# Patient Record
Sex: MALE | Race: BLACK OR AFRICAN AMERICAN | NOT HISPANIC OR LATINO | Employment: FULL TIME | URBAN - METROPOLITAN AREA
[De-identification: names, ages, dates, MRNs, and addresses within clinical notes are randomized per-mention and may not be internally consistent; named-entity substitution may affect disease eponyms.]

---

## 2017-08-04 ENCOUNTER — HOSPITAL ENCOUNTER (INPATIENT)
Facility: HOSPITAL | Age: 62
LOS: 1 days | Discharge: HOME | DRG: 064 | End: 2017-08-05
Attending: EMERGENCY MEDICINE | Admitting: PSYCHIATRY & NEUROLOGY
Payer: COMMERCIAL

## 2017-08-04 DIAGNOSIS — I63.9 STROKE: ICD-10-CM

## 2017-08-04 DIAGNOSIS — I63.411 EMBOLIC STROKE INVOLVING RIGHT MIDDLE CEREBRAL ARTERY: ICD-10-CM

## 2017-08-04 DIAGNOSIS — I63.9 ACUTE ISCHEMIC STROKE: Primary | ICD-10-CM

## 2017-08-04 DIAGNOSIS — R47.01 APHASIA: ICD-10-CM

## 2017-08-04 DIAGNOSIS — R47.01 EXPRESSIVE APHASIA: ICD-10-CM

## 2017-08-04 PROBLEM — D18.00 CAVERNOMA: Status: ACTIVE | Noted: 2017-08-04

## 2017-08-04 PROBLEM — R00.0 RAPID HEART BEAT: Status: ACTIVE | Noted: 2017-08-04

## 2017-08-04 LAB
ABO + RH BLD: NORMAL
ALBUMIN SERPL BCP-MCNC: 4 G/DL
ALP SERPL-CCNC: 71 U/L
ALT SERPL W/O P-5'-P-CCNC: 18 U/L
ANION GAP SERPL CALC-SCNC: 10 MMOL/L
AST SERPL-CCNC: 22 U/L
BASOPHILS # BLD AUTO: 0.03 K/UL
BASOPHILS NFR BLD: 0.7 %
BILIRUB SERPL-MCNC: 0.7 MG/DL
BILIRUB UR QL STRIP: NEGATIVE
BLD GP AB SCN CELLS X3 SERPL QL: NORMAL
BUN SERPL-MCNC: 13 MG/DL
CALCIUM SERPL-MCNC: 9.6 MG/DL
CHLORIDE SERPL-SCNC: 105 MMOL/L
CHOLEST/HDLC SERPL: 4 {RATIO}
CLARITY UR REFRACT.AUTO: CLEAR
CO2 SERPL-SCNC: 24 MMOL/L
COLOR UR AUTO: YELLOW
CREAT SERPL-MCNC: 1.3 MG/DL
DIFFERENTIAL METHOD: ABNORMAL
EOSINOPHIL # BLD AUTO: 0.2 K/UL
EOSINOPHIL NFR BLD: 5 %
ERYTHROCYTE [DISTWIDTH] IN BLOOD BY AUTOMATED COUNT: 13.5 %
EST. GFR  (AFRICAN AMERICAN): >60 ML/MIN/1.73 M^2
EST. GFR  (NON AFRICAN AMERICAN): 57.7 ML/MIN/1.73 M^2
GLUCOSE SERPL-MCNC: 96 MG/DL
GLUCOSE UR QL STRIP: NEGATIVE
HCT VFR BLD AUTO: 41 %
HDL/CHOLESTEROL RATIO: 24.8 %
HDLC SERPL-MCNC: 202 MG/DL
HDLC SERPL-MCNC: 50 MG/DL
HGB BLD-MCNC: 14.9 G/DL
HGB UR QL STRIP: NEGATIVE
INR PPP: 0.9
KETONES UR QL STRIP: NEGATIVE
LDLC SERPL CALC-MCNC: 89 MG/DL
LEUKOCYTE ESTERASE UR QL STRIP: NEGATIVE
LYMPHOCYTES # BLD AUTO: 2.1 K/UL
LYMPHOCYTES NFR BLD: 45.4 %
MCH RBC QN AUTO: 28.5 PG
MCHC RBC AUTO-ENTMCNC: 36.3 G/DL
MCV RBC AUTO: 79 FL
MONOCYTES # BLD AUTO: 0.4 K/UL
MONOCYTES NFR BLD: 7.9 %
NEUTROPHILS # BLD AUTO: 1.9 K/UL
NEUTROPHILS NFR BLD: 40.8 %
NITRITE UR QL STRIP: NEGATIVE
NONHDLC SERPL-MCNC: 152 MG/DL
PH UR STRIP: 5 [PH] (ref 5–8)
PLATELET # BLD AUTO: 190 K/UL
PMV BLD AUTO: 11.5 FL
POCT GLUCOSE: 101 MG/DL (ref 70–110)
POTASSIUM SERPL-SCNC: 3.9 MMOL/L
PROT SERPL-MCNC: 7.5 G/DL
PROT UR QL STRIP: NEGATIVE
PROTHROMBIN TIME: 10.1 SEC
RBC # BLD AUTO: 5.22 M/UL
SODIUM SERPL-SCNC: 139 MMOL/L
SP GR UR STRIP: 1.02 (ref 1–1.03)
TRIGL SERPL-MCNC: 315 MG/DL
TSH SERPL DL<=0.005 MIU/L-ACNC: 2.28 UIU/ML
URN SPEC COLLECT METH UR: NORMAL
UROBILINOGEN UR STRIP-ACNC: NEGATIVE EU/DL
WBC # BLD AUTO: 4.58 K/UL

## 2017-08-04 PROCEDURE — 80061 LIPID PANEL: CPT

## 2017-08-04 PROCEDURE — 25500020 PHARM REV CODE 255: Performed by: EMERGENCY MEDICINE

## 2017-08-04 PROCEDURE — 93010 ELECTROCARDIOGRAM REPORT: CPT | Mod: ,,, | Performed by: INTERNAL MEDICINE

## 2017-08-04 PROCEDURE — 86850 RBC ANTIBODY SCREEN: CPT

## 2017-08-04 PROCEDURE — G0378 HOSPITAL OBSERVATION PER HR: HCPCS

## 2017-08-04 PROCEDURE — 80053 COMPREHEN METABOLIC PANEL: CPT

## 2017-08-04 PROCEDURE — 81003 URINALYSIS AUTO W/O SCOPE: CPT

## 2017-08-04 PROCEDURE — 86900 BLOOD TYPING SEROLOGIC ABO: CPT

## 2017-08-04 PROCEDURE — 84443 ASSAY THYROID STIM HORMONE: CPT

## 2017-08-04 PROCEDURE — 99291 CRITICAL CARE FIRST HOUR: CPT | Mod: 25

## 2017-08-04 PROCEDURE — 85025 COMPLETE CBC W/AUTO DIFF WBC: CPT

## 2017-08-04 PROCEDURE — 85610 PROTHROMBIN TIME: CPT

## 2017-08-04 PROCEDURE — 82962 GLUCOSE BLOOD TEST: CPT

## 2017-08-04 PROCEDURE — 25000003 PHARM REV CODE 250: Performed by: STUDENT IN AN ORGANIZED HEALTH CARE EDUCATION/TRAINING PROGRAM

## 2017-08-04 PROCEDURE — 99223 1ST HOSP IP/OBS HIGH 75: CPT | Mod: ,,, | Performed by: PSYCHIATRY & NEUROLOGY

## 2017-08-04 PROCEDURE — 99291 CRITICAL CARE FIRST HOUR: CPT | Mod: ,,, | Performed by: EMERGENCY MEDICINE

## 2017-08-04 PROCEDURE — 93005 ELECTROCARDIOGRAM TRACING: CPT

## 2017-08-04 RX ORDER — ASPIRIN 325 MG
325 TABLET, DELAYED RELEASE (ENTERIC COATED) ORAL DAILY
Status: DISCONTINUED | OUTPATIENT
Start: 2017-08-05 | End: 2017-08-05 | Stop reason: HOSPADM

## 2017-08-04 RX ORDER — ATORVASTATIN CALCIUM 20 MG/1
40 TABLET, FILM COATED ORAL
Status: COMPLETED | OUTPATIENT
Start: 2017-08-04 | End: 2017-08-04

## 2017-08-04 RX ORDER — ATORVASTATIN CALCIUM 20 MG/1
40 TABLET, FILM COATED ORAL DAILY
Status: DISCONTINUED | OUTPATIENT
Start: 2017-08-05 | End: 2017-08-05 | Stop reason: HOSPADM

## 2017-08-04 RX ORDER — SODIUM CHLORIDE 0.9 % (FLUSH) 0.9 %
3 SYRINGE (ML) INJECTION EVERY 8 HOURS
Status: DISCONTINUED | OUTPATIENT
Start: 2017-08-04 | End: 2017-08-05 | Stop reason: HOSPADM

## 2017-08-04 RX ORDER — HEPARIN SODIUM 5000 [USP'U]/ML
5000 INJECTION, SOLUTION INTRAVENOUS; SUBCUTANEOUS EVERY 8 HOURS
Status: DISCONTINUED | OUTPATIENT
Start: 2017-08-04 | End: 2017-08-05 | Stop reason: HOSPADM

## 2017-08-04 RX ORDER — NAPROXEN 500 MG/1
500 TABLET ORAL 2 TIMES DAILY PRN
Status: ON HOLD | COMMUNITY
End: 2017-08-05 | Stop reason: HOSPADM

## 2017-08-04 RX ORDER — ATENOLOL 50 MG/1
50 TABLET ORAL DAILY
Status: DISCONTINUED | OUTPATIENT
Start: 2017-08-05 | End: 2017-08-05 | Stop reason: HOSPADM

## 2017-08-04 RX ORDER — ATENOLOL 50 MG/1
50 TABLET ORAL DAILY
COMMUNITY
End: 2019-08-14 | Stop reason: SDUPTHER

## 2017-08-04 RX ORDER — LABETALOL HYDROCHLORIDE 5 MG/ML
10 INJECTION, SOLUTION INTRAVENOUS
Status: DISCONTINUED | OUTPATIENT
Start: 2017-08-04 | End: 2017-08-05 | Stop reason: HOSPADM

## 2017-08-04 RX ORDER — ASPIRIN 81 MG/1
81 TABLET ORAL DAILY
Status: ON HOLD | COMMUNITY
End: 2017-08-05 | Stop reason: HOSPADM

## 2017-08-04 RX ADMIN — IOHEXOL 100 ML: 350 INJECTION, SOLUTION INTRAVENOUS at 09:08

## 2017-08-04 RX ADMIN — ATORVASTATIN CALCIUM 40 MG: 20 TABLET, FILM COATED ORAL at 08:08

## 2017-08-04 NOTE — ED TRIAGE NOTES
"Patient states that he was "joking with a coworker when he was suddenly jumbling his words." He felt numbness and tingling during the episode, but denies any current numbness/tingling. States that the episode lasted approx. 45 seconds.   "

## 2017-08-05 VITALS
DIASTOLIC BLOOD PRESSURE: 92 MMHG | HEART RATE: 59 BPM | BODY MASS INDEX: 29.59 KG/M2 | SYSTOLIC BLOOD PRESSURE: 141 MMHG | RESPIRATION RATE: 20 BRPM | TEMPERATURE: 98 F | WEIGHT: 238 LBS | OXYGEN SATURATION: 96 % | HEIGHT: 75 IN

## 2017-08-05 DIAGNOSIS — I63.311 STROKE DUE TO THROMBOSIS OF RIGHT MIDDLE CEREBRAL ARTERY: Primary | ICD-10-CM

## 2017-08-05 PROBLEM — G93.6 CYTOTOXIC CEREBRAL EDEMA: Status: ACTIVE | Noted: 2017-08-05

## 2017-08-05 PROBLEM — I77.3 FIBROMUSCULAR DYSPLASIA: Status: ACTIVE | Noted: 2017-08-05

## 2017-08-05 PROBLEM — I63.411 EMBOLIC STROKE INVOLVING RIGHT MIDDLE CEREBRAL ARTERY: Status: ACTIVE | Noted: 2017-08-04

## 2017-08-05 LAB
ALBUMIN SERPL BCP-MCNC: 3.4 G/DL
ALP SERPL-CCNC: 52 U/L
ALT SERPL W/O P-5'-P-CCNC: 15 U/L
ANION GAP SERPL CALC-SCNC: 7 MMOL/L
APTT BLDCRRT: 23.9 SEC
AST SERPL-CCNC: 17 U/L
BASOPHILS # BLD AUTO: 0.03 K/UL
BASOPHILS NFR BLD: 0.7 %
BILIRUB SERPL-MCNC: 0.7 MG/DL
BUN SERPL-MCNC: 12 MG/DL
CALCIUM SERPL-MCNC: 8.9 MG/DL
CHLORIDE SERPL-SCNC: 105 MMOL/L
CK MB SERPL-MCNC: 1.8 NG/ML
CK MB SERPL-RTO: 0.7 %
CK SERPL-CCNC: 268 U/L
CO2 SERPL-SCNC: 25 MMOL/L
CREAT SERPL-MCNC: 1.2 MG/DL
DIASTOLIC DYSFUNCTION: YES
DIFFERENTIAL METHOD: ABNORMAL
EOSINOPHIL # BLD AUTO: 0.2 K/UL
EOSINOPHIL NFR BLD: 5 %
ERYTHROCYTE [DISTWIDTH] IN BLOOD BY AUTOMATED COUNT: 13.3 %
EST. GFR  (AFRICAN AMERICAN): >60 ML/MIN/1.73 M^2
EST. GFR  (NON AFRICAN AMERICAN): >60 ML/MIN/1.73 M^2
ESTIMATED PA SYSTOLIC PRESSURE: 35.72
GLUCOSE SERPL-MCNC: 84 MG/DL
HCT VFR BLD AUTO: 38.2 %
HGB BLD-MCNC: 14 G/DL
INR PPP: 0.9
LYMPHOCYTES # BLD AUTO: 2 K/UL
LYMPHOCYTES NFR BLD: 42.4 %
MAGNESIUM SERPL-MCNC: 1.9 MG/DL
MCH RBC QN AUTO: 28.6 PG
MCHC RBC AUTO-ENTMCNC: 36.6 G/DL
MCV RBC AUTO: 78 FL
MITRAL VALVE MOBILITY: NORMAL
MITRAL VALVE REGURGITATION: ABNORMAL
MONOCYTES # BLD AUTO: 0.4 K/UL
MONOCYTES NFR BLD: 7.8 %
NEUTROPHILS # BLD AUTO: 2 K/UL
NEUTROPHILS NFR BLD: 44.1 %
PHOSPHATE SERPL-MCNC: 3.8 MG/DL
PLATELET # BLD AUTO: 162 K/UL
PMV BLD AUTO: 11.9 FL
POTASSIUM SERPL-SCNC: 3.7 MMOL/L
PROT SERPL-MCNC: 6.2 G/DL
PROTHROMBIN TIME: 10.2 SEC
RBC # BLD AUTO: 4.9 M/UL
RETIRED EF AND QEF - SEE NOTES: 60 (ref 55–65)
SODIUM SERPL-SCNC: 137 MMOL/L
TROPONIN I SERPL DL<=0.01 NG/ML-MCNC: 0.01 NG/ML
WBC # BLD AUTO: 4.6 K/UL

## 2017-08-05 PROCEDURE — 99239 HOSP IP/OBS DSCHRG MGMT >30: CPT | Mod: ,,, | Performed by: PSYCHIATRY & NEUROLOGY

## 2017-08-05 PROCEDURE — 63600175 PHARM REV CODE 636 W HCPCS: Performed by: PHYSICIAN ASSISTANT

## 2017-08-05 PROCEDURE — 85025 COMPLETE CBC W/AUTO DIFF WBC: CPT

## 2017-08-05 PROCEDURE — 92610 EVALUATE SWALLOWING FUNCTION: CPT

## 2017-08-05 PROCEDURE — 97165 OT EVAL LOW COMPLEX 30 MIN: CPT

## 2017-08-05 PROCEDURE — 85610 PROTHROMBIN TIME: CPT

## 2017-08-05 PROCEDURE — 85730 THROMBOPLASTIN TIME PARTIAL: CPT

## 2017-08-05 PROCEDURE — 97530 THERAPEUTIC ACTIVITIES: CPT

## 2017-08-05 PROCEDURE — 80053 COMPREHEN METABOLIC PANEL: CPT

## 2017-08-05 PROCEDURE — 82553 CREATINE MB FRACTION: CPT

## 2017-08-05 PROCEDURE — A4216 STERILE WATER/SALINE, 10 ML: HCPCS | Performed by: PHYSICIAN ASSISTANT

## 2017-08-05 PROCEDURE — 25000003 PHARM REV CODE 250: Performed by: STUDENT IN AN ORGANIZED HEALTH CARE EDUCATION/TRAINING PROGRAM

## 2017-08-05 PROCEDURE — 36415 COLL VENOUS BLD VENIPUNCTURE: CPT

## 2017-08-05 PROCEDURE — G8989 SELF CARE D/C STATUS: HCPCS | Mod: CH

## 2017-08-05 PROCEDURE — G8988 SELF CARE GOAL STATUS: HCPCS | Mod: CH

## 2017-08-05 PROCEDURE — 83735 ASSAY OF MAGNESIUM: CPT

## 2017-08-05 PROCEDURE — 93306 TTE W/DOPPLER COMPLETE: CPT

## 2017-08-05 PROCEDURE — 84100 ASSAY OF PHOSPHORUS: CPT

## 2017-08-05 PROCEDURE — G8996 SWALLOW CURRENT STATUS: HCPCS | Mod: CH

## 2017-08-05 PROCEDURE — 99222 1ST HOSP IP/OBS MODERATE 55: CPT | Mod: ,,, | Performed by: SURGERY

## 2017-08-05 PROCEDURE — 20600001 HC STEP DOWN PRIVATE ROOM

## 2017-08-05 PROCEDURE — 93306 TTE W/DOPPLER COMPLETE: CPT | Mod: 26,,, | Performed by: INTERNAL MEDICINE

## 2017-08-05 PROCEDURE — G8998 SWALLOW D/C STATUS: HCPCS | Mod: CH

## 2017-08-05 PROCEDURE — G8987 SELF CARE CURRENT STATUS: HCPCS | Mod: CH

## 2017-08-05 PROCEDURE — 25000003 PHARM REV CODE 250: Performed by: PHYSICIAN ASSISTANT

## 2017-08-05 PROCEDURE — 84484 ASSAY OF TROPONIN QUANT: CPT

## 2017-08-05 PROCEDURE — G8997 SWALLOW GOAL STATUS: HCPCS | Mod: CH

## 2017-08-05 RX ORDER — CLOPIDOGREL BISULFATE 75 MG/1
75 TABLET ORAL DAILY
Status: DISCONTINUED | OUTPATIENT
Start: 2017-08-05 | End: 2017-08-05 | Stop reason: HOSPADM

## 2017-08-05 RX ORDER — CLOPIDOGREL BISULFATE 75 MG/1
75 TABLET ORAL DAILY
Qty: 30 TABLET | Refills: 1 | Status: SHIPPED | OUTPATIENT
Start: 2017-08-05 | End: 2017-09-26

## 2017-08-05 RX ORDER — ROSUVASTATIN CALCIUM 40 MG/1
40 TABLET, COATED ORAL NIGHTLY
Qty: 30 TABLET | Refills: 1 | Status: SHIPPED | OUTPATIENT
Start: 2017-08-05 | End: 2017-11-29 | Stop reason: SDUPTHER

## 2017-08-05 RX ORDER — ASPIRIN 325 MG
325 TABLET, DELAYED RELEASE (ENTERIC COATED) ORAL DAILY
Qty: 30 TABLET | Refills: 1 | Status: SHIPPED | OUTPATIENT
Start: 2017-08-05 | End: 2017-09-26

## 2017-08-05 RX ADMIN — HEPARIN SODIUM 5000 UNITS: 5000 INJECTION, SOLUTION INTRAVENOUS; SUBCUTANEOUS at 01:08

## 2017-08-05 RX ADMIN — HEPARIN SODIUM 5000 UNITS: 5000 INJECTION, SOLUTION INTRAVENOUS; SUBCUTANEOUS at 06:08

## 2017-08-05 RX ADMIN — CLOPIDOGREL 75 MG: 75 TABLET, FILM COATED ORAL at 01:08

## 2017-08-05 RX ADMIN — ATORVASTATIN CALCIUM 40 MG: 20 TABLET, FILM COATED ORAL at 09:08

## 2017-08-05 RX ADMIN — SODIUM CHLORIDE, PRESERVATIVE FREE 3 ML: 5 INJECTION INTRAVENOUS at 01:08

## 2017-08-05 RX ADMIN — ASPIRIN 325 MG: 325 TABLET, DELAYED RELEASE ORAL at 09:08

## 2017-08-05 RX ADMIN — ATENOLOL 50 MG: 50 TABLET ORAL at 09:08

## 2017-08-05 RX ADMIN — SODIUM CHLORIDE, PRESERVATIVE FREE 3 ML: 5 INJECTION INTRAVENOUS at 06:08

## 2017-08-05 NOTE — PT/OT/SLP PROGRESS
Physical Therapy  PT Screen    Reji Aleman  MRN: [id number]     PT Evaluate and treat orders received and acknowledged. PT alerted by OT that pt present with resolution of symptoms and no therapy needs at this time. PT arrived to pt's room to discuss with pt; pt confirmed OT's report and stated no needs for PT at this time. No charges entered for this services. D/C Acute PT services 8/5/2017.    Lisa Limon, PT, DPT  [phone]  5/15/2017

## 2017-08-05 NOTE — PLAN OF CARE
Problem: Patient Care Overview  Goal: Plan of Care Review  Poc reviewed with pt. Upon d/c today Including s/s of stroke, when to call emergency services, medications ordered and low fat/carb/sodium diet to help prevent a future stroke, and management of hypertension. Pt. Verbalized understanding.  Pt. Without falls, injuries, or skin breakdown this shift.

## 2017-08-05 NOTE — HPI
62 year old left handed male presents to the ED after what he describes as trouble getting his words out x 30 seconds today while at work. The patient also reports bilateral fingertip tingling at the time. The patient reports no prior history of similar, and states that he did not have any facial droop, headache, walking problems, weakness, or coordination problems during this event. The patient takes home daily baby asa and atenolol for rapid heart beat (dx in 1996 - was not told afib). Denies recent palpitations or irregular heart beat.

## 2017-08-05 NOTE — PLAN OF CARE
Problem: SLP Goal  Goal: SLP Goal  Speech Language Pathology Goals  Goals expected to be met by 8/12  1. Pt will participate in speech language cognitive eval to determine need for intervention.       Swallow evaluation completed with initiated POC.    Janina Arias M.A. CCC-SLP  Speech Language Pathologist  [phone]  8/5/2017

## 2017-08-05 NOTE — NURSING TRANSFER
Nursing Transfer Note      8/4/2017     Transfer from ED to Room 742    Transfer via stretcher    Transfer with personal belongings    Transported by patient escort    Medicines sent: no    Chart send with patient: yes    Notified: stroke team     Patient reassessed at: upon arrival    Upon arrival to floor: oriented to room, vital signs taken, assessment per flowsheet

## 2017-08-05 NOTE — ASSESSMENT & PLAN NOTE
Patient is a 63 yo M with h/o sinus tachycardia (on atenolol), HTN presenting with resolved episode of expressive aphasia found to have right frontal stroke.    Will f/u ECHO results; if other cause for R frontal stroke will defer treatment of carotid web; however if R carotid web cause of stroke will consider right carotid endarterectomy  Continue medical management of stroke: ASA, plavix,statin  Will arrange for follow up with Dr. Gamez as an outpatient if discharging today

## 2017-08-05 NOTE — PLAN OF CARE
Problem: Occupational Therapy Goal  Goal: Occupational Therapy Goal  Goals not set 2* no further OT needed.  OT evaluation completed.  ANALISA Taylor  8/5/2017

## 2017-08-05 NOTE — PLAN OF CARE
Problem: Patient Care Overview  Goal: Individualization & Mutuality  Admit dx:  Acute ischemic stroke    PMH:   Rapid heart beat   Cavernoma   Aphasia   Expressive aphasia         Patient dislikes the room cold; prefers his own clothes vs. hospital gown    Outcome: Ongoing (interventions implemented as appropriate)  POC reviewed with patient. Vital signs stable. No falls or injuries during this shift. No deficits observed from stroke. NIH scale 0. Advanced diet since passed CLYDE. Skin remains intact at this time. No s/s of infection noted.

## 2017-08-05 NOTE — HPI
Patient is a 63 yo M with h/o sinus tachycardia (on atenolol), HTN presenting with resolved episode of expressive aphasia.  Patient reports that while at work had 30 sec-1 min episode of inability to get words out that resolved.  MRI done demonstrated small right frontal lobe infarct with associated hemorrhage.  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  Never been a smoker; was on ASA daily prior to this.

## 2017-08-05 NOTE — CONSULTS
[facility]  Vascular Surgery  Consult Note    Consults  Subjective:     Chief Complaint/Reason for Admission: stroke    History of Present Illness: Patient is a 61 yo M with h/o sinus tachycardia (on atenolol), HTN presenting with resolved episode of expressive aphasia.  Patient reports that while at work had 30 sec-1 min episode of inability to get words out that resolved.  MRI done demonstrated small right frontal lobe infarct with associated hemorrhage.  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  Never been a smoker; was on ASA daily prior to this.    Prescriptions Prior to Admission   Medication Sig Dispense Refill Last Dose    aspirin (ECOTRIN) 81 MG EC tablet Take 81 mg by mouth once daily.   8/4/2017    atenolol (TENORMIN) 50 MG tablet Take 50 mg by mouth once daily.   8/4/2017    naproxen (EC NAPROSYN) 500 MG EC tablet Take 500 mg by mouth 2 (two) times daily as needed.   More than a month at Unknown time       Review of patient's allergies indicates:   Allergen Reactions    Sulfa (sulfonamide antibiotics)        Past Medical History:   Diagnosis Date    Hypertension      History reviewed. No pertinent surgical history.  Family History     None        Social History Main Topics    Smoking status: Never Smoker    Smokeless tobacco: Never Used    Alcohol use No    Drug use: No    Sexual activity: Not on file     Review of Systems   Constitutional: Negative for activity change, chills and fatigue.   HENT: Negative for congestion and dental problem.    Eyes: Negative for photophobia, redness and itching.   Respiratory: Negative for apnea, choking and chest tightness.    Cardiovascular: Negative for chest pain and leg swelling.   Gastrointestinal: Negative for abdominal distention and abdominal pain.   Endocrine: Negative for cold intolerance and heat intolerance.   Genitourinary: Negative for difficulty  urinating and dysuria.   Musculoskeletal: Negative for arthralgias and back pain.   Skin: Negative for color change and pallor.   Allergic/Immunologic: Negative for environmental allergies and food allergies.   Neurological: Negative for dizziness and facial asymmetry.   Hematological: Negative for adenopathy.   Psychiatric/Behavioral: Negative for agitation, behavioral problems and confusion.     Objective:     Vital Signs (Most Recent):  Temp: 97.7 °F (36.5 °C) (08/05/17 1200)  Pulse: 64 (08/05/17 1200)  Resp: 20 (08/05/17 1200)  BP: (!) 141/92 (08/05/17 1200)  SpO2: 96 % (08/05/17 1200) Vital Signs (24h Range):  Temp:  [97.5 °F (36.4 °C)-98 °F (36.7 °C)] 97.7 °F (36.5 °C)  Pulse:  [58-84] 64  Resp:  [15-20] 20  SpO2:  [92 %-100 %] 96 %  BP: (138-173)/() 141/92     Weight: 108 kg (238 lb)  Body mass index is 29.75 kg/m².    Physical Exam   Constitutional: He is oriented to person, place, and time. He appears well-developed and well-nourished.   HENT:   Head: Normocephalic and atraumatic.   Eyes: EOM are normal. Pupils are equal, round, and reactive to light.   Cardiovascular: Normal rate and regular rhythm.    Pulses:       Radial pulses are 2+ on the right side, and 2+ on the left side.        Posterior tibial pulses are 1+ on the right side, and 1+ on the left side.   Pulmonary/Chest: Effort normal. No respiratory distress.   Abdominal: Soft. He exhibits no distension. There is no tenderness.   Musculoskeletal: Normal range of motion. He exhibits no edema.   Neurological: He is oriented to person, place, and time.   5/5 strength bilateral upper and lower extremities   Skin: Skin is warm and dry.   Psychiatric: He has a normal mood and affect.       Significant Labs:  CBC:   Recent Labs  Lab 08/05/17  0442   WBC 4.60   RBC 4.90   HGB 14.0   HCT 38.2*      MCV 78*   MCH 28.6   MCHC 36.6*     CMP:   Recent Labs  Lab 08/05/17  0441   GLU 84   CALCIUM 8.9   ALBUMIN 3.4*   PROT 6.2      K 3.7   CO2  25      BUN 12   CREATININE 1.2   ALKPHOS 52*   ALT 15   AST 17   BILITOT 0.7     Coagulation:   Recent Labs  Lab 08/05/17  0441   LABPROT 10.2   INR 0.9   APTT 23.9       Significant Diagnostics:  MRI brain without contrast    08/04/17 19:58:21    Accession# [id number]    CLINICAL INDICATION: 62 year old M with aphasia.      TECHNIQUE: Multiplanar multisequence MR imaging of the brain was performed without the use of intravenous contrast.    COMPARISON: CT head 08/04/2017.    FINDINGS:    The ventricles are normal in size for age, without evidence of hydrocephalus.    Small focus of cortical diffusion restriction in the right frontal lobe with associated gradient susceptibility.  There is a 1.1 cm lesion with heterogeneous signal in the left middle cerebellar peduncle suggestive of a cavernous malformation.    No extra-axial blood or fluid collections.    The T2 skull base flow voids are preserved. Bone marrow signal intensity is unremarkable.  Mild mucosal thickening of bilateral maxillary antra.   Impression         1.  Small focus of acute cortical infarct in the right frontal lobe with associated microhemorrhage.    2.  1.1 cm cavernous malformation in the left middle cerebral peduncle.  ______________________________________         Assessment/Plan:     Acute ischemic stroke    Patient is a 61 yo M with h/o sinus tachycardia (on atenolol), HTN presenting with resolved episode of expressive aphasia found to have right frontal stroke.    Will f/u ECHO results; if other cause for R frontal stroke will defer treatment of carotid web; however if R carotid web cause of stroke will consider right carotid endarterectomy  Continue medical management of stroke: ASA, plavix,statin  Will arrange for follow up with Dr. Gamez as an outpatient if discharging today            Thank you for your consult.    Natalie Lofton MD  Vascular Surgery  [facility]

## 2017-08-05 NOTE — H&P
[facility]  Vascular Neurology  Comprehensive Stroke Center  History & Physical    Consults  Assessment/Plan:     Patient is a 62 y.o. year old male with:    Acute ischemic stroke    Right frontal area with left cerebellar peduncle cavernoma.     Symptoms correlate as patient is a left handed individual with right hemispheric ischemic stroke and episode of expressive aphasia     Increase ASA to 325 daily  Atorvastatin 40 daily  - LDL > 70   CTA head and neck   Echo   Hep vte  Bp< 220/110  - permissive HTN  PT/OT/ speech - likely no needs as he is back to baseline   Stroke education        Cavernoma    Can follow outpatient with neurosurg - asymptomatic         Rapid heart beat    No known dx of afib  Controlled- takes atenolol              Thrombolysis Candidate? No  1. Contraindications: symptom resolution       Interventional Revascularization Candidate?  No; No large vessel occlusion    Research Candidate? No     Subjective:     History of Present Illness:  62 year old left handed male presents to the ED after what he describes as trouble getting his words out x 30 seconds today while at work. The patient also reports bilateral fingertip tingling at the time. The patient reports no prior history of similar, and states that he did not have any facial droop, headache, walking problems, weakness, or coordination problems during this event. The patient takes home daily baby asa and atenolol for rapid heart beat (dx in 1996 - was not told afib). Denies recent palpitations or irregular heart beat.            Past Medical History:   Diagnosis Date    Hypertension      History reviewed. No pertinent surgical history.  History reviewed. No pertinent family history.  Social History   Substance Use Topics    Smoking status: Never Smoker    Smokeless tobacco: Never Used    Alcohol use No     Review of patient's allergies indicates:   Allergen Reactions    Sulfa (sulfonamide antibiotics)       Medications: I have reviewed the current medication administration record.      (Not in a hospital admission)    Review of Systems   Constitutional: Negative for chills and fever.   HENT: Negative for drooling and facial swelling.    Eyes: Negative for visual disturbance.   Respiratory: Negative for shortness of breath.    Cardiovascular: Negative for palpitations.   Gastrointestinal: Negative for vomiting.   Skin: Negative for color change.   Allergic/Immunologic: Negative for immunocompromised state.   Neurological: Positive for speech difficulty (resolved) and numbness (bilateral fingertips, resolved). Negative for dizziness, syncope, facial asymmetry, weakness and headaches.   Psychiatric/Behavioral: Negative for agitation.     Objective:     Vital Signs (Most Recent):  Temp: 98 °F (36.7 °C) (08/04/17 1812)  Pulse: 62 (08/04/17 2021)  Resp: 19 (08/04/17 1952)  BP: (!) 153/93 (08/04/17 2021)  SpO2: 100 % (08/04/17 2021)    Vital Signs Range (Last 24H):  Temp:  [98 °F (36.7 °C)]   Pulse:  [62-82]   Resp:  [15-19]   BP: (138-173)/(88-97)   SpO2:  [98 %-100 %]     Physical Exam   Constitutional: He is oriented to person, place, and time. He appears well-developed and well-nourished.   HENT:   Head: Normocephalic and atraumatic.   Eyes: EOM are normal. Pupils are equal, round, and reactive to light.   Cardiovascular: Normal rate.    Pulmonary/Chest: Effort normal.   Abdominal: Soft.   Musculoskeletal: Normal range of motion. He exhibits no edema.   Neurological: He is alert and oriented to person, place, and time.   Skin: Skin is warm and dry.   Psychiatric: He has a normal mood and affect.   Nursing note and vitals reviewed.      Neurological Exam:   LOC: alert and follows requests  Language: No aphasia  Speech: No dysarthria  Orientation: Person, Place, Time  Memory: Recent memory intact, Remote memory intact, Age correct, Month correct  Visual Fields (CN II): Full  EOM (CN III, IV, VI):  Full/intact  Oculocephalics: normal  Pupils (CN III, IV, VI): PERRL  Facial Sensation (CN V): Symmetric  Facial Movement (CN VII): symmetric facial expression  Hearing (CN VIII): intact bilaterally  Gag Reflex (CN IX, X): normal/symmetric  Shoulder/Neck (CN XI): SCM-Left: Normal ; SCM-Right: Normal ; Shoulder Shrug: Normal/Symetric  Tongue (CN XII): to midline  Motor*: Arm Left:  Normal (5/5), Leg Left:   Normal (5/5), Arm Right:   Normal (5/5), Leg Right:   Normal (5/5)  Cerebellar*: Normal limb  Sensation: intact to light touch, temperature and vibration  Tone: Arm-Left: normal; Leg-Left: normal; Arm-Right: normal; Leg-Right: normal    Stroke Scales  NIH Stroke Scale:    Level of Consciousness: 0 - alert  LOC Questions: 0 - answers both correctly  LOC Commands: 0 - performs both correctly  Best Gaze: 0 - normal  Visual: 0 - no visual loss  Facial Palsy: 0 - normal  Motor Left Arm: 0 - no drift  Motor Right Arm: 0 - no drift  Motor Left Le - no drift  Motor Right Le - no drift  Limb Ataxia: 0 - absent  Sensory: 0 - normal  Best Language: 0 - no aphasia  Dysarthria: 0 - normal articulation  Extinction and Inattention: 0 - no neglect  NIH Stroke Scale Total: 0  Modified Ralls Scale:   Timeline: Prior to symptoms onset  Modified Ralls Score: 0 - no symptoms    ABCD2 Scale for TIA:   Age > or = 60: 1 - yes  B/P or = 140/9 at Initial Evaluation: 1 - yes  Clinical Features of TIA: 1 - speech disturbance without weakness  Duration of Symptoms: 0 - < 10 minutes  Diabetes Mellitus in History: 0 - no  ABCD2 Scale Total: 3        Laboratory:  CMP:   Recent Labs  Lab 17  1825   CALCIUM 9.6   ALBUMIN 4.0   PROT 7.5      K 3.9   CO2 24      BUN 13   CREATININE 1.3   ALKPHOS 71   ALT 18   AST 22   BILITOT 0.7     CBC:   Recent Labs  Lab 17  1825   WBC 4.58   RBC 5.22   HGB 14.9   HCT 41.0      MCV 79*   MCH 28.5   MCHC 36.3*     Lipid Panel:   Recent Labs  Lab 17  1825   CHOL 202*    LDLCALC 89.0   HDL 50   TRIG 315*     Coagulation:   Recent Labs  Lab 08/04/17  1825   INR 0.9     Platelet Aggregation Study: No results for input(s): PLTAGG, PLTAGINTERP, PLTAGREGLACO, ADPPLTAGGREG in the last 168 hours.  Hgb A1C: No results for input(s): HGBA1C in the last 168 hours.  TSH:   Recent Labs  Lab 08/04/17  1825   TSH 2.278       Diagnostic Results:  Brain Imaging:   MRI 8/4/17  IMPRESSION:    1.  Small focus of acute cortical infarct in the right frontal lobe with associated hemorrhage.     2.  1.1 cm cavernous malformation in the left middle cerebral peduncle.    CT head 8/4/17  1.0 cm hyperdense focus within the left middle cerebellar peduncle without surrounding edema.  This finding is nonspecific and may represent hemorrhage versus cavernoma, however hemorrhage is thought less likely.  Further evaluation may be obtained with MRI.    No CT evidence of acute cerebrovascular infarction.  MRI followup as needed.        MAHENDRA Henderson  [facility]  Department of Vascular Neurology   [facility]

## 2017-08-05 NOTE — PT/OT/SLP EVAL
Speech Language Pathology  Evaluation    Reji Aleman   MRN: [id number]   Admitting Diagnosis: stroke    Diet recommendations: Solid Diet Level: Regular  Liquid Diet Level: Thin   Standard precautions    SLP Treatment Date: 08/05/17  Speech Start Time: 1007     Speech Stop Time: 1015     Speech Total (min): 8 min       TREATMENT BILLABLE MINUTES:  Eval Swallow and Oral Function 8    Diagnosis: stroke    Past Medical History:   Diagnosis Date    Hypertension      History reviewed. No pertinent surgical history.    Has the patient been evaluated by SLP for swallowing? : Yes  Keep patient NPO?: No   General Precautions: Standard,            Prior diet: No reported restrictions; no SLP notes in Epic.    Subjective:  Pt awake; pleasant  Patient goals: not stated.    Pain/Comfort  Pain Rating 1: 0/10  Pain Rating Post-Intervention 1: 0/10    Objective:        Oral Musculature Evaluation  Oral Musculature: WFL  Dentition: present and adequate  Mucosal Quality: good  Mandibular Strength and Mobility: WNL  Oral Labial Strength and Mobility: WNL  Lingual Strength and Mobility: WNL  Buccal Strength and Mobility: WNL  Volitional Swallow: able to demonstrate  Voice Prior to PO Intake: clear     Bedside Swallow Eval:  Consistencies Assessed: Thin liquids 2 oz via cup and straw, Puree 5 1/2-full spoonfuls and Solids 1/4 javi cracker  Oral Phase: WNL  Pharyngeal Phase: no overt clinical  signs/symptoms of aspiration and no overt clinical signs/symptoms of pharyngeal dysphagia    Pt denied difficulty with breakfast; denies cognitive-linguistic changes sp admit. Skilled education provided on aspiration precautions and diet recommendations. Whiteboard updated with diet recommendations/aspiration precautions.  No further questions.                                       Assessment:  Reji Aleman is a 62 y.o. male with a medical diagnosis of stroke and presents with oropharyngeal swallow WNL.     Do you have any cultural, spiritual,  Catholic conflicts, given your current situation?: no     Discharge recommendations: Discharge Facility/Level Of Care Needs:  (likely no needs)     Goals:    SLP Goals        Problem: SLP Goal    Goal Priority Disciplines Outcome   SLP Goal     SLP    Description:  Speech Language Pathology Goals  Goals expected to be met by 8/12  1. Pt will participate in speech language cognitive eval to determine need for intervention.                        Plan:   Patient to be seen Therapy Frequency: 5 x/week   Plan of Care expires: 09/03/17  Plan of Care reviewed with: patient  SLP Follow-up?: Yes  SLP - Next Visit Date: 08/07/17           Janina Arias M.A. CCC-SLP  Speech Language Pathologist  [phone]  8/5/2017

## 2017-08-05 NOTE — NURSING
Pt. D/c to home with vss, no acute distress noted, IV line d/c with catheter intact, all d/c instructions with f/up appt.'s and medications explained and given to pt. Pt. Verbalized understanding.

## 2017-08-05 NOTE — CONSULTS
Reason for Consult: Stroke Pathway.    Reviewed Stroke nutrition therapy with pt and wife.  Discussed low Na foods, high fiber items and preferred ways to prepare recipes.  Encouraged to follow low Na diet 1500-2000mg sodium per day.  Recommended foods list provided and example menu.  All questions answered, no further needs at this time.

## 2017-08-05 NOTE — ED PROVIDER NOTES
"Encounter Date: 8/4/2017       History     Chief Complaint   Patient presents with    Aphasia     states at approx 430 pm today, episode of "unable to answer questions, speech slurred, couldn't find words" states incident lasted approx 1 min, all symptoms now resolved     Mr. Aleman is a 62M with sinus tachycardia who presents today after an expressive aphasia episode today at work. Pt reports that he was telling his coworkers a story at work, when the words just wouldn't come out. He struggled for 30 sec or so, and "people thought (he) was joking around, (he) could understand them, knew what (he) wanted to say, but couldn't get the words out. (He) then turned to the  and asked if (he) looked like (he) was okay, and the words came out perfectly."  Pt reported paresthesia in fingertips of both hands.     Pt never lost consciousness. Denies HA, Dizziness, Chest Pain, Syncope, LOF of limbs, or Discoordination.           Review of patient's allergies indicates:   Allergen Reactions    Sulfa (sulfonamide antibiotics)      Past Medical History:   Diagnosis Date    Embolic stroke involving right middle cerebral artery 8/4/2017    Hypertension      History reviewed. No pertinent surgical history.  History reviewed. No pertinent family history.  Social History   Substance Use Topics    Smoking status: Never Smoker    Smokeless tobacco: Never Used    Alcohol use No     Review of Systems   Constitutional: Negative for activity change and fever.   Eyes: Negative for visual disturbance.   Respiratory: Negative for choking, chest tightness and shortness of breath.    Cardiovascular: Negative for chest pain, palpitations and leg swelling.   Neurological: Positive for speech difficulty. Negative for dizziness, seizures, syncope, facial asymmetry and headaches.   Psychiatric/Behavioral: Negative for confusion.   All other systems reviewed and are negative.      Physical Exam     Initial Vitals [08/04/17 1812]   BP " Pulse Resp Temp SpO2   (!) 138/91 82 15 98 °F (36.7 °C) 98 %      MAP       106.67         Physical Exam    Nursing note and vitals reviewed.  Constitutional: He appears well-developed and well-nourished. He is not diaphoretic. No distress.   HENT:   Head: Normocephalic and atraumatic.   Eyes: Conjunctivae are normal.   Neck: Neck supple.   Cardiovascular: Normal rate and regular rhythm.   No murmur heard.  Pulmonary/Chest: Breath sounds normal. No stridor. No respiratory distress. He has no wheezes.   Abdominal: He exhibits no distension.   Musculoskeletal: He exhibits no edema or tenderness.   Neurological: He is alert and oriented to person, place, and time. He has normal strength. No cranial nerve deficit or sensory deficit.   Skin: Skin is warm and dry.   Psychiatric: He has a normal mood and affect.         ED Course   Critical Care  Date/Time: 8/4/2017 8:36 PM  Performed by: LEENA WHITE  Authorized by: LEENA WHITE   Total critical care time (exclusive of procedural time) : 35 minutes  Critical care time was exclusive of teaching time and separately billable procedures and treating other patients.  Critical care was necessary to treat or prevent imminent or life-threatening deterioration of the following conditions: CNS failure or compromise.  Critical care was time spent personally by me on the following activities: development of treatment plan with patient or surrogate, discussions with consultants, evaluation of patient's response to treatment, examination of patient, obtaining history from patient or surrogate, ordering and performing treatments and interventions, ordering and review of laboratory studies, ordering and review of radiographic studies, pulse oximetry, re-evaluation of patient's condition and review of old charts.        Labs Reviewed   CBC W/ AUTO DIFFERENTIAL - Abnormal; Notable for the following:        Result Value    MCV 79 (*)     MCHC 36.3 (*)     All other components  within normal limits   COMPREHENSIVE METABOLIC PANEL - Abnormal; Notable for the following:     eGFR if non  57.7 (*)     All other components within normal limits   LIPID PANEL - Abnormal; Notable for the following:     Cholesterol 202 (*)     Triglycerides 315 (*)     All other components within normal limits   PROTIME-INR   TSH   HEMOGLOBIN A1C   TYPE & SCREEN   POCT GLUCOSE             Medical Decision Making:   Initial Assessment:   Expressive Aphasia (2/2 TIA)  Differential Diagnosis:   Stroke  CVA  Trauma  Conversion disorder  ED Management:  Stroke team consulted  Patient has fully revcovered at this point, episode only lasted about 30 seconds,per patient.               Attending Attestation:   Physician Attestation Statement for Resident:  As the supervising MD   Physician Attestation Statement: I have personally seen and examined this patient.   I agree with the above history. -: 63 yo m, h/o HTN, here s/p brief episode aphasia at working, lasting 30 seconds, resolved spontaneously, asx on arrival to ED, no focal weakness.  Neuro exam normal.  CT showed possible cerebellar peduncle cavernous malformation vs hemorrhage so MRI brain done showing small acute R frontal lobe infarct w small area of hemorrhage.  Vascular neuro aware.  Will admit pt    As the supervising MD I agree with the above PE.    As the supervising MD I agree with the above treatment, course, plan, and disposition.  I have reviewed and agree with the residents interpretation of the following: lab data, CT scans, EKG and x-rays.  I have reviewed the following: old records at this facility.                    ED Course     Clinical Impression:   The primary encounter diagnosis was Acute ischemic stroke. Diagnoses of Aphasia, Stroke, Expressive aphasia, and Embolic stroke involving right middle cerebral artery were also pertinent to this visit.                           Ale Farr MD  08/05/17 7350

## 2017-08-05 NOTE — NURSING TRANSFER
Nursing Transfer Note      8/5/2017     Transfer to ultrasound    Transfer via  Stretcher    Transfer with pt. transport

## 2017-08-05 NOTE — SUBJECTIVE & OBJECTIVE
Prescriptions Prior to Admission   Medication Sig Dispense Refill Last Dose    aspirin (ECOTRIN) 81 MG EC tablet Take 81 mg by mouth once daily.   8/4/2017    atenolol (TENORMIN) 50 MG tablet Take 50 mg by mouth once daily.   8/4/2017    naproxen (EC NAPROSYN) 500 MG EC tablet Take 500 mg by mouth 2 (two) times daily as needed.   More than a month at Unknown time       Review of patient's allergies indicates:   Allergen Reactions    Sulfa (sulfonamide antibiotics)        Past Medical History:   Diagnosis Date    Hypertension      History reviewed. No pertinent surgical history.  Family History     None        Social History Main Topics    Smoking status: Never Smoker    Smokeless tobacco: Never Used    Alcohol use No    Drug use: No    Sexual activity: Not on file     Review of Systems   Constitutional: Negative for activity change, chills and fatigue.   HENT: Negative for congestion and dental problem.    Eyes: Negative for photophobia, redness and itching.   Respiratory: Negative for apnea, choking and chest tightness.    Cardiovascular: Negative for chest pain and leg swelling.   Gastrointestinal: Negative for abdominal distention and abdominal pain.   Endocrine: Negative for cold intolerance and heat intolerance.   Genitourinary: Negative for difficulty urinating and dysuria.   Musculoskeletal: Negative for arthralgias and back pain.   Skin: Negative for color change and pallor.   Allergic/Immunologic: Negative for environmental allergies and food allergies.   Neurological: Negative for dizziness and facial asymmetry.   Hematological: Negative for adenopathy.   Psychiatric/Behavioral: Negative for agitation, behavioral problems and confusion.     Objective:     Vital Signs (Most Recent):  Temp: 97.7 °F (36.5 °C) (08/05/17 1200)  Pulse: 64 (08/05/17 1200)  Resp: 20 (08/05/17 1200)  BP: (!) 141/92 (08/05/17 1200)  SpO2: 96 % (08/05/17 1200) Vital Signs (24h Range):  Temp:  [97.5 °F (36.4 °C)-98 °F (36.7  °C)] 97.7 °F (36.5 °C)  Pulse:  [58-84] 64  Resp:  [15-20] 20  SpO2:  [92 %-100 %] 96 %  BP: (138-173)/() 141/92     Weight: 108 kg (238 lb)  Body mass index is 29.75 kg/m².    Physical Exam   Constitutional: He is oriented to person, place, and time. He appears well-developed and well-nourished.   HENT:   Head: Normocephalic and atraumatic.   Eyes: EOM are normal. Pupils are equal, round, and reactive to light.   Cardiovascular: Normal rate and regular rhythm.    Pulses:       Radial pulses are 2+ on the right side, and 2+ on the left side.        Posterior tibial pulses are 1+ on the right side, and 1+ on the left side.   Pulmonary/Chest: Effort normal. No respiratory distress.   Abdominal: Soft. He exhibits no distension. There is no tenderness.   Musculoskeletal: Normal range of motion. He exhibits no edema.   Neurological: He is oriented to person, place, and time.   5/5 strength bilateral upper and lower extremities   Skin: Skin is warm and dry.   Psychiatric: He has a normal mood and affect.       Significant Labs:  CBC:   Recent Labs  Lab 08/05/17 0442   WBC 4.60   RBC 4.90   HGB 14.0   HCT 38.2*      MCV 78*   MCH 28.6   MCHC 36.6*     CMP:   Recent Labs  Lab 08/05/17 0441   GLU 84   CALCIUM 8.9   ALBUMIN 3.4*   PROT 6.2      K 3.7   CO2 25      BUN 12   CREATININE 1.2   ALKPHOS 52*   ALT 15   AST 17   BILITOT 0.7     Coagulation:   Recent Labs  Lab 08/05/17 0441   LABPROT 10.2   INR 0.9   APTT 23.9       Significant Diagnostics:  MRI brain without contrast    08/04/17 19:58:21    Accession# [id number]    CLINICAL INDICATION: 62 year old M with aphasia.      TECHNIQUE: Multiplanar multisequence MR imaging of the brain was performed without the use of intravenous contrast.    COMPARISON: CT head 08/04/2017.    FINDINGS:    The ventricles are normal in size for age, without evidence of hydrocephalus.    Small focus of cortical diffusion restriction in the right frontal lobe with  associated gradient susceptibility.  There is a 1.1 cm lesion with heterogeneous signal in the left middle cerebellar peduncle suggestive of a cavernous malformation.    No extra-axial blood or fluid collections.    The T2 skull base flow voids are preserved. Bone marrow signal intensity is unremarkable.  Mild mucosal thickening of bilateral maxillary antra.   Impression         1.  Small focus of acute cortical infarct in the right frontal lobe with associated microhemorrhage.    2.  1.1 cm cavernous malformation in the left middle cerebral peduncle.  ______________________________________

## 2017-08-06 NOTE — SUBJECTIVE & OBJECTIVE
NIH Stroke Scale:  Interval: 7 days or at discharge (whichever comes first)  Level of Consciousness: 0 - alert  LOC Questions: 0 - answers both correctly  LOC Commands: 0 - performs both correctly  Best Gaze: 0 - normal  Visual: 0 - no visual loss  Facial Palsy: 0 - normal  Motor Left Arm: 0 - no drift  Motor Right Arm: 0 - no drift  Motor Left Le - no drift  Motor Right Le - no drift  Limb Ataxia: 0 - absent  Sensory: 0 - normal  Best Language: 0 - no aphasia  Dysarthria: 0 - normal articulation  Extinction and Inattention: 0 - no neglect  NIH Stroke Scale Total: 0  Modified [place] Scale:   Timeline: At discharge  Modified Bath Score: 0 - no symptoms      MRI 17  IMPRESSION:    1.  Small focus of acute cortical infarct in the right frontal lobe with associated hemorrhage.     2.  1.1 cm cavernous malformation in the left middle cerebral peduncle.     CT head 17  1.0 cm hyperdense focus within the left middle cerebellar peduncle without surrounding edema.  This finding is nonspecific and may represent hemorrhage versus cavernoma, however hemorrhage is thought less likely.  Further evaluation may be obtained with MRI.    No CT evidence of acute cerebrovascular infarction.  MRI followup as needed.      US carotid 17   No evidence of hemodynamically significant stenosis.    CTA head and neck - 17  1.  No acute intracranial hemorrhage or major vascular distribution infarct.    2.  CTA of the head and neck are within normal limits without evidence of significant stenosis, occlusion or aneurysm. There is concern for R sided carotid web     Echo 17  left atrial volume index is mildly enlarged  CONCLUSIONS     1 - Mild left atrial enlargement.     2 - Normal left ventricular systolic function (EF 60-65%).     3 - Impaired LV relaxation, elevated LAP (grade 2 diastolic dysfunction).     4 - Normal right ventricular systolic function .     5 - Mild mitral regurgitation.     6 - The estimated PA  systolic pressure is 36 mmHg.

## 2017-08-06 NOTE — ASSESSMENT & PLAN NOTE
No known dx of afib  Controlled- takes atenolol  Also with enlarged LA on echo - will order 30 day event monitor

## 2017-08-06 NOTE — DISCHARGE SUMMARY
[facility]  Vascular Neurology  Comprehensive Stroke Center  Discharge Summary     Summary:     Admit Date: 8/4/2017  6:15 PM    Discharge Date and Time: 8/5/2017  5:43 PM    Attending Physician- Dr Chester Palomino     Discharge Provider: MAHENDRA Henderson    History of Present Illness: 62 year old left handed male presents to the ED after what he describes as trouble getting his words out x 30 seconds today while at work. The patient also reports bilateral fingertip tingling at the time. The patient reports no prior history of similar, and states that he did not have any facial droop, headache, walking problems, weakness, or coordination problems during this event. The patient takes home daily baby asa and atenolol for rapid heart beat (dx in 1996 - was not told afib). Denies recent palpitations or irregular heart beat.       Hospital Course (synopsis of major diagnoses, care, treatment, and services provided during the course of the hospital stay): 62 year old male with episode of expressive aphasia today which has currently resolved. Noted to have right sided frontal infarct on MRI which is responsible for language issues in this left handed gentleman.    CTA with concern for carotid web, per dr nolan attestation on H&P:  Suspect embolic source from atypical fibromuscular dysplasia (carotid web) which is identified on the contralateral ICA and I suspect is also present with associated thrombus on the ipsilateral side.    Patient seen and examined by Dr. Gamez and Vascular surgery fellow - with plans to use DAPT and high dose statin until follow up on Friday with tentative plans for surgery the following week    Also with enlarged La on echo - will order 30 day event monitor for the patient.     Given strict instructions regarding ED warnings and stroke signs. Patient lives alone but sister lives a couple of houses down, they talk multiple times throughout the day, developed plan with  patient for regular intervals of calls and for patients sister to check on him If he misses a call. Re-reviewed stroke signs and symptoms. Should return immediately with any new symptoms, patient expressed understanding to myself and Dr. Palomino   Patient comfortable with discharge plan, currently asymptomatic  No therapy needs.     NIH Stroke Scale:  Interval: 7 days or at discharge (whichever comes first)  Level of Consciousness: 0 - alert  LOC Questions: 0 - answers both correctly  LOC Commands: 0 - performs both correctly  Best Gaze: 0 - normal  Visual: 0 - no visual loss  Facial Palsy: 0 - normal  Motor Left Arm: 0 - no drift  Motor Right Arm: 0 - no drift  Motor Left Le - no drift  Motor Right Le - no drift  Limb Ataxia: 0 - absent  Sensory: 0 - normal  Best Language: 0 - no aphasia  Dysarthria: 0 - normal articulation  Extinction and Inattention: 0 - no neglect  NIH Stroke Scale Total: 0  Modified [place] Scale:   Timeline: At discharge  Modified [place] Score: 0 - no symptoms      MRI 17  IMPRESSION:    1.  Small focus of acute cortical infarct in the right frontal lobe with associated hemorrhage.     2.  1.1 cm cavernous malformation in the left middle cerebral peduncle.     CT head 17  1.0 cm hyperdense focus within the left middle cerebellar peduncle without surrounding edema.  This finding is nonspecific and may represent hemorrhage versus cavernoma, however hemorrhage is thought less likely.  Further evaluation may be obtained with MRI.    No CT evidence of acute cerebrovascular infarction.  MRI followup as needed.      US carotid 17   No evidence of hemodynamically significant stenosis.    CTA head and neck - 17  1.  No acute intracranial hemorrhage or major vascular distribution infarct.    2.  CTA of the head and neck are within normal limits without evidence of significant stenosis, occlusion or aneurysm. There is concern for R sided carotid web     Echo 17  left atrial  volume index is mildly enlarged  CONCLUSIONS     1 - Mild left atrial enlargement.     2 - Normal left ventricular systolic function (EF 60-65%).     3 - Impaired LV relaxation, elevated LAP (grade 2 diastolic dysfunction).     4 - Normal right ventricular systolic function .     5 - Mild mitral regurgitation.     6 - The estimated PA systolic pressure is 36 mmHg.       Assessment/Plan:     Interventions: None    Complications: None    Research Candidate?:  No    Neurological deficit at discharge: None     Disposition: Home or Self Care    Final Active Diagnoses:    Diagnosis Date Noted POA    PRINCIPAL PROBLEM:  Embolic stroke involving right middle cerebral artery [I63.411] 08/04/2017 Yes    Fibromuscular dysplasia [I77.3] 08/05/2017 Yes    Cytotoxic cerebral edema [G93.6] 08/05/2017 Yes    Rapid heart beat [R00.0] 08/04/2017 Yes    Cavernoma [D18.01] 08/04/2017 Not Applicable    Aphasia [R47.01] 08/04/2017 Yes    Expressive aphasia [R47.01] 08/04/2017 Yes      Problems Resolved During this Admission:    Diagnosis Date Noted Date Resolved POA     * Embolic stroke involving right middle cerebral artery    Right frontal area with left cerebellar peduncle cavernoma.     Symptoms correlate as patient is a left handed individual with right hemispheric ischemic stroke and episode of expressive aphasia     Increase ASA to 325 daily, add plavix   crestor 40 daily  - LDL > 70   Work up completed - follow up with vascular surgery for web intervention         Cytotoxic cerebral edema    Due to stroke, evident on imaging         Fibromuscular dysplasia    Carotid web noted, evaluated by vascular surgery         Aphasia    Resolved - due to stroke         Cavernoma    Can follow outpatient with neurosurg - asymptomatic         Rapid heart beat    No known dx of afib  Controlled- takes atenolol  Also with enlarged LA on echo - will order 30 day event monitor               Recommendations:     Post-discharge complication  risks: None    Stroke Education given to: patient    Follow-up in Stroke Clinic in 4-6 weeks  Vascular surgery - dr blanton Friday   PCP in one week     Discharge Plan:  Antithrombotics: Aspirin 325mg, Clopidogrel 75mg  Statin: Rosuvastatin 40mg  Aggresive risk factor modification:  Hypertension, High Cholesterol, Diet, Exercise, Obesity and Carotid Artery disease  Potential carotid intervention in upcoming week     Follow Up:  Follow-up Information     [facility]. Schedule an appointment as soon as possible for a visit in 1 week.    Contact information:  [address]  [facility] [address]  [phone]             Joseph Blanton MD In 5 days.    Specialty:  Vascular Surgery  Contact information:  [address]  [facility] [id number] [phone]             [facility] VASCULAR NEUROLOGY. Schedule an appointment as soon as possible for a visit in 4 weeks.    Specialty:  Vascular Neurology  Contact information:  [address]  [facility] [address]  [phone]               Patient Instructions:     Ambulatory Referral to Vascular Surgery   Referral Priority: Routine Referral Type: Consultation   Referral Reason: Specialty Services Required    Referred to Provider: JOSEPH BLANTON Requested Specialty: Vascular Surgery   Number of Visits Requested: 1      Diet Cardiac   Order Comments: See Stroke Patient Education Guide Booklet for details.     Call 911 for any of the following:   Order Comments: Call 911  right away if any of the following warning signs come on suddenly, even if the symptoms only last for a few minutes. With stroke, timing is very important.   - Warning Signs of Stroke:  - Weakness: You may feel a sudden weakness, tingling or loss of feeling on one side of your face or body.  - Vision Problems: You may have sudden double vision or trouble seeing in one or both eyes.  - Speech Problems: You may have sudden trouble talking, slured speech, or  problems understanding others.  - Headache: You may have sudden, severe headache.  - Movement Problems: You may experience dizziness, a feeling of spinning, a loss of balance, a feeling of falling or blackouts.       Medications:  Reconciled Home Medications:   Discharge Medication List as of 8/5/2017  4:25 PM      START taking these medications    Details   clopidogrel (PLAVIX) 75 mg tablet Take 1 tablet (75 mg total) by mouth once daily., Starting Sat 8/5/2017, Until Sun 8/5/2018, Print      rosuvastatin (CRESTOR) 40 MG Tab Take 1 tablet (40 mg total) by mouth every evening., Starting Sat 8/5/2017, Print         CONTINUE these medications which have CHANGED    Details   aspirin (ECOTRIN) 325 MG EC tablet Take 1 tablet (325 mg total) by mouth once daily., Starting Sat 8/5/2017, Until Sun 8/5/2018, Print         CONTINUE these medications which have NOT CHANGED    Details   atenolol (TENORMIN) 50 MG tablet Take 50 mg by mouth once daily., Historical Med         STOP taking these medications       naproxen (EC NAPROSYN) 500 MG EC tablet Comments:   Reason for Stopping:               MAHENDRA Henderson  Comprehensive Stroke Center  Department of Vascular Neurology   [facility]

## 2017-08-06 NOTE — ASSESSMENT & PLAN NOTE
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     Symptoms correlate as patient is a left handed individual with right hemispheric ischemic stroke and episode of expressive aphasia     Increase ASA to 325 daily, add plavix   crestor 40 daily  - LDL > 70   Work up completed - follow up with vascular surgery for web intervention

## 2017-08-07 ENCOUNTER — TELEPHONE (OUTPATIENT)
Dept: NEUROSURGERY | Facility: HOSPITAL | Age: 62
End: 2017-08-07

## 2017-08-07 ENCOUNTER — CLINICAL SUPPORT (OUTPATIENT)
Dept: ELECTROPHYSIOLOGY | Facility: CLINIC | Age: 62
End: 2017-08-07
Payer: COMMERCIAL

## 2017-08-07 DIAGNOSIS — I63.311 STROKE DUE TO THROMBOSIS OF RIGHT MIDDLE CEREBRAL ARTERY: ICD-10-CM

## 2017-08-07 PROCEDURE — 93268 ECG RECORD/REVIEW: CPT | Mod: S$GLB,,, | Performed by: INTERNAL MEDICINE

## 2017-08-07 NOTE — TELEPHONE ENCOUNTER
Called number on file.  Spoke with patient.  Risk factors specific to patient for stroke discussed with teach back implemented.  Patient verbalized understanding of discharge instructions and medications.  Patient was asked about discharge appointments and follow up care.  No follow appointment scheduled thus far.  Message sent to Neuro Clinic on patient's behalf to schedule follow up appointment. Warning signs discussed with teach back discussion method implemented.  Notified to seek immediate medical help via 911 if new or worsening stroke symptoms occur.  Patient relayed no new questions or comments at this time.  Instructed to call [facility] with any further questions.

## 2017-08-08 ENCOUNTER — INITIAL CONSULT (OUTPATIENT)
Dept: VASCULAR SURGERY | Facility: CLINIC | Age: 62
End: 2017-08-08
Payer: COMMERCIAL

## 2017-08-08 ENCOUNTER — PATIENT OUTREACH (OUTPATIENT)
Dept: ADMINISTRATIVE | Facility: CLINIC | Age: 62
End: 2017-08-08

## 2017-08-08 VITALS
HEIGHT: 75 IN | TEMPERATURE: 98 F | SYSTOLIC BLOOD PRESSURE: 135 MMHG | DIASTOLIC BLOOD PRESSURE: 88 MMHG | WEIGHT: 225 LBS | BODY MASS INDEX: 27.98 KG/M2 | HEART RATE: 70 BPM | RESPIRATION RATE: 18 BRPM

## 2017-08-08 DIAGNOSIS — I65.29 STENOSIS OF CAROTID ARTERY, UNSPECIFIED LATERALITY: Primary | ICD-10-CM

## 2017-08-08 DIAGNOSIS — I63.9 CEREBRAL INFARCTION, UNSPECIFIED MECHANISM: Primary | ICD-10-CM

## 2017-08-08 PROCEDURE — 3008F BODY MASS INDEX DOCD: CPT | Mod: S$GLB,,, | Performed by: SURGERY

## 2017-08-08 PROCEDURE — 99999 PR PBB SHADOW E&M-EST. PATIENT-LVL III: CPT | Mod: PBBFAC,,, | Performed by: SURGERY

## 2017-08-08 PROCEDURE — 99214 OFFICE O/P EST MOD 30 MIN: CPT | Mod: S$GLB,,, | Performed by: SURGERY

## 2017-08-08 RX ORDER — NAPROXEN 500 MG/1
TABLET ORAL
Refills: 2 | COMMUNITY
Start: 2017-07-02 | End: 2018-06-05

## 2017-08-08 NOTE — PATIENT INSTRUCTIONS
Discharge Instructions for Stroke  You have been diagnosed with a stroke, or with a TIA (transient ischemic attack). Or you have been identified as having a high risk for stroke. During a stroke, blood stops flowing to part of your brain. This can damage areas in the brain that control other parts of the body. Symptoms after a stroke depend on which part of the brain has been affected.  Stroke risk factors  Once youve had a stroke, youre at greater risk for another one. Listed below are some other factors that can increase your risk for a stroke:  · High blood pressure  · High cholesterol  · Cigarette or cigar smoking  · Diabetes  · Carotid or other artery disease  · Atrial fibrillation, atrial flutter, or other heart disease  · Not being physically active  · Obesity  · Certain blood disorders (such as sickle cell anemia)  · Excessive alcohol use  · Abuse of street drugs  · Race  · Gender  · Family history of stroke  · Diet high in salty, fried, or greasy foods  Changes in daily living  Doing your regular tasks may be difficult after youve had a stroke, but you can learn new ways to manage your daily activities. In fact, doing daily activities may help you to regain muscle strength and bring back function to affected limbs. Be patient, give yourself time to adjust, and appreciate the progress you make.  Daily activities  You may be at risk of falling. Make changes to your home to help you walk more easily. A therapist will decide if you need an assistive device to walk safely.  You may need to see an occupational therapist or physical therapist to learn new ways of doing things. For example, you may need to make adjustments when bathing or dressing:  Tips for showering or bathing  · Test the water temperature with a hand or foot that was not affected by the stroke.  · Use grab bars, a shower seat, a hand-held showerhead, and a long-handled brush.  Tips for getting dressed  · Dress while sitting, starting with  the affected side or limb.  · Wear shirts that pull easily over your head. Wear pants or skirts with elastic waistbands.  · Use zippers with loops attached to the pull tabs.  Lifestyle changes  · Take your medicines exactly as directed. Dont skip doses.  · Begin an exercise program. Ask your provider how to get started. Also ask how much activity you should try to get on a daily or weekly basis. You can benefit from simple activities such as walking or gardening.  · Limit alcohol intake. Men should have no more than 2 alcoholic drinks a day. Women should limit themselves to 1 alcoholic drink per day.  · Know your cholesterol level. Follow your providers recommendations about how to keep cholesterol under control.  · If you are a smoker, quit now. Join a stop-smoking program to improve your chances of success. Ask your provider about medicines or other methods to help you quit.  · Learn stress management techniques to help you deal with stress in your home and work life.  Diet  Your healthcare provider will give you information on dietary changes that you may need to make, based on your situation. Your provider may recommend that you see a registered dietitian for help with diet changes. Changes may include:  · Reducing fat and cholesterol intake  · Reducing salt (sodium) intake, especially if you have high blood pressure  · Eating more fresh vegetables and fruits  · Eating more lean proteins, such as fish, poultry, and beans and peas (legumes)  · Eating less red meat and processed meats  · Using low-fat dairy products  · Limiting vegetable oils and nut oils  · Limiting sweets and processed foods such as chips, cookies, and baked goods  Follow-up care  · Keep your medical appointments. Close follow-up is important to stroke rehabilitation and recovery.  · Some medicines require blood tests to check for progress or problems. Keep follow-up appointments for any blood tests ordered by your providers.  When to call  911  Call 911 right away if you have any of the following symptoms of stroke:  · Weakness, tingling, or loss of feeling on one side of your face or body  · Sudden double vision or trouble seeing in one or both eyes  · Sudden trouble talking or slurred speech  · Trouble understanding others  · Sudden, severe headache  · Dizziness, loss of balance, or a sense of falling  · Blackouts or seizures      F.A.S.T. is an easy way to remember the signs of stroke. When you see these signs, you know that you need to call 911 fast.  F.A.S.T. stands for:  · F is for face drooping. One side of the face is drooping or numb. When the person smiles, the smile is uneven.  · A is for arm weakness. One arm is weak or numb. When the person lifts both arms at the same time, one arm may drift downward.  · S is for speech difficulty. You may notice slurred speech or trouble speaking. The person can't repeat a simple sentence correctly when asked.  · T is for time to call 911. If someone shows any of these symptoms, even if they go away, call 911 immediately. Make note of the time the symptoms first appeared.  Date Last Reviewed: 8/26/2015 © 2000-2017 [employer]. [address] [place] [address], [place] [address]. All rights reserved. This information is not intended as a substitute for professional medical care. Always follow your healthcare professional's instructions.

## 2017-08-08 NOTE — LETTER
August 10, 2017      [facility]  [address] [facility] [address]  [address]  [place] [address]           [facility]  [address]  [place] [address]  Phone: [phone]  Fax: [phone]          Patient: Reji Aleman   MR Number: [id number]   YOB: 1955   Date of Visit: 8/8/2017       Dear [facility]:    Thank you for referring Reji Aleman to me for evaluation. Attached you will find relevant portions of my assessment and plan of care.    If you have questions, please do not hesitate to call me. I look forward to following Reji Aleman along with you.    Sincerely,    Juice Gamez MD    Enclosure  CC:  No Recipients    If you would like to receive this communication electronically, please contact [email or web address] or [phone] to request more information on [facility] access.    For providers and/or their staff who would like to refer a patient to [facility], please contact us through our one-stop-shop provider referral line, Amadou Hernandez, at [phone].    If you feel you have received this communication in error or would no longer like to receive these types of communications, please e-mail [email or web address]

## 2017-08-09 ENCOUNTER — TELEPHONE (OUTPATIENT)
Dept: NEUROLOGY | Facility: CLINIC | Age: 62
End: 2017-08-09

## 2017-08-09 NOTE — TELEPHONE ENCOUNTER
----- Message from Bubba Acosta RN sent at 8/7/2017  3:12 PM CDT -----  Hey everyone,     I was wondering if you could schedule Mr. Aleman a follow up appointment in 4-6 weeks with a stroke provider.     Thanks so much for all you do for myself and our stroke patients,  SARA RenoN-RN

## 2017-08-10 NOTE — PROGRESS NOTES
Reji Aleman  08/10/2017    HPI:  Patient is a 62 y.o. male who is here today for evaluation following admission after experiencing transient expressive aphasia and bilateral hand tingling.  He was found on MRI to have a right hemispheric lesion consistent with acute stroke. On CTA of the neck, a small carotid web was identified CONTRA-lateral to the stroke.  The vascular neurology attending was concerned that this pathology may also exist in the right ICA, IPSI-lateral to the stroke.   He had an echocardiogram that was normal, without thrombus.     Since the initial event, he has experienced no further symptoms. He initiated asa, statin and plavix while admitted.       He has never smoked.     He denies history of MI.     Past Medical History:   Diagnosis Date    Embolic stroke involving right middle cerebral artery 8/4/2017    Hypertension      History reviewed. No pertinent surgical history.  History reviewed. No pertinent family history.  Social History     Social History    Marital status: Single     Spouse name: N/A    Number of children: N/A    Years of education: N/A     Occupational History    Not on file.     Social History Main Topics    Smoking status: Never Smoker    Smokeless tobacco: Never Used    Alcohol use No    Drug use: No    Sexual activity: Not on file     Other Topics Concern    Not on file     Social History Narrative    No narrative on file     Current Outpatient Prescriptions on File Prior to Visit   Medication Sig    aspirin (ECOTRIN) 325 MG EC tablet Take 1 tablet (325 mg total) by mouth once daily.    atenolol (TENORMIN) 50 MG tablet Take 50 mg by mouth once daily.    clopidogrel (PLAVIX) 75 mg tablet Take 1 tablet (75 mg total) by mouth once daily.    rosuvastatin (CRESTOR) 40 MG Tab Take 1 tablet (40 mg total) by mouth every evening.     No current facility-administered medications on file prior to visit.        REVIEW OF SYSTEMS:  General: negative; ENT: negative;  Allergy and Immunology: negative; Hematological and Lymphatic: Negative; Endocrine: negative; Respiratory: no cough, shortness of breath, or wheezing; Cardiovascular: no chest pain or dyspnea on exertion; Gastrointestinal: no abdominal pain/back, change in bowel habits, or bloody stools; Genito-Urinary: no dysuria, trouble voiding, or hematuria; Musculoskeletal: negative  Neurological: no TIA or stroke symptoms    PHYSICAL EXAM:   Right Arm BP - Sittin/88 (17 1643)  Left Arm BP - Sittin/96 (17 1643)  Pulse: 70  Temp: 97.8 °F (36.6 °C)      General appearance:  Alert, well-appearing, and in no distress.  Oriented to person, place, and time   Neurological: Normal speech, no focal findings noted; CN II - XII grossly intact           Musculoskeletal: Digits/nail without cyanosis/clubbing.  Normal muscle strength/tone.                 Neck: Supple, no significant adenopathy; thyroid is not enlarged                  No carotid bruit can be auscultated                Chest:  Clear to auscultation, no wheezes, rales or rhonchi, symmetric air entry     No use of accessory muscles             Cardiac: Normal rate and regular rhythm, S1 and S2 normal; PMI non-displaced          Abdomen: Soft, nontender, nondistended, no masses or organomegaly     No rebound tenderness noted; bowel sounds normal     Pulsatile aortic mass is not palpable.     No groin adenopathy      Extremities:   2+ femoral pulses bilaterally     2+ Popliteal pulses; 2+ pedal pulses palpable.     No pedal edema     No ulcerations    LAB RESULTS:  Lab Results   Component Value Date    K 3.7 2017    K 3.9 2017    CREATININE 1.2 2017    CREATININE 1.3 2017     Lab Results   Component Value Date    WBC 4.60 2017    WBC 4.58 2017    HCT 38.2 (L) 2017    HCT 41.0 2017     2017     2017     No results found for: HGBA1C  IMAGING:  CTA of the neck reviewed in detail:  possible small left ICA web, normal right CCA and ICA    Carotid duplex is normal          IMP/PLAN:  62 y.o. male with a history of transient expressive aphasia and right hemispheric stroke last week.  There is no identifiable pathology in his right ICA.  His echocardiogram was negative for thrombus.  He is now on asa, plavix, and a statin and has not experienced repeat symptoms.  I would suspect a cardiac etiology for his stroke, despite the negative echo, given the lack of any identifiable atherosclerotic disease in the right CCA/ICA.   Images and case reviewed with my , who agrees with this assessment.  No intervention is indicated at this time.  I would consider systemically anticoagulating this patient if no etiology for the stroke is identified.     1) continue asa, plavix, statin  2) RTC with vascular neruology for follow up  3) to consider systemic anticoagulation, especially if a repeat event occurs, which would suggest an occult cardiac cause given the normal appearance of the R ICA on CTA.      Juice Gamez MD  Vascular & Endovascular Surgery

## 2017-09-19 ENCOUNTER — OFFICE VISIT (OUTPATIENT)
Dept: NEUROLOGY | Facility: CLINIC | Age: 62
End: 2017-09-19
Payer: COMMERCIAL

## 2017-09-19 VITALS
SYSTOLIC BLOOD PRESSURE: 120 MMHG | WEIGHT: 223.31 LBS | HEART RATE: 66 BPM | DIASTOLIC BLOOD PRESSURE: 80 MMHG | HEIGHT: 75 IN | BODY MASS INDEX: 27.77 KG/M2

## 2017-09-19 DIAGNOSIS — I63.411 EMBOLIC STROKE INVOLVING RIGHT MIDDLE CEREBRAL ARTERY: ICD-10-CM

## 2017-09-19 DIAGNOSIS — I48.0 PAROXYSMAL A-FIB: Primary | ICD-10-CM

## 2017-09-19 PROCEDURE — 3008F BODY MASS INDEX DOCD: CPT | Mod: S$GLB,,, | Performed by: PHYSICIAN ASSISTANT

## 2017-09-19 PROCEDURE — 99999 PR PBB SHADOW E&M-EST. PATIENT-LVL III: CPT | Mod: PBBFAC,,, | Performed by: PHYSICIAN ASSISTANT

## 2017-09-19 PROCEDURE — 99214 OFFICE O/P EST MOD 30 MIN: CPT | Mod: S$GLB,,, | Performed by: PHYSICIAN ASSISTANT

## 2017-09-19 NOTE — LETTER
September 19, 2017      Emergency Scribe  [address] [facility] [address]           [facility]rology  [address]  [facility] [address]  Phone: [phone]  Fax: [phone]          Patient: Reji Aleman Jr.   MR Number: [id number]   YOB: 1955   Date of Visit: 9/19/2017       Dear Scribe Emergency:    Thank you for referring Reji Aleman to me for evaluation. Attached you will find relevant portions of my assessment and plan of care.    If you have questions, please do not hesitate to call me. I look forward to following Reji Aleman along with you.    Sincerely,    Jimena Trevino PA-C    Enclosure  CC:  No Recipients    If you would like to receive this communication electronically, please contact [email or web address] or [phone] to request more information on [facility] Link access.    For providers and/or their staff who would like to refer a patient to [facility], please contact us through our one-stop-shop provider referral line, [facility], at [phone].    If you feel you have received this communication in error or would no longer like to receive these types of communications, please e-mail [email or web address]

## 2017-09-19 NOTE — PROGRESS NOTES
[facility], Department of Neurology   [facility] [place] [address]  Phone:[phone]  Fax: [phone]    Patient name: Reji Aleman Jr.  : 1955  MRN: [id number]    2017      Chief complaint:   Chief Complaint   Patient presents with    Consult       PCP: [facility]    Assessment:     ICD-10-CM ICD-9-CM    1. Paroxysmal a-fib I48.0 427.31 rivaroxaban (XARELTO) 20 mg Tab      Ambulatory Referral to Cardiology   2. Embolic stroke involving right middle cerebral artery I63.411 434.11 Ambulatory Referral to Cardiology       Dx: R frontal infarct, cardioembolic from newly dx afib   Stroke risk factors: HLD, afib     Plan:  -begin Xarelto 20 mg qd  -stop ASA and plavix  -continue Crestor   -needs to establish care with cardiology, referral placed   -Continue f/u with PCP regarding stroke risk factor modification as outlined below and discussed with patient.   -RTC 2 mos     Stroke education: Continue to address with PCP these risk factor guidelines: SBP<130, FBS<100, LDL<70 mg/dL, antiplatelet. Continue exercise as tolerated, avoid tobacco, abstain from ETOH (<3 bevs optimal a week), stay well hydrated, avoid PO intake of NaCl, regular sleep. Will have patient continue to address this with PCP. Discussed CVA symptoms with patient at length, etiology of CVA and need to remain compliant on all medications. Mentioned that medication is preventative however nothing is absolute. Robust recovery first 4-6 months up to a year for noticeable improvement. If symptomatic, will need to report to ED in <2h for prompt eval. Patient voiced understanding.  All questions answered. The patient indicates understanding of these issues and agrees to the plan.    HPI:  Reji Aleman Jr. is a 61 yo male with HLD presents for stroke f/u.   He presented 17 after episode of expressive aphasia lasting few seconds-minute. Imaging showed R frontal infarct. 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 There was question whether this same abnormality may have been present on the RIGHT ICA as etiology of stroke. Pt seen by vascular surgery however without evidence of RIGHT ICA abnormality. 30 day holter which recently resulted with paroxysmal afib.     He is doing well, denies new stroke signs/symptoms. Denies HA, sleep problems, or depression.   Currently on ASA, Plavix, and Crestor 40 mg.       Cerebrovascular history:   8/4-8/5/17  History of Present Illness: 62 year old left handed male presents to the ED after what he describes as trouble getting his words out x 30 seconds today while at work. The patient also reports bilateral fingertip tingling at the time. The patient reports no prior history of similar, and states that he did not have any facial droop, headache, walking problems, weakness, or coordination problems during this event. The patient takes home daily baby asa and atenolol for rapid heart beat (dx in 1996 - was not told afib). Denies recent palpitations or irregular heart beat.          Hospital Course (synopsis of major diagnoses, care, treatment, and services provided during the course of the hospital stay): 62 year old male with episode of expressive aphasia today which has currently resolved. Noted to have right sided frontal infarct on MRI which is responsible for language issues in this left handed gentleman.     CTA with concern for carotid web, per dr nolan attestation on H&P:  Suspect embolic source from atypical fibromuscular dysplasia (carotid web) which is identified on the contralateral ICA and I suspect is also present with associated thrombus on the ipsilateral side.     Patient seen and examined by Dr. Gamez and Vascular surgery fellow - with plans to use DAPT and high dose statin until follow up on Friday with tentative plans for surgery the following week     Also with enlarged La on echo - will order 30 day event  monitor for the patient.      Given strict instructions regarding ED warnings and stroke signs. Patient lives alone but sister lives a couple of houses down, they talk multiple times throughout the day, developed plan with patient for regular intervals of calls and for patients sister to check on him If he misses a call. Re-reviewed stroke signs and symptoms. Should return immediately with any new symptoms, patient expressed understanding to myself and Dr. Palomino   Patient comfortable with discharge plan, currently asymptomatic  No therapy needs.       Medications:   Current Outpatient Prescriptions   Medication Sig Dispense Refill    aspirin (ECOTRIN) 325 MG EC tablet Take 1 tablet (325 mg total) by mouth once daily. 30 tablet 1    atenolol (TENORMIN) 50 MG tablet Take 50 mg by mouth once daily.      clopidogrel (PLAVIX) 75 mg tablet Take 1 tablet (75 mg total) by mouth once daily. 30 tablet 1    naproxen (NAPROSYN) 500 MG tablet TAKE 1 TABLET BYMOUTH TWICE A DAY WITH FOOD  2    rosuvastatin (CRESTOR) 40 MG Tab Take 1 tablet (40 mg total) by mouth every evening. 30 tablet 1    rivaroxaban (XARELTO) 20 mg Tab Take 1 tablet (20 mg total) by mouth daily with dinner or evening meal. 30 tablet 11     No current facility-administered medications for this visit.        Allergies:  Review of patient's allergies indicates:   Allergen Reactions    Sulfa (sulfonamide antibiotics)        PMHx:  Past Medical History:   Diagnosis Date    Embolic stroke involving right middle cerebral artery 8/4/2017    Hypertension      History reviewed. No pertinent surgical history.    Fhx:  History reviewed. No pertinent family history.    Shx:   Social History     Social History    Marital status: Single     Spouse name: N/A    Number of children: N/A    Years of education: N/A     Occupational History    Not on file.     Social History Main Topics    Smoking status: Never Smoker    Smokeless tobacco: Never Used    Alcohol use  No    Drug use: No    Sexual activity: Not on file     Other Topics Concern    Not on file     Social History Narrative    No narrative on file       Labs:  Results for KI CRUZ JR. (MRN [id number]) as of 9/19/2017 13:42   Ref. Range 8/4/2017 18:25   Cholesterol Latest Ref Range: 120 - 199 mg/dL 202 (H)   HDL Latest Ref Range: 40 - 75 mg/dL 50   LDL Cholesterol Latest Ref Range: 63.0 - 159.0 mg/dL 89.0   Total Cholesterol/HDL Ratio Latest Ref Range: 2.0 - 5.0  4.0   Triglycerides Latest Ref Range: 30 - 150 mg/dL 315 (H)       Imaging:  CT head 8/4/17  Impression    1.0 cm hyperdense focus within the left middle cerebellar peduncle without surrounding edema.  This finding is nonspecific and may represent hemorrhage versus cavernoma, however hemorrhage is thought less likely.  Further evaluation may be obtained with MRI.    No CT evidence of acute cerebrovascular infarction.  MRI followup as needed.     MRI brain 8/3/17  Impression   1.  Small focus of acute cortical infarct in the right frontal lobe with associated microhemorrhage.    2.  1.1 cm cavernous malformation in the left middle cerebral peduncle.     CTA head neck 8/4/17  Impression   1.  No acute intracranial hemorrhage or major vascular distribution infarct.    2.  CTA of the head and neck are within normal limits without evidence of significant stenosis, occlusion or aneurysm.     Carotid US 8/5/17  Impression    No evidence of hemodynamically significant stenosis.     Echo  8/5/17  CONCLUSIONS     1 - Mild left atrial enlargement.     2 - Normal left ventricular systolic function (EF 60-65%).     3 - Impaired LV relaxation, elevated LAP (grade 2 diastolic dysfunction).     4 - Normal right ventricular systolic function .     5 - Mild mitral regurgitation.     6 - The estimated PA systolic pressure is 36 mmHg.     Cardiac event monitor 8/7/17  SUMMARY:  In summary, this event monitor is consistent with paroxysmal atrial fibrillation without rapid  "ventricular conduction.  Clinical correlation is advised.       ROS:   Review Of Systems (questions asked, positive or additions in BOLD)  Gen: Weight change, fatigue/malaise, pyrexia   HEENT: Tinnitus, headache,  blurred vision, eye pain, diplopia, photophobia   Card: Palpitations, CP   Pulm: SOB   Vas: Easy bruising, easy bleeding   GI: N/V/D/C, incontinence, hematemesis, hematochezia    : incontinence, hematuria   Endocrine: Temp intolerance, polyuria, polydipsia   M/S: Neck pain, myalgia, back pain, joint pain, falls    Neuro: PER HPI   PSY: Memory loss, confusion, depression, anxiety, trouble in sleep      Physical Exam:  /80   Pulse 66   Ht 6' 3" (1.905 m)   Wt 101.3 kg (223 lb 5.2 oz)   BMI 27.91 kg/m²     Well developed, well nourished male  Extremities: no edema    NIH Stroke Scale:  Level of Consciousness: 0 - alert  LOC Questions: 0 - answers both correctly  LOC Commands: 0 - performs both correctly  Best Gaze: 0 - normal  Visual: 0 - no visual loss  Facial Palsy: 0 - normal  Motor Left Arm: 0 - no drift  Motor Right Arm: 0 - no drift  Motor Left Le - no drift  Motor Right Le - no drift  Limb Ataxia: 0 - absent  Sensory: 0 - normal  Best Language: 0 - no aphasia  Dysarthria: 0 - normal articulation  Extinction and Inattention: 0 - no neglect    NIH: 0  Mental status:                        Awake, alert and appropriately oriented                        Normal recent and remote memory                        Normal attention and concentration                        Normal speech and language                        Normal fund of knowledge                        No extinction  Cranial nerves:                        PERRLA                        EOMF without nystagmus                        VFF                        Normal facial sensation                        Normal facial movements                        Intact hearing bilaterally                        Palate elevates symmetrically    "                     Normal SCM and trapezius strength                        Tongue midline  Motor:                        No pronator drift                        Normal FF movements bilaterally                        Normal muscle tone, bulk and power                        No abnormal movements  Sensory                        Intact to LT  DTRs                        2+ and symmetric  Coordination                        Intact to FNF and HTS  Gait                        Normal base and gait        Attending, Dr. Steiner, was available during today's encounter. Any change to plan along with cosign to appear in the EMR.       This document has been electronically signed by Jimena Trevino PA-C on 9/19/2017, 1:06 PM. I have personally typed this message using the EMR.       Jimena Trevino PA-C  Department of Neurology   [facility] [place], [place]

## 2017-09-26 ENCOUNTER — OFFICE VISIT (OUTPATIENT)
Dept: CARDIOLOGY | Facility: CLINIC | Age: 62
End: 2017-09-26
Payer: COMMERCIAL

## 2017-09-26 VITALS
WEIGHT: 225.75 LBS | HEART RATE: 80 BPM | SYSTOLIC BLOOD PRESSURE: 132 MMHG | DIASTOLIC BLOOD PRESSURE: 78 MMHG | HEIGHT: 75 IN | BODY MASS INDEX: 28.07 KG/M2

## 2017-09-26 DIAGNOSIS — I63.411 EMBOLIC STROKE INVOLVING RIGHT MIDDLE CEREBRAL ARTERY: Primary | ICD-10-CM

## 2017-09-26 DIAGNOSIS — I10 ESSENTIAL HYPERTENSION: ICD-10-CM

## 2017-09-26 DIAGNOSIS — I48.0 PAROXYSMAL A-FIB: ICD-10-CM

## 2017-09-26 PROCEDURE — 3008F BODY MASS INDEX DOCD: CPT | Mod: S$GLB,,, | Performed by: INTERNAL MEDICINE

## 2017-09-26 PROCEDURE — 99999 PR PBB SHADOW E&M-EST. PATIENT-LVL IV: CPT | Mod: PBBFAC,,, | Performed by: INTERNAL MEDICINE

## 2017-09-26 PROCEDURE — 99204 OFFICE O/P NEW MOD 45 MIN: CPT | Mod: S$GLB,,, | Performed by: INTERNAL MEDICINE

## 2017-09-26 RX ORDER — AMLODIPINE BESYLATE 10 MG/1
TABLET ORAL
Qty: 45 TABLET | Refills: 3 | Status: SHIPPED | OUTPATIENT
Start: 2017-09-26 | End: 2018-09-17 | Stop reason: SDUPTHER

## 2017-09-26 NOTE — LETTER
September 26, 2017      Jimena Trevino PA-C  [address] [facility] [address]           [facility] - Cardiology  [address]  [facility] [address]  Phone: [phone]          Patient: Reji Aleman Jr.   MR Number: [id number]   YOB: 1955   Date of Visit: 9/26/2017       Dear Jimena Trevino:    Thank you for referring Reji Aleman to me for evaluation. Attached you will find relevant portions of my assessment and plan of care.    If you have questions, please do not hesitate to call me. I look forward to following Reji Aleman along with you.    Sincerely,    Sahra Gardner MD    Enclosure  CC:  No Recipients    If you would like to receive this communication electronically, please contact [email or web address] or [phone] to request more information on [employer] [facility] access.    For providers and/or their staff who would like to refer a patient to [facility], please contact us through our one-stop-shop provider referral line, [facility] , at [phone].    If you feel you have received this communication in error or would no longer like to receive these types of communications, please e-mail [email or web address]

## 2017-09-26 NOTE — Clinical Note
Sending you this patient for paf with recent CVA for consideration for ablation, I quoted him a 70% success rate he is asymptomatic(palpitations but had the CVA) will he qualify?

## 2017-09-26 NOTE — PROGRESS NOTES
Subjective:   Patient ID:  Reji Aleman Jr. is a 62 y.o. male is a new patient who presents for evaluation of Atrial Fibrillation  Dx: R frontal infarct, cardioembolic from newly dx afib   Stroke risk factors: HLD, afib   CVA sx resolved.   Asked to be taken off ASA and Plavix and started on Xeralto.      MRI brain:  Small focus of acute cortical infarct in the right frontal lobe with associated microhemorrhage.    HPI:   Non smoker  No family history of heart disease. Sister had Mitral valve disease she had Mitral valve repair.   Does not measure BP at home.   Occasional exercise.     Echo:    1 - Mild left atrial enlargement.     2 - Normal left ventricular systolic function (EF 60-65%).     3 - Impaired LV relaxation, elevated LAP (grade 2 diastolic dysfunction).     4 - Normal right ventricular systolic function .     5 - Mild mitral regurgitation.     6 - The estimated PA systolic pressure is 36 mmHg.       Event Monitor:  EVENT DETAILS:  The patient's presenting rhythm was sinus rhythm with a rate range of 74-78 beats per minute.  There were three other patient triggered events.  One of them was labeled as routine testing, one was labeled a followup test and one did not   have any symptoms entered.  The initial one without symptoms entered occurred on August 16 at 12:40 a.m. and the activation was consistent with atrial fibrillation with a ventricular rate range of 75-97 beats per minute.  A followup test 17 minutes later   noted continued atrial fibrillation at a ventricular rate range of 67-97 beats per minute.  Another followup test at 09:32 that morning showed sinus rhythm with PACs with a rate range of 72-74 beats per minute.  There were no other triggered events.    SUMMARY:  In summary, this event monitor is consistent with paroxysmal atrial fibrillation without rapid ventricular conduction.  Clinical correlation is advised.     Patient Active Problem List   Diagnosis    Embolic stroke involving right  "middle cerebral artery    Rapid heart beat    Cavernoma    Aphasia    Expressive aphasia    Fibromuscular dysplasia    Cytotoxic cerebral edema     /78   Pulse 80   Ht 6' 3" (1.905 m)   Wt 102.4 kg (225 lb 12 oz)   BMI 28.22 kg/m²   Body mass index is 28.22 kg/m².  CrCl cannot be calculated (Patient's most recent lab result is older than the maximum 7 days allowed.).    Lab Results   Component Value Date     08/05/2017    K 3.7 08/05/2017     08/05/2017    CO2 25 08/05/2017    BUN 12 08/05/2017    CREATININE 1.2 08/05/2017    GLU 84 08/05/2017    MG 1.9 08/05/2017    AST 17 08/05/2017    ALT 15 08/05/2017    ALBUMIN 3.4 (L) 08/05/2017    PROT 6.2 08/05/2017    BILITOT 0.7 08/05/2017    WBC 4.60 08/05/2017    HGB 14.0 08/05/2017    HCT 38.2 (L) 08/05/2017    MCV 78 (L) 08/05/2017     08/05/2017    INR 0.9 08/05/2017    TSH 2.278 08/04/2017    CHOL 202 (H) 08/04/2017    HDL 50 08/04/2017    LDLCALC 89.0 08/04/2017    TRIG 315 (H) 08/04/2017       Current Outpatient Prescriptions   Medication Sig    atenolol (TENORMIN) 50 MG tablet Take 50 mg by mouth once daily.    rivaroxaban (XARELTO) 20 mg Tab Take 1 tablet (20 mg total) by mouth daily with dinner or evening meal.    rosuvastatin (CRESTOR) 40 MG Tab Take 1 tablet (40 mg total) by mouth every evening.    amlodipine (NORVASC) 10 MG tablet Take 0.5 tab daily    aspirin (ECOTRIN) 325 MG EC tablet Take 1 tablet (325 mg total) by mouth once daily.    clopidogrel (PLAVIX) 75 mg tablet Take 1 tablet (75 mg total) by mouth once daily.    naproxen (NAPROSYN) 500 MG tablet TAKE 1 TABLET BYMOUTH TWICE A DAY WITH FOOD     No current facility-administered medications for this visit.        Review of Systems   Constitution: Negative for chills, decreased appetite, weakness, malaise/fatigue, night sweats, weight gain and weight loss.   Eyes: Negative for blurred vision, double vision, visual disturbance and visual halos.   Cardiovascular: " Negative for chest pain, claudication, cyanosis, dyspnea on exertion, irregular heartbeat, leg swelling, near-syncope, orthopnea, palpitations, paroxysmal nocturnal dyspnea and syncope.   Respiratory: Negative for cough, hemoptysis, snoring and wheezing.    Endocrine: Negative for cold intolerance, heat intolerance, polydipsia and polyphagia.   Hematologic/Lymphatic: Negative for adenopathy and bleeding problem. Does not bruise/bleed easily.   Skin: Negative for flushing, itching, poor wound healing and rash.   Musculoskeletal: Negative for arthritis, back pain, falls, gout, joint pain, joint swelling, muscle cramps, muscle weakness, myalgias, neck pain and stiffness.   Gastrointestinal: Negative for bloating, abdominal pain, anorexia, diarrhea, dysphagia, excessive appetite, flatus, hematemesis, jaundice, melena and nausea.   Genitourinary: Negative for hesitancy and incomplete emptying.   Neurological: Negative for aphonia, brief paralysis, difficulty with concentration, disturbances in coordination, excessive daytime sleepiness, dizziness, focal weakness, light-headedness and loss of balance.   Psychiatric/Behavioral: Negative for altered mental status, depression, hallucinations, hypervigilance, memory loss, substance abuse and suicidal ideas. The patient does not have insomnia and is not nervous/anxious.        Objective:   Physical Exam   Constitutional: He is oriented to person, place, and time. He appears well-developed and well-nourished. No distress.   HENT:   Head: Normocephalic and atraumatic.   Nose: Nose normal.   Mouth/Throat: No oropharyngeal exudate.   Eyes: Conjunctivae and EOM are normal. Pupils are equal, round, and reactive to light. Right eye exhibits no discharge. Left eye exhibits no discharge. No scleral icterus.   Neck: Normal range of motion. Neck supple. No JVD present. No tracheal deviation present. No thyromegaly present.   Cardiovascular: Normal rate, regular rhythm, normal heart  sounds and intact distal pulses.  Exam reveals no gallop and no friction rub.    No murmur heard.  Pulmonary/Chest: Effort normal and breath sounds normal. No stridor. No respiratory distress. He has no wheezes. He has no rales. He exhibits no tenderness.   Abdominal: Soft. Bowel sounds are normal. He exhibits no distension and no mass. There is no tenderness.   Musculoskeletal: He exhibits no edema or tenderness.   Lymphadenopathy:     He has no cervical adenopathy.   Neurological: He is alert and oriented to person, place, and time. He displays normal reflexes. No cranial nerve deficit. He exhibits normal muscle tone. Coordination normal.   Skin: Skin is warm. No rash noted. He is not diaphoretic. No erythema. No pallor.   Psychiatric: He has a normal mood and affect. His behavior is normal. Judgment and thought content normal.       Assessment:     1. Embolic stroke involving right middle cerebral artery    2. Essential hypertension    3. Paroxysmal a-fib        Plan:   Reji was seen today for atrial fibrillation.    Diagnoses and all orders for this visit:    Embolic stroke involving right middle cerebral artery    Essential hypertension  -     2D Echo w/ Bubble Contrast; Future    Paroxysmal a-fib  -     Ambulatory Referral to Electrophysiology    Other orders  -     amlodipine (NORVASC) 10 MG tablet; Take 0.5 tab daily      Ok to stop ASA and Plavix and continue Xeralto. Would still get a bubble study.   We discussed role of ablation for PAF even though asymptomatic.   Continue BP check at home.  Counseled on importance of heart healthy diet low in saturated and trans fat and salt as well gradually starting a regular aerobic exercise regimen with goal of 30min 5x/week. Recommend BP diary. Call if systolic BP > 140 mmHg on checking repeatedly]  All meds reviewed and are appropriate  Patient is compliant with his medications.

## 2017-10-06 DIAGNOSIS — I48.0 PAF (PAROXYSMAL ATRIAL FIBRILLATION): Primary | ICD-10-CM

## 2017-10-09 ENCOUNTER — HOSPITAL ENCOUNTER (OUTPATIENT)
Dept: CARDIOLOGY | Facility: CLINIC | Age: 62
Discharge: HOME | End: 2017-10-09
Payer: COMMERCIAL

## 2017-10-09 ENCOUNTER — DOCUMENTATION ONLY (OUTPATIENT)
Dept: CARDIOLOGY | Facility: CLINIC | Age: 62
End: 2017-10-09

## 2017-10-09 DIAGNOSIS — I10 ESSENTIAL HYPERTENSION: ICD-10-CM

## 2017-10-09 LAB
ESTIMATED PA SYSTOLIC PRESSURE: 35.76
RETIRED EF AND QEF - SEE NOTES: 60 (ref 55–65)

## 2017-10-09 PROCEDURE — 93307 TTE W/O DOPPLER COMPLETE: CPT | Mod: S$GLB,,, | Performed by: INTERNAL MEDICINE

## 2017-10-09 NOTE — PROGRESS NOTES
Patient identified by 2 identifiers. Denies previous reactions to blood transfusions and allergies reviewed.  Procedure explained & consent obtained.  22 g IV placed to Lt AC, flushed w/ 10cc NS pre & post contrast administration.  3cc Optison administered, echo images obtained.  Pt tolerated procedure well.  IV D/C'ed, preasure dsg applied.  Pt D/C'ed to home.

## 2017-10-11 ENCOUNTER — OFFICE VISIT (OUTPATIENT)
Dept: DERMATOLOGY | Facility: CLINIC | Age: 62
End: 2017-10-11
Payer: COMMERCIAL

## 2017-10-11 DIAGNOSIS — L98.9 DISEASE OF SKIN AND SUBCUTANEOUS TISSUE: Primary | ICD-10-CM

## 2017-10-11 DIAGNOSIS — L29.9 PRURITUS: ICD-10-CM

## 2017-10-11 PROCEDURE — 88305 TISSUE EXAM BY PATHOLOGIST: CPT | Performed by: PATHOLOGY

## 2017-10-11 PROCEDURE — 99999 PR PBB SHADOW E&M-EST. PATIENT-LVL III: CPT | Mod: PBBFAC,,, | Performed by: DERMATOLOGY

## 2017-10-11 PROCEDURE — 99203 OFFICE O/P NEW LOW 30 MIN: CPT | Mod: 25,S$GLB,, | Performed by: DERMATOLOGY

## 2017-10-11 PROCEDURE — 11100 PR BIOPSY OF SKIN LESION: CPT | Mod: S$GLB,,, | Performed by: DERMATOLOGY

## 2017-10-11 PROCEDURE — 88312 SPECIAL STAINS GROUP 1: CPT | Mod: 26,,, | Performed by: PATHOLOGY

## 2017-10-11 RX ORDER — PREDNISONE 10 MG/1
TABLET ORAL
Qty: 49 TABLET | Refills: 0 | Status: SHIPPED | OUTPATIENT
Start: 2017-10-11 | End: 2018-01-08

## 2017-10-11 RX ORDER — TRIAMCINOLONE ACETONIDE 1 MG/G
CREAM TOPICAL
Qty: 454 G | Refills: 1 | Status: SHIPPED | OUTPATIENT
Start: 2017-10-11 | End: 2018-04-25 | Stop reason: SDUPTHER

## 2017-10-11 NOTE — PROGRESS NOTES
Subjective:       Patient ID:  Reji Aleman Jr. is a 62 y.o. male who presents for   Chief Complaint   Patient presents with    Dry Skin     All over body     Dry Skin  - Initial  Affected locations: All over body.  Duration: 1 month  Signs / symptoms: itching and dryness  Severity: mild  Timing: constant  Aggravated by: nothing  Treatments tried: Ammonium and Triamciolone.  Improvement on treatment: no relief      Pt had a TIA in August, and he started taking plavix, crestor, and increased dose of daily aspirin on 8/4/17. On 9/17/17, he stopped the plavix.  His diffuse itchy rash started about a month ago.  He was doing some yard work prior.  He had some TAC on hand that was prescribed 2 1/2 yrs ago and tried a little of it with only minimal relief.  Pt states the rash is less itchy when he's in the sun.    Review of Systems   Constitutional: Negative for fever, chills and fatigue.   Skin: Positive for itching, rash and dry skin.   Hematologic/Lymphatic: Does not bruise/bleed easily.        Objective:    Physical Exam   Constitutional: He appears well-developed and well-nourished. No distress.   Neurological: He is alert and oriented to person, place, and time. He is not disoriented.   Psychiatric: He has a normal mood and affect.   Skin:   Areas Examined (abnormalities noted in diagram):   Scalp / Hair Palpated and Inspected  Head / Face Inspection Performed  Neck Inspection Performed  Chest / Axilla Inspection Performed  Abdomen Inspection Performed  Genitals / Buttocks / Groin Inspection Performed  Back Inspection Performed  RUE Inspected  LUE Inspection Performed  RLE Inspected  LLE Inspection Performed  Nails and Digits Inspection Performed              Diagram Legend     Erythematous scaling macule/papule c/w actinic keratosis       Vascular papule c/w angioma      Pigmented verrucoid papule/plaque c/w seborrheic keratosis      Yellow umbilicated papule c/w sebaceous hyperplasia      Irregularly shaped  tan macule c/w lentigo     1-2 mm smooth white papules consistent with Milia      Movable subcutaneous cyst with punctum c/w epidermal inclusion cyst      Subcutaneous movable cyst c/w pilar cyst      Firm pink to brown papule c/w dermatofibroma      Pedunculated fleshy papule(s) c/w skin tag(s)      Evenly pigmented macule c/w junctional nevus     Mildly variegated pigmented, slightly irregular-bordered macule c/w mildly atypical nevus      Flesh colored to evenly pigmented papule c/w intradermal nevus       Pink pearly papule/plaque c/w basal cell carcinoma      Erythematous hyperkeratotic cursted plaque c/w SCC      Surgical scar with no sign of skin cancer recurrence      Open and closed comedones      Inflammatory papules and pustules      Verrucoid papule consistent consistent with wart     Erythematous eczematous patches and plaques     Dystrophic onycholytic nail with subungual debris c/w onychomycosis     Umbilicated papule    Erythematous-base heme-crusted tan verrucoid plaque consistent with inflamed seborrheic keratosis     Erythematous Silvery Scaling Plaque c/w Psoriasis     See annotation      Assessment / Plan:      Disease of skin and subcutaneous tissue  Punch biopsy procedure note:  Punch biopsy performed after verbal consent obtained. Area marked and prepped with alcohol. Approximately 1cc of 1% lidocaine with epinephrine injected. 4 mm disposable punch used to remove lesion. Hemostasis obtained and biopsy site closed with 1 - 2 Prolene sutures. Wound care instructions reviewed with patient and handout given.    -     Tissue Specimen To Pathology, Dermatology  Pathology Orders:      Normal Orders This Visit    Tissue Specimen To Pathology, Dermatology     Questions:    Directional Terms:  Other(comment)    Clinical information:  r/o lichenoid drug vs. LP vs. eczema vs. parapsoriasis vs. other Comment - punch    Specific Site:  L neck          Pruritus  -     predniSONE (DELTASONE) 10 MG tablet;  Take 4 tabs po qam with breakfast x 1 week, then take 2 tabs po qam x 1 week, then take 1 tab po qam x 1 week.  Dispense: 49 tablet; Refill: 0  -     triamcinolone acetonide 0.1% (KENALOG) 0.1 % cream; AAA BID x 1-2 wks then prn flares only  Dispense: 454 g; Refill: 1      Return in about 2 weeks (around 10/25/2017).

## 2017-10-11 NOTE — PATIENT INSTRUCTIONS
Punch Biopsy Wound Care    Your doctor has performed a punch biopsy today.  A band aid and antibiotic ointment has been placed over the site.  This should remain in place for 24 hours.  It is recommended that you keep the area dry for the first 24 hours.  After 24 hours, you may remove the band aid and wash the area with warm soap and water and apply Vaseline jelly.  Many patients prefer to use Neosporin or Bacitracin ointment.  This is acceptable; however know that you can develop an allergy to this medication even if you have used it safely for years.  It is important to keep the area moist.  Letting it dry out and get air slows healing time, will worsen the scar, and make it more difficult to remove the stitches if they were placed.  Band aid is optional after first 24 hours.      If you notice increasing redness, tenderness, pain, or yellow drainage at the biopsy or surgical site, please notify your doctor.  These are signs of an infection.    If your biopsy/surgical site is bleeding, apply firm pressure for 15 minutes straight.  Repeat for another 15 minutes, if it is still bleeding.   If the surgical site continues to bleed, then please contact your doctor.      [address] [place] [address]/ [phone] FAX/ [email or web address]

## 2017-10-12 ENCOUNTER — TELEPHONE (OUTPATIENT)
Dept: ELECTROPHYSIOLOGY | Facility: CLINIC | Age: 62
End: 2017-10-12

## 2017-10-12 ENCOUNTER — INITIAL CONSULT (OUTPATIENT)
Dept: ELECTROPHYSIOLOGY | Facility: CLINIC | Age: 62
End: 2017-10-12
Payer: COMMERCIAL

## 2017-10-12 ENCOUNTER — HOSPITAL ENCOUNTER (OUTPATIENT)
Dept: CARDIOLOGY | Facility: CLINIC | Age: 62
Discharge: HOME | End: 2017-10-12
Payer: COMMERCIAL

## 2017-10-12 VITALS
BODY MASS INDEX: 27.22 KG/M2 | DIASTOLIC BLOOD PRESSURE: 82 MMHG | HEIGHT: 75 IN | HEART RATE: 66 BPM | SYSTOLIC BLOOD PRESSURE: 132 MMHG | WEIGHT: 218.94 LBS

## 2017-10-12 DIAGNOSIS — I48.0 PAROXYSMAL ATRIAL FIBRILLATION: ICD-10-CM

## 2017-10-12 DIAGNOSIS — I63.411 EMBOLIC STROKE INVOLVING RIGHT MIDDLE CEREBRAL ARTERY: Primary | ICD-10-CM

## 2017-10-12 DIAGNOSIS — I48.0 PAROXYSMAL ATRIAL FIBRILLATION: Primary | ICD-10-CM

## 2017-10-12 DIAGNOSIS — I48.0 PAF (PAROXYSMAL ATRIAL FIBRILLATION): ICD-10-CM

## 2017-10-12 PROCEDURE — 99245 OFF/OP CONSLTJ NEW/EST HI 55: CPT | Mod: S$GLB,,, | Performed by: INTERNAL MEDICINE

## 2017-10-12 PROCEDURE — 99999 PR PBB SHADOW E&M-EST. PATIENT-LVL III: CPT | Mod: PBBFAC,,, | Performed by: INTERNAL MEDICINE

## 2017-10-12 PROCEDURE — 93000 ELECTROCARDIOGRAM COMPLETE: CPT | Mod: S$GLB,,, | Performed by: INTERNAL MEDICINE

## 2017-10-12 NOTE — LETTER
October 12, 2017      Sahra Gardner MD  [address] [facility]  [address]  [place] [address]           [facility] - Arrhythmia  [address]  [facility] [address]  Phone: [phone]  Fax: [phone]          Patient: Reji Aleman Jr.   MR Number: [id number]   YOB: 1955   Date of Visit: 10/12/2017       Dear Dr. Sahra Gardner:    Thank you for referring Reji Aleman to me for evaluation. Attached you will find relevant portions of my assessment and plan of care.    If you have questions, please do not hesitate to call me. I look forward to following Reji Aleman along with you.    Sincerely,    Jose Angel Montes MD    Enclosure  CC:  No Recipients    If you would like to receive this communication electronically, please contact [email or web address] or [phone] to request more information on [employer] Link access.    For providers and/or their staff who would like to refer a patient to [facility], please contact us through our one-stop-shop provider referral line, [facility], at [phone].    If you feel you have received this communication in error or would no longer like to receive these types of communications, please e-mail [email or web address]

## 2017-10-12 NOTE — PROGRESS NOTES
Subjective:    Patient ID:  Reji Aleman Jr. is a 62 y.o. male who presents for evaluation of Atrial Fibrillation      62 yoM HTN, pAF, CVA here for AF management. He suffered a frontal lobe CVA 8/4/17 with newly diagnosed atrial fibrillation. He presented with aphasia and had resolution of symptoms without invasive therapy. He had normal LV function. He was started on rivaroxaban for CVA prophylaxis. He was started on atenolol for rate control. In retrospect he denies any preceding symptoms. He has had subsequent AF episodes on event monitor that have been mostly asymptomatic. He was in AF 10/9/17 during his echo without symptoms. He noticed no change in his overall state of health over the past few weeks to months. He has had symptomatic palpitations in the past but these have never been linked to AF. He has recovered from his CVA without residual deficit.     MRI brain:  Small focus of acute cortical infarct in the right frontal lobe with associated microhemorrhage.      Echo:    1 - Mild left atrial enlargement.     2 - Normal left ventricular systolic function (EF 60-65%).     3 - Impaired LV relaxation, elevated LAP (grade 2 diastolic dysfunction).     4 - Normal right ventricular systolic function .     5 - Mild mitral regurgitation.     6 - The estimated PA systolic pressure is 36 mmHg.     Past Medical History:  8/4/2017: Embolic stroke involving right middle cerebral*  No date: Hypertension    History reviewed. No pertinent surgical history.    Social History    Marital status: Single              Spouse name:                       Years of education:                 Number of children:               Occupational History    None on file    Social History Main Topics    Smoking status: Never Smoker                                                                Smokeless tobacco: Never Used                        Alcohol use: No              Drug use: No              Sexual activity: Not on file           Other Topics            Concern    None on file    Social History Narrative    None on file    Review of patient's family history indicates:    Melanoma                       Neg Hx                          Review of Systems   Constitution: Negative.   HENT: Negative.    Eyes: Negative.    Cardiovascular: Positive for irregular heartbeat and palpitations. Negative for chest pain, dyspnea on exertion, leg swelling, near-syncope and syncope.   Respiratory: Negative.  Negative for shortness of breath.    Endocrine: Negative.    Hematologic/Lymphatic: Negative.    Skin: Negative.    Musculoskeletal: Negative.    Gastrointestinal: Negative.    Genitourinary: Negative.    Neurological: Negative.  Negative for dizziness and light-headedness.   Psychiatric/Behavioral: Negative.    Allergic/Immunologic: Negative.         Objective:    Physical Exam   Constitutional: He is oriented to person, place, and time. He appears well-developed and well-nourished. No distress.   HENT:   Head: Normocephalic and atraumatic.   Eyes: EOM are normal. Pupils are equal, round, and reactive to light.   Neck: Normal range of motion. No JVD present. No thyromegaly present.   Cardiovascular: Normal rate, regular rhythm, S1 normal, S2 normal and normal heart sounds.  PMI is not displaced.  Exam reveals no gallop and no friction rub.    No murmur heard.  Pulmonary/Chest: Effort normal and breath sounds normal. No respiratory distress. He has no wheezes. He has no rales.   Abdominal: Soft. Bowel sounds are normal. He exhibits no distension. There is no tenderness. There is no rebound and no guarding.   Musculoskeletal: Normal range of motion. He exhibits no edema or tenderness.   Neurological: He is alert and oriented to person, place, and time. No cranial nerve deficit.   Skin: Skin is warm and dry. No rash noted. No erythema.   Psychiatric: He has a normal mood and affect. His behavior is normal. Judgment and thought content normal.   Vitals  reviewed.    ECg: NSR nl FL, QRS, QTc        Assessment:       1. Embolic stroke involving right middle cerebral artery    2. Paroxysmal atrial fibrillation         Plan:       62 yoM HTN, pAF, CVA here for AF management. I discussed AF and its basic pathophysiology, including its health implications and treatment options with the patient. Specifically, I addressed the need for CVA prophylaxis as well as the goal to reduce symptomatic arrhythmic episodes by pharmacologic and/or procedural methods. He is on appropriate CVA prophylaxis with xarelto 20 mg qd. He has vague symptoms associated with AF with remote palpitations but no active symptoms. I discussed medical therapy versus PVI with the patient. With new AF guidelines, there is a IIb recommendation for ablation of pAF in select asymptomatic patients. He has had palpitations in the past which may have been AF. Given his presentation with CVA the patient wishes to minimize his AF burden as much as possible. I offered medication versus PVI. He opted for PVI. Will implant ILR post ablation for long term AF monitoring.  I had extensive discussion with patient regarding risks and benefits of PVI/WACA, and he would like to proceed. Risks of procedure include (but are not limited to) bleeding, stroke, perforation requiring emergency draining or surgery, AV block, death, AV fistula, AE fistula, PV stenosis.    Continue anti-coagulation until day prior to procedure.  Cry[place]alloon PVI  CT/MRI prior  Anesthesia  CORBIN      ILR to follow

## 2017-10-25 ENCOUNTER — OFFICE VISIT (OUTPATIENT)
Dept: DERMATOLOGY | Facility: CLINIC | Age: 62
End: 2017-10-25
Payer: COMMERCIAL

## 2017-10-25 DIAGNOSIS — L98.9 PSORIASIS-LIKE SKIN DISEASE: Primary | ICD-10-CM

## 2017-10-25 PROCEDURE — 99212 OFFICE O/P EST SF 10 MIN: CPT | Mod: S$GLB,,, | Performed by: DERMATOLOGY

## 2017-10-25 PROCEDURE — 99999 PR PBB SHADOW E&M-EST. PATIENT-LVL II: CPT | Mod: PBBFAC,,, | Performed by: DERMATOLOGY

## 2017-10-25 NOTE — PROGRESS NOTES
Subjective:       Patient ID:  Reji Aleman Jr. is a 62 y.o. male who presents for   Chief Complaint   Patient presents with    Results     HPI  Pt is here today for suture removal and biopsy results.  States the rash has cleared up and he is no longer itchy, but he still has some dry skin. Is on the last day of 20mg prednisone, and goes down to 10mg daily x 1 wk starting tomorrow.    Previous history:  Pt had a TIA in August, and he started taking plavix, crestor, and increased dose of daily aspirin on 8/4/17. On 9/17/17, he stopped the plavix.  His diffuse itchy rash started around the beginning of September.  He was doing some yard work prior.  Pt states the rash is less itchy when he's in the sun.  Still takes crestor, but no longer taking plavix.  States his father had psoriasis.    Review of Systems   Constitutional: Negative for fever, chills and malaise.   Skin: Positive for dry skin. Negative for itching and rash.        Objective:    Physical Exam   Constitutional: He appears well-developed and well-nourished. No distress.   Neurological: He is alert and oriented to person, place, and time. He is not disoriented.   Psychiatric: He has a normal mood and affect.   Skin:   Areas Examined (abnormalities noted in diagram):   Head / Face Inspection Performed  Neck Inspection Performed  RUE Inspected  LUE Inspection Performed              Diagram Legend     Erythematous scaling macule/papule c/w actinic keratosis       Vascular papule c/w angioma      Pigmented verrucoid papule/plaque c/w seborrheic keratosis      Yellow umbilicated papule c/w sebaceous hyperplasia      Irregularly shaped tan macule c/w lentigo     1-2 mm smooth white papules consistent with Milia      Movable subcutaneous cyst with punctum c/w epidermal inclusion cyst      Subcutaneous movable cyst c/w pilar cyst      Firm pink to brown papule c/w dermatofibroma      Pedunculated fleshy papule(s) c/w skin tag(s)      Evenly pigmented macule  c/w junctional nevus     Mildly variegated pigmented, slightly irregular-bordered macule c/w mildly atypical nevus      Flesh colored to evenly pigmented papule c/w intradermal nevus       Pink pearly papule/plaque c/w basal cell carcinoma      Erythematous hyperkeratotic cursted plaque c/w SCC      Surgical scar with no sign of skin cancer recurrence      Open and closed comedones      Inflammatory papules and pustules      Verrucoid papule consistent consistent with wart     Erythematous eczematous patches and plaques     Dystrophic onycholytic nail with subungual debris c/w onychomycosis     Umbilicated papule    Erythematous-base heme-crusted tan verrucoid plaque consistent with inflamed seborrheic keratosis     Erythematous Silvery Scaling Plaque c/w Psoriasis     See annotation      Assessment / Plan:      FINAL PATHOLOGIC DIAGNOSIS  1. Skin, left neck, punch biopsy:  - PSORIASIFORM SPONGIOTIC DERMATITIS (See Comment).  MICROSCOPIC DESCRIPTION: The biopsy shows parakeratosis focally associated with neutrophils. The viable  epidermis exhibits hypogranulosis and acanthosis with regular elongation of the rete ridges. There is mild  epidermal spongiosis with exocytosis of lymphocytes and rarer neutrophils. The epidermal suprapapillary plates  appear thin and there are tortuous vessels in the dermal papillae. Within the dermis there is a sparse superficial  perivascular lymphohistiocytic infiltrate consisting of lymphocytes, histiocytes, a few neutrophils, and rare  eosinophils. PAS stain is negative for fungi. Appropriately reactive controls were reviewed. Multiple levels were  examined.  COMMENT: Overall, I favor a diagnosis of psoriasis. A psoriasiform drug eruption cannot be excluded, given the  presence of some degree of epidermal spongiosis and a few scattered eosinophils within the superficial dermis.  Diagnosed by: Silvano Warren M.D.    Psoriasis-like skin disease  Counseled pt that his rash may have  "been medication-induced, in which case it may continue to flare even up to about 3 months after discontinuing the offending medication.  It is also possible that he may have psoriasis, which is a chronic skin disease.  Warned pt that either way, the rash may flare again after he discontinues prednisone.  He will let me know if this happens, and will continue the TAC cream prn flares.  Will consider changing crestor if rash does continue to flare.    Good skin care regimen discussed including limiting to one bath or shower/day, using lukewarm water with mild soap and moisturizing cream to skin 1 - 2x/day. Brochure was provided and reviewed with patient.  Recommended CeraVe moisturizing cream, Dove sensitive skin soap, and "free and clear" detergents.    rtc prn  "

## 2017-11-01 ENCOUNTER — HOSPITAL ENCOUNTER (OUTPATIENT)
Dept: RADIOLOGY | Facility: HOSPITAL | Age: 62
Discharge: HOME | End: 2017-11-01
Attending: INTERNAL MEDICINE
Payer: COMMERCIAL

## 2017-11-01 DIAGNOSIS — I48.0 PAROXYSMAL ATRIAL FIBRILLATION: ICD-10-CM

## 2017-11-01 LAB
CREAT SERPL-MCNC: 1.1 MG/DL (ref 0.5–1.4)
SAMPLE: NORMAL

## 2017-11-01 PROCEDURE — 75572 CT HRT W/3D IMAGE: CPT | Mod: TC

## 2017-11-01 PROCEDURE — 75572 CT HRT W/3D IMAGE: CPT | Mod: 26,,, | Performed by: RADIOLOGY

## 2017-11-01 PROCEDURE — 25500020 PHARM REV CODE 255: Performed by: INTERNAL MEDICINE

## 2017-11-01 RX ADMIN — IOHEXOL 100 ML: 350 INJECTION, SOLUTION INTRAVENOUS at 07:11

## 2017-11-29 DIAGNOSIS — I48.0 PAROXYSMAL A-FIB: ICD-10-CM

## 2017-11-29 RX ORDER — ROSUVASTATIN CALCIUM 40 MG/1
40 TABLET, COATED ORAL NIGHTLY
Qty: 90 TABLET | Refills: 3 | Status: SHIPPED | OUTPATIENT
Start: 2017-11-29 | End: 2018-12-25 | Stop reason: SDUPTHER

## 2017-11-29 NOTE — TELEPHONE ENCOUNTER
----- Message from Deedee Wing sent at 11/29/2017  2:39 PM CST -----  Contact: [facility]  [facility] called to get the pt's rivaroxaban (XARELTO) 20 mg Tab, and rosuvastatin (CRESTOR) 40 MG Tab filled for a 90 day refill sent to the [facility] - [place], [place] - [address]  They can be reached @ [phone].  Pt of Dr. Gardner LOV 9/26/17.  Thanks!!

## 2018-01-08 ENCOUNTER — HOSPITAL ENCOUNTER (OUTPATIENT)
Dept: CARDIOLOGY | Facility: CLINIC | Age: 63
Discharge: HOME | End: 2018-01-08
Payer: COMMERCIAL

## 2018-01-08 ENCOUNTER — OFFICE VISIT (OUTPATIENT)
Dept: CARDIOLOGY | Facility: CLINIC | Age: 63
End: 2018-01-08
Payer: COMMERCIAL

## 2018-01-08 VITALS
DIASTOLIC BLOOD PRESSURE: 76 MMHG | SYSTOLIC BLOOD PRESSURE: 121 MMHG | WEIGHT: 224.44 LBS | HEART RATE: 77 BPM | BODY MASS INDEX: 27.9 KG/M2 | HEIGHT: 75 IN

## 2018-01-08 DIAGNOSIS — I48.0 PAF (PAROXYSMAL ATRIAL FIBRILLATION): Primary | ICD-10-CM

## 2018-01-08 DIAGNOSIS — I10 ESSENTIAL HYPERTENSION: ICD-10-CM

## 2018-01-08 DIAGNOSIS — I63.411 EMBOLIC STROKE INVOLVING RIGHT MIDDLE CEREBRAL ARTERY: ICD-10-CM

## 2018-01-08 PROCEDURE — 93000 ELECTROCARDIOGRAM COMPLETE: CPT | Mod: S$GLB,,, | Performed by: INTERNAL MEDICINE

## 2018-01-08 PROCEDURE — 99999 PR PBB SHADOW E&M-EST. PATIENT-LVL III: CPT | Mod: PBBFAC,,, | Performed by: INTERNAL MEDICINE

## 2018-01-08 PROCEDURE — 99214 OFFICE O/P EST MOD 30 MIN: CPT | Mod: S$GLB,,, | Performed by: INTERNAL MEDICINE

## 2018-01-08 NOTE — PROGRESS NOTES
Subjective:   Patient ID:  Reji Aleman Jr. is a 62 y.o. male who presents for follow-up of Embolic stroke involving right middle cerebral artery (8 weeks fu)  Reji Aleman Jr. is a 62 y.o. male is a new patient who presents for evaluation of Atrial Fibrillation  Dx: R frontal infarct, cardioembolic from newly dx afib   Stroke risk factors: HLD, afib   CVA sx resolved.   Asked to be taken off ASA and Plavix and started on Xeralto.       MRI brain:  Small focus of acute cortical infarct in the right frontal lobe with associated microhemorrhage.     HPI:   Non smoker  No family history of heart disease. Sister had Mitral valve disease she had Mitral valve repair.   Does not measure BP at home.   Occasional exercise.      Echo:    1 - Mild left atrial enlargement.     2 - Normal left ventricular systolic function (EF 60-65%).     3 - Impaired LV relaxation, elevated LAP (grade 2 diastolic dysfunction).     4 - Normal right ventricular systolic function .     5 - Mild mitral regurgitation.     6 - The estimated PA systolic pressure is 36 mmHg.         Event Monitor:  EVENT DETAILS:  The patient's presenting rhythm was sinus rhythm with a rate range of 74-78 beats per minute.  There were three other patient triggered events.  One of them was labeled as routine testing, one was labeled a followup test and one did not   have any symptoms entered.  The initial one without symptoms entered occurred on August 16 at 12:40 a.m. and the activation was consistent with atrial fibrillation with a ventricular rate range of 75-97 beats per minute.  A followup test 17 minutes later   noted continued atrial fibrillation at a ventricular rate range of 67-97 beats per minute.  Another followup test at 09:32 that morning showed sinus rhythm with PACs with a rate range of 72-74 beats per minute.  There were no other triggered events.    SUMMARY:  In summary, this event monitor is consistent with paroxysmal atrial fibrillation without  "rapid ventricular conduction.  Clinical correlation is advised.     HPI:   Patient was seen by Dr. Montes and plan was do a PVI.   Does not exercise.      Patient Active Problem List   Diagnosis    Embolic stroke involving right middle cerebral artery    Rapid heart beat    Cavernoma    Aphasia    Expressive aphasia    Fibromuscular dysplasia    Cytotoxic cerebral edema    Paroxysmal atrial fibrillation     /76 (BP Location: Left arm, Patient Position: Sitting, BP Method: X-Large (Automatic))   Pulse 77   Ht 6' 3" (1.905 m)   Wt 101.8 kg (224 lb 6.9 oz)   BMI 28.05 kg/m²   Body mass index is 28.05 kg/m².  CrCl cannot be calculated (Patient's most recent lab result is older than the maximum 7 days allowed.).    Lab Results   Component Value Date     08/05/2017    K 3.7 08/05/2017     08/05/2017    CO2 25 08/05/2017    BUN 12 08/05/2017    CREATININE 1.2 08/05/2017    GLU 84 08/05/2017    MG 1.9 08/05/2017    AST 17 08/05/2017    ALT 15 08/05/2017    ALBUMIN 3.4 (L) 08/05/2017    PROT 6.2 08/05/2017    BILITOT 0.7 08/05/2017    WBC 4.60 08/05/2017    HGB 14.0 08/05/2017    HCT 38.2 (L) 08/05/2017    MCV 78 (L) 08/05/2017     08/05/2017    INR 0.9 08/05/2017    TSH 2.278 08/04/2017    CHOL 202 (H) 08/04/2017    HDL 50 08/04/2017    LDLCALC 89.0 08/04/2017    TRIG 315 (H) 08/04/2017       Current Outpatient Prescriptions   Medication Sig    amlodipine (NORVASC) 10 MG tablet Take 0.5 tab daily    atenolol (TENORMIN) 50 MG tablet Take 50 mg by mouth once daily.    naproxen (NAPROSYN) 500 MG tablet TAKE 1 TABLET BYMOUTH TWICE A DAY WITH FOOD    rivaroxaban (XARELTO) 20 mg Tab Take 1 tablet (20 mg total) by mouth daily with dinner or evening meal.    rosuvastatin (CRESTOR) 40 MG Tab Take 1 tablet (40 mg total) by mouth every evening.    triamcinolone acetonide 0.1% (KENALOG) 0.1 % cream AAA BID x 1-2 wks then prn flares only     No current facility-administered medications for " this visit.        Review of Systems   Constitution: Negative.   HENT: Negative.    Eyes: Negative.    Cardiovascular: Positive for irregular heartbeat and palpitations. Negative for chest pain, dyspnea on exertion, leg swelling, near-syncope and syncope.   Respiratory: Negative.  Negative for shortness of breath.    Endocrine: Negative.    Hematologic/Lymphatic: Negative.    Skin: Negative.    Musculoskeletal: Negative.    Gastrointestinal: Negative.    Genitourinary: Negative.    Neurological: Negative.  Negative for dizziness and light-headedness.   Psychiatric/Behavioral: Negative.    Allergic/Immunologic: Negative.        Objective:   Physical Exam   Constitutional: He is oriented to person, place, and time. He appears well-developed and well-nourished. No distress.   HENT:   Head: Normocephalic and atraumatic.   Eyes: EOM are normal. Pupils are equal, round, and reactive to light.   Neck: Normal range of motion. No JVD present. No thyromegaly present.   Cardiovascular: Normal rate, regular rhythm, S1 normal, S2 normal and normal heart sounds.  PMI is not displaced.  Exam reveals no gallop and no friction rub.    No murmur heard.  Pulmonary/Chest: Effort normal and breath sounds normal. No respiratory distress. He has no wheezes. He has no rales.   Abdominal: Soft. Bowel sounds are normal. He exhibits no distension. There is no tenderness. There is no rebound and no guarding.   Musculoskeletal: Normal range of motion. He exhibits no edema or tenderness.   Neurological: He is alert and oriented to person, place, and time. No cranial nerve deficit.   Skin: Skin is warm and dry. No rash noted. No erythema.   Psychiatric: He has a normal mood and affect. His behavior is normal. Judgment and thought content normal.   Vitals reviewed.      Assessment:     1. PAF (paroxysmal atrial fibrillation)    2. Essential hypertension    3. Embolic stroke involving right middle cerebral artery        Plan:   Reji was seen  today for embolic stroke involving right middle cerebral artery.    Diagnoses and all orders for this visit:    PAF (paroxysmal atrial fibrillation)  -     IN OFFICE EKG 12-LEAD (to Muse)    Essential hypertension    Embolic stroke involving right middle cerebral artery      Continue to to monitor BP and no change in meds. Will resend the patient to Dr. Montes for PVI. I reiterated the procedure to him and he wants to get this done.    Counseled on importance of heart healthy diet low in saturated and trans fat and salt as well gradually starting a regular aerobic exercise regimen with goal of 30min 5x/week. Recommend BP diary. Call if systolic BP > 140 mmHg on checking repeatedly  All meds reviewed and are appropriate  Patient is compliant with his medications.

## 2018-01-10 ENCOUNTER — TELEPHONE (OUTPATIENT)
Dept: CARDIOLOGY | Facility: CLINIC | Age: 63
End: 2018-01-10

## 2018-03-19 ENCOUNTER — TELEPHONE (OUTPATIENT)
Dept: ELECTROPHYSIOLOGY | Facility: CLINIC | Age: 63
End: 2018-03-19

## 2018-03-19 ENCOUNTER — PATIENT MESSAGE (OUTPATIENT)
Dept: ELECTROPHYSIOLOGY | Facility: CLINIC | Age: 63
End: 2018-03-19

## 2018-03-19 NOTE — TELEPHONE ENCOUNTER
ABLATION/LOOP RECORDER IMPLANT EDUCATION CHECKLIST    4/3/18  PRE - PROCEDURE LABS HAVE BEEN ORDERED FOR YOU @ [facility]  BE SURE TO ARRIVE AT YOUR SCHEDULED TIME FOR THIS LAB WORK!  (YOU DO NOT HAVE TO FAST FOR THIS LABWORK!!!!)    4/6/18 @ 5:30 AM  Report to Cardiology Waiting Room on 3rd floor of the Hospital    (Do not report to clinic)  Directions for Reporting to Cardiology Waiting Area in the Hospital  If you park in the Parking Garage:  Take elevators to the 2nd floor  Walk up ramp and turn right by Gold Elevators  Take elevator to the 3rd floor  Upon exiting the elevator, turn away from the clinic areas  Walk long nur around to front of hospital to area with windows overlooking [facility]  Check in at Reception Desk  OR  If family is dropping you off:  Have them drop you off at the front of the Hospital  (Near the ER, where all the flags are hung).  Take the E elevators to the 3rd floor.  Check in at the Reception Desk in the waiting room.    Do not eat or drink anything after: 12 mn on the night before your procedure    Wash with Hibiclens or an antibacterial soap (such as Dial) on the night before and the morning of your procedure prior to arrival    Medications:   Hold your blood thinner: Xarelto 1 day before your procedure (LAST DOSE: 4/4/18)  You may take your other usual morning medications with a sip of water    You will be spending the night after your procedure  You will need someone to drive you home the day after your procedure.    Your pain during your procedure will be managed by the anesthesia team.     THE ABOVE INSTRUCTIONS WERE GIVEN TO THE PATIENT VERBALLY AND THEY VERBALIZED UNDERSTANDING.  THEY DO NOT REQUIRE ANY SPECIAL NEEDS AND DO NOT HAVE ANY LEARNING BARRIERS.    Any need to reschedule or cancel procedures, or any questions regarding your procedures should be addressed directly with the Arrhythmia Department Nurses at the following phone number:  [phone]

## 2018-03-19 NOTE — TELEPHONE ENCOUNTER
Post-Procedure Patient Discharge Instructions  Loop Recorders    Wound Care   If you are discharged with a standard dressing over the incision, you may remove the dressing after 24 hours.    If there are Steri-strips (strips of tape) over your incision, leave them on until your follow-up appointment. They may begin to fall off on their own, which is normal. If there is Dermabond (clear glue) over your incision, do not scrub it off. It acts as a barrier and will eventually disappear.   You will be discharged with 5 days of oral antibiotics. Please take the full prescription until it is gone.   Do not get the incision wet for 48 hours following the procedure. You may sponge bath during this period, working around the incision. After 48 hours, you may shower, but you should still try to keep this area as dry as possible, and avoid direct water contact to the incision (allow the water to hit back of your shoulder rather than directly on the incision). Gently pat the incision dry if it does get wet.   You may take regular showers after 2 weeks, unless otherwise indicated at your follow-up visit.   Do not submerge the incision in a tub, pool, or body of water for 6 weeks.   If you notice unusual swelling, redness, drainage, have more device site pain, chest pain, shortness of breath, or have a fever greater than 100 degrees, call our device clinic immediately: [phone] or [phone] during normal office hours. You may call [phone] after-hours or on weekends and ask for the electrophysiologist on call.  Activity    If you were driving prior to the procedure, you may resume driving right after your procedure (as long you did not receive any sedation). If you have a history of passing out or a history of certain arrhythmias, there may be driving restrictions unrelated to the procedure. Please clarify with your physician if this is the case.   Avoid rough contact at the device site for 6  weeks.   You may participate in sexual activity unless otherwise instructed.   You may return to work the follow day unless told otherwise by your provider.  Long-Term Instructions   Metal Detectors at Airports: Metal detectors at airports do not interfere with you loop recorder.   MRI: You may have an MRI with an implantable loop recorder. All that is required is that you send a transmission to download your information before and after you have your MRI.  Long-Term Follow-Up   Your device has the ability to transmit device information from home to the doctors office using a home monitoring system.   This remote system takes the place of a doctors visit. Your device will be checked from home every 31 days. Every 12 months, you will be asked to come into the office for an in-office check.   Your device should last in the range of 3 years.

## 2018-04-03 ENCOUNTER — TELEPHONE (OUTPATIENT)
Dept: ELECTROPHYSIOLOGY | Facility: CLINIC | Age: 63
End: 2018-04-03

## 2018-04-03 ENCOUNTER — LAB VISIT (OUTPATIENT)
Dept: LAB | Facility: HOSPITAL | Age: 63
End: 2018-04-03
Payer: COMMERCIAL

## 2018-04-03 DIAGNOSIS — I48.0 PAROXYSMAL ATRIAL FIBRILLATION: Primary | ICD-10-CM

## 2018-04-03 DIAGNOSIS — I48.0 PAROXYSMAL ATRIAL FIBRILLATION: ICD-10-CM

## 2018-04-03 LAB
ANION GAP SERPL CALC-SCNC: 5 MMOL/L
APTT BLDCRRT: 28 SEC
BASOPHILS # BLD AUTO: 0.06 K/UL
BASOPHILS NFR BLD: 1.3 %
BUN SERPL-MCNC: 13 MG/DL
CALCIUM SERPL-MCNC: 9.1 MG/DL
CHLORIDE SERPL-SCNC: 105 MMOL/L
CO2 SERPL-SCNC: 28 MMOL/L
CREAT SERPL-MCNC: 1.2 MG/DL
DIFFERENTIAL METHOD: ABNORMAL
EOSINOPHIL # BLD AUTO: 0.2 K/UL
EOSINOPHIL NFR BLD: 5.3 %
ERYTHROCYTE [DISTWIDTH] IN BLOOD BY AUTOMATED COUNT: 13.2 %
EST. GFR  (AFRICAN AMERICAN): >60 ML/MIN/1.73 M^2
EST. GFR  (NON AFRICAN AMERICAN): >60 ML/MIN/1.73 M^2
GLUCOSE SERPL-MCNC: 92 MG/DL
HCT VFR BLD AUTO: 42 %
HGB BLD-MCNC: 14.7 G/DL
IMM GRANULOCYTES # BLD AUTO: 0 K/UL
IMM GRANULOCYTES NFR BLD AUTO: 0 %
INR PPP: 1.1
LYMPHOCYTES # BLD AUTO: 1.6 K/UL
LYMPHOCYTES NFR BLD: 35.4 %
MCH RBC QN AUTO: 27.9 PG
MCHC RBC AUTO-ENTMCNC: 35 G/DL
MCV RBC AUTO: 80 FL
MONOCYTES # BLD AUTO: 0.4 K/UL
MONOCYTES NFR BLD: 9.1 %
NEUTROPHILS # BLD AUTO: 2.2 K/UL
NEUTROPHILS NFR BLD: 48.9 %
NRBC BLD-RTO: 0 /100 WBC
PLATELET # BLD AUTO: 194 K/UL
PMV BLD AUTO: 11.8 FL
POTASSIUM SERPL-SCNC: 4.6 MMOL/L
PROTHROMBIN TIME: 11.2 SEC
RBC # BLD AUTO: 5.27 M/UL
SODIUM SERPL-SCNC: 138 MMOL/L
WBC # BLD AUTO: 4.52 K/UL

## 2018-04-03 PROCEDURE — 85610 PROTHROMBIN TIME: CPT

## 2018-04-03 PROCEDURE — 85025 COMPLETE CBC W/AUTO DIFF WBC: CPT

## 2018-04-03 PROCEDURE — 80048 BASIC METABOLIC PNL TOTAL CA: CPT

## 2018-04-03 PROCEDURE — 85730 THROMBOPLASTIN TIME PARTIAL: CPT

## 2018-04-03 PROCEDURE — 36415 COLL VENOUS BLD VENIPUNCTURE: CPT

## 2018-04-06 ENCOUNTER — ANESTHESIA (OUTPATIENT)
Dept: MEDSURG UNIT | Facility: HOSPITAL | Age: 63
End: 2018-04-06

## 2018-04-06 ENCOUNTER — ANESTHESIA EVENT (OUTPATIENT)
Dept: MEDSURG UNIT | Facility: HOSPITAL | Age: 63
End: 2018-04-06
Payer: COMMERCIAL

## 2018-04-06 ENCOUNTER — ANESTHESIA (OUTPATIENT)
Dept: MEDSURG UNIT | Facility: HOSPITAL | Age: 63
End: 2018-04-06
Payer: COMMERCIAL

## 2018-04-06 ENCOUNTER — SURGERY (OUTPATIENT)
Age: 63
End: 2018-04-06

## 2018-04-06 ENCOUNTER — ANESTHESIA EVENT (OUTPATIENT)
Dept: MEDSURG UNIT | Facility: HOSPITAL | Age: 63
End: 2018-04-06

## 2018-04-06 ENCOUNTER — RESEARCH ENCOUNTER (OUTPATIENT)
Dept: ELECTROPHYSIOLOGY | Facility: CLINIC | Age: 63
End: 2018-04-06

## 2018-04-06 ENCOUNTER — HOSPITAL ENCOUNTER (OUTPATIENT)
Dept: CARDIOLOGY | Facility: CLINIC | Age: 63
Discharge: HOME | End: 2018-04-06
Attending: INTERNAL MEDICINE | Admitting: INTERNAL MEDICINE
Payer: COMMERCIAL

## 2018-04-06 ENCOUNTER — HOSPITAL ENCOUNTER (OUTPATIENT)
Facility: HOSPITAL | Age: 63
Discharge: HOME | End: 2018-04-07
Attending: INTERNAL MEDICINE | Admitting: INTERNAL MEDICINE
Payer: COMMERCIAL

## 2018-04-06 DIAGNOSIS — I48.0 PAROXYSMAL ATRIAL FIBRILLATION: Primary | ICD-10-CM

## 2018-04-06 DIAGNOSIS — I48.91 ATRIAL FIBRILLATION: ICD-10-CM

## 2018-04-06 DIAGNOSIS — I63.411 CEREBRAL INFARCTION DUE TO EMBOLISM OF RIGHT MIDDLE CEREBRAL ARTERY: ICD-10-CM

## 2018-04-06 LAB
ABO + RH BLD: NORMAL
BLD GP AB SCN CELLS X3 SERPL QL: NORMAL
GLOBAL PERICARDIAL EFFUSION: NORMAL
MITRAL VALVE MOBILITY: NORMAL
MITRAL VALVE REGURGITATION: NORMAL
POC ACTIVATED CLOTTING TIME K: 268 SEC (ref 74–137)
POC ACTIVATED CLOTTING TIME K: 301 SEC (ref 74–137)
POC ACTIVATED CLOTTING TIME K: 307 SEC (ref 74–137)
POC ACTIVATED CLOTTING TIME K: 329 SEC (ref 74–137)
POC ACTIVATED CLOTTING TIME K: 329 SEC (ref 74–137)
SAMPLE: ABNORMAL
TRICUSPID VALVE REGURGITATION: NORMAL

## 2018-04-06 PROCEDURE — 25000003 PHARM REV CODE 250: Performed by: NURSE ANESTHETIST, CERTIFIED REGISTERED

## 2018-04-06 PROCEDURE — 25000003 PHARM REV CODE 250: Performed by: NURSE PRACTITIONER

## 2018-04-06 PROCEDURE — C1893 INTRO/SHEATH, FIXED,NON-PEEL: HCPCS

## 2018-04-06 PROCEDURE — 63600175 PHARM REV CODE 636 W HCPCS: Performed by: NURSE ANESTHETIST, CERTIFIED REGISTERED

## 2018-04-06 PROCEDURE — 86850 RBC ANTIBODY SCREEN: CPT

## 2018-04-06 PROCEDURE — 93662 INTRACARDIAC ECG (ICE): CPT

## 2018-04-06 PROCEDURE — 37000008 HC ANESTHESIA 1ST 15 MINUTES: Performed by: INTERNAL MEDICINE

## 2018-04-06 PROCEDURE — 37000009 HC ANESTHESIA EA ADD 15 MINS: Performed by: INTERNAL MEDICINE

## 2018-04-06 PROCEDURE — D9220A PRA ANESTHESIA: Mod: ANES,,, | Performed by: ANESTHESIOLOGY

## 2018-04-06 PROCEDURE — 63600175 PHARM REV CODE 636 W HCPCS

## 2018-04-06 PROCEDURE — 25500020 PHARM REV CODE 255

## 2018-04-06 PROCEDURE — 93662 INTRACARDIAC ECG (ICE): CPT | Mod: 26,,, | Performed by: INTERNAL MEDICINE

## 2018-04-06 PROCEDURE — 27200198 EP LAB PROCEDURE

## 2018-04-06 PROCEDURE — 27201037 HC PRESSURE MONITORING SET UP

## 2018-04-06 PROCEDURE — 25000003 PHARM REV CODE 250: Performed by: INTERNAL MEDICINE

## 2018-04-06 PROCEDURE — 93355 ECHO TRANSESOPHAGEAL (TEE): CPT

## 2018-04-06 PROCEDURE — 93005 ELECTROCARDIOGRAM TRACING: CPT

## 2018-04-06 PROCEDURE — D9220A PRA ANESTHESIA: Mod: CRNA,,, | Performed by: NURSE ANESTHETIST, CERTIFIED REGISTERED

## 2018-04-06 PROCEDURE — 36620 INSERTION CATHETER ARTERY: CPT | Mod: 59,,, | Performed by: ANESTHESIOLOGY

## 2018-04-06 PROCEDURE — 33282 EP LAB PROCEDURE: CPT | Mod: 58,,, | Performed by: INTERNAL MEDICINE

## 2018-04-06 PROCEDURE — 93656 COMPRE EP EVAL ABLTJ ATR FIB: CPT | Mod: ,,, | Performed by: INTERNAL MEDICINE

## 2018-04-06 PROCEDURE — 93010 ELECTROCARDIOGRAM REPORT: CPT | Mod: 76,,, | Performed by: INTERNAL MEDICINE

## 2018-04-06 PROCEDURE — 63600175 PHARM REV CODE 636 W HCPCS: Performed by: NURSE PRACTITIONER

## 2018-04-06 PROCEDURE — 93613 INTRACARDIAC EPHYS 3D MAPG: CPT | Mod: ,,, | Performed by: INTERNAL MEDICINE

## 2018-04-06 PROCEDURE — 93355 ECHO TRANSESOPHAGEAL (TEE): CPT | Mod: ,,, | Performed by: INTERNAL MEDICINE

## 2018-04-06 PROCEDURE — 25000003 PHARM REV CODE 250

## 2018-04-06 PROCEDURE — 93010 ELECTROCARDIOGRAM REPORT: CPT | Mod: ,,, | Performed by: INTERNAL MEDICINE

## 2018-04-06 RX ORDER — SODIUM CHLORIDE 9 MG/ML
INJECTION, SOLUTION INTRAVENOUS CONTINUOUS
Status: DISCONTINUED | OUTPATIENT
Start: 2018-04-06 | End: 2018-04-07 | Stop reason: HOSPADM

## 2018-04-06 RX ORDER — LIDOCAINE HCL/PF 100 MG/5ML
SYRINGE (ML) INTRAVENOUS
Status: DISCONTINUED | OUTPATIENT
Start: 2018-04-06 | End: 2018-04-06

## 2018-04-06 RX ORDER — ACETAMINOPHEN 325 MG/1
650 TABLET ORAL EVERY 6 HOURS PRN
Status: DISCONTINUED | OUTPATIENT
Start: 2018-04-06 | End: 2018-04-07 | Stop reason: HOSPADM

## 2018-04-06 RX ORDER — ONDANSETRON 2 MG/ML
INJECTION INTRAMUSCULAR; INTRAVENOUS
Status: DISCONTINUED | OUTPATIENT
Start: 2018-04-06 | End: 2018-04-06

## 2018-04-06 RX ORDER — HYDROMORPHONE HYDROCHLORIDE 1 MG/ML
0.2 INJECTION, SOLUTION INTRAMUSCULAR; INTRAVENOUS; SUBCUTANEOUS EVERY 5 MIN PRN
Status: DISCONTINUED | OUTPATIENT
Start: 2018-04-06 | End: 2018-04-07 | Stop reason: HOSPADM

## 2018-04-06 RX ORDER — PANTOPRAZOLE SODIUM 40 MG/1
40 TABLET, DELAYED RELEASE ORAL DAILY
Qty: 30 TABLET | Refills: 0 | Status: SHIPPED | OUTPATIENT
Start: 2018-04-06 | End: 2018-06-05

## 2018-04-06 RX ORDER — AMLODIPINE BESYLATE 5 MG/1
5 TABLET ORAL NIGHTLY
Status: DISCONTINUED | OUTPATIENT
Start: 2018-04-06 | End: 2018-04-07 | Stop reason: HOSPADM

## 2018-04-06 RX ORDER — PANTOPRAZOLE SODIUM 40 MG/1
40 TABLET, DELAYED RELEASE ORAL DAILY
Status: DISCONTINUED | OUTPATIENT
Start: 2018-04-06 | End: 2018-04-07 | Stop reason: HOSPADM

## 2018-04-06 RX ORDER — ROSUVASTATIN CALCIUM 20 MG/1
40 TABLET, COATED ORAL NIGHTLY
Status: DISCONTINUED | OUTPATIENT
Start: 2018-04-06 | End: 2018-04-07 | Stop reason: HOSPADM

## 2018-04-06 RX ORDER — ROSUVASTATIN CALCIUM 20 MG/1
40 TABLET, COATED ORAL NIGHTLY
Status: DISCONTINUED | OUTPATIENT
Start: 2018-04-06 | End: 2018-04-06

## 2018-04-06 RX ORDER — PHENYLEPHRINE HYDROCHLORIDE 10 MG/ML
INJECTION INTRAVENOUS
Status: DISCONTINUED | OUTPATIENT
Start: 2018-04-06 | End: 2018-04-06

## 2018-04-06 RX ORDER — ATENOLOL 25 MG/1
50 TABLET ORAL DAILY
Status: DISCONTINUED | OUTPATIENT
Start: 2018-04-07 | End: 2018-04-07 | Stop reason: HOSPADM

## 2018-04-06 RX ORDER — CEFAZOLIN SODIUM 1 G/3ML
2 INJECTION, POWDER, FOR SOLUTION INTRAMUSCULAR; INTRAVENOUS
Status: COMPLETED | OUTPATIENT
Start: 2018-04-06 | End: 2018-04-06

## 2018-04-06 RX ORDER — FENTANYL CITRATE 50 UG/ML
INJECTION, SOLUTION INTRAMUSCULAR; INTRAVENOUS
Status: DISCONTINUED | OUTPATIENT
Start: 2018-04-06 | End: 2018-04-06

## 2018-04-06 RX ORDER — ROCURONIUM BROMIDE 10 MG/ML
INJECTION, SOLUTION INTRAVENOUS
Status: DISCONTINUED | OUTPATIENT
Start: 2018-04-06 | End: 2018-04-06

## 2018-04-06 RX ORDER — PROPOFOL 10 MG/ML
VIAL (ML) INTRAVENOUS
Status: DISCONTINUED | OUTPATIENT
Start: 2018-04-06 | End: 2018-04-06

## 2018-04-06 RX ORDER — HYDROCODONE BITARTRATE AND ACETAMINOPHEN 5; 325 MG/1; MG/1
1 TABLET ORAL EVERY 6 HOURS PRN
Status: DISCONTINUED | OUTPATIENT
Start: 2018-04-06 | End: 2018-04-07 | Stop reason: HOSPADM

## 2018-04-06 RX ORDER — HEPARIN SODIUM 1000 [USP'U]/ML
INJECTION, SOLUTION INTRAVENOUS; SUBCUTANEOUS
Status: DISCONTINUED | OUTPATIENT
Start: 2018-04-06 | End: 2018-04-06

## 2018-04-06 RX ORDER — MIDAZOLAM HYDROCHLORIDE 1 MG/ML
INJECTION, SOLUTION INTRAMUSCULAR; INTRAVENOUS
Status: DISCONTINUED | OUTPATIENT
Start: 2018-04-06 | End: 2018-04-06

## 2018-04-06 RX ORDER — FENTANYL CITRATE 50 UG/ML
25 INJECTION, SOLUTION INTRAMUSCULAR; INTRAVENOUS EVERY 5 MIN PRN
Status: DISCONTINUED | OUTPATIENT
Start: 2018-04-06 | End: 2018-04-07 | Stop reason: HOSPADM

## 2018-04-06 RX ORDER — PROTAMINE SULFATE 10 MG/ML
INJECTION, SOLUTION INTRAVENOUS
Status: DISCONTINUED | OUTPATIENT
Start: 2018-04-06 | End: 2018-04-06

## 2018-04-06 RX ORDER — SUCCINYLCHOLINE CHLORIDE 20 MG/ML
INJECTION INTRAMUSCULAR; INTRAVENOUS
Status: DISCONTINUED | OUTPATIENT
Start: 2018-04-06 | End: 2018-04-06

## 2018-04-06 RX ORDER — IBUPROFEN 600 MG/1
600 TABLET ORAL EVERY 6 HOURS PRN
Status: DISCONTINUED | OUTPATIENT
Start: 2018-04-06 | End: 2018-04-07 | Stop reason: HOSPADM

## 2018-04-06 RX ORDER — SODIUM CHLORIDE 0.9 % (FLUSH) 0.9 %
3 SYRINGE (ML) INJECTION
Status: DISCONTINUED | OUTPATIENT
Start: 2018-04-06 | End: 2018-04-07 | Stop reason: HOSPADM

## 2018-04-06 RX ORDER — FUROSEMIDE 10 MG/ML
INJECTION INTRAMUSCULAR; INTRAVENOUS
Status: DISCONTINUED | OUTPATIENT
Start: 2018-04-06 | End: 2018-04-06

## 2018-04-06 RX ADMIN — HEPARIN SODIUM 2000 UNITS: 1000 INJECTION INTRAVENOUS; SUBCUTANEOUS at 09:04

## 2018-04-06 RX ADMIN — LIDOCAINE HYDROCHLORIDE 100 MG: 20 INJECTION, SOLUTION INTRAVENOUS at 07:04

## 2018-04-06 RX ADMIN — SODIUM CHLORIDE, SODIUM GLUCONATE, SODIUM ACETATE, POTASSIUM CHLORIDE, MAGNESIUM CHLORIDE, SODIUM PHOSPHATE, DIBASIC, AND POTASSIUM PHOSPHATE: .53; .5; .37; .037; .03; .012; .00082 INJECTION, SOLUTION INTRAVENOUS at 07:04

## 2018-04-06 RX ADMIN — PHENYLEPHRINE HYDROCHLORIDE 100 MCG: 10 INJECTION INTRAVENOUS at 08:04

## 2018-04-06 RX ADMIN — AMLODIPINE BESYLATE 5 MG: 5 TABLET ORAL at 09:04

## 2018-04-06 RX ADMIN — SUCCINYLCHOLINE CHLORIDE 140 MG: 20 INJECTION, SOLUTION INTRAMUSCULAR; INTRAVENOUS at 07:04

## 2018-04-06 RX ADMIN — HEPARIN SODIUM 4000 UNITS: 1000 INJECTION INTRAVENOUS; SUBCUTANEOUS at 10:04

## 2018-04-06 RX ADMIN — HEPARIN SODIUM 6000 UNITS: 1000 INJECTION INTRAVENOUS; SUBCUTANEOUS at 09:04

## 2018-04-06 RX ADMIN — SODIUM CHLORIDE 0.25 MCG/KG/MIN: 9 INJECTION, SOLUTION INTRAVENOUS at 08:04

## 2018-04-06 RX ADMIN — ROSUVASTATIN CALCIUM 40 MG: 20 TABLET, FILM COATED ORAL at 09:04

## 2018-04-06 RX ADMIN — CEFAZOLIN 2 G: 330 INJECTION, POWDER, FOR SOLUTION INTRAMUSCULAR; INTRAVENOUS at 10:04

## 2018-04-06 RX ADMIN — FENTANYL CITRATE 25 MCG: 50 INJECTION, SOLUTION INTRAMUSCULAR; INTRAVENOUS at 11:04

## 2018-04-06 RX ADMIN — ROCURONIUM BROMIDE 5 MG: 10 INJECTION, SOLUTION INTRAVENOUS at 07:04

## 2018-04-06 RX ADMIN — HEPARIN SODIUM 2000 UNITS: 1000 INJECTION INTRAVENOUS; SUBCUTANEOUS at 10:04

## 2018-04-06 RX ADMIN — FENTANYL CITRATE 50 MCG: 50 INJECTION, SOLUTION INTRAMUSCULAR; INTRAVENOUS at 08:04

## 2018-04-06 RX ADMIN — PROTAMINE SULFATE 50 MG: 10 INJECTION, SOLUTION INTRAVENOUS at 10:04

## 2018-04-06 RX ADMIN — ONDANSETRON 4 MG: 2 INJECTION, SOLUTION INTRAMUSCULAR; INTRAVENOUS at 11:04

## 2018-04-06 RX ADMIN — RIVAROXABAN 20 MG: 20 TABLET, FILM COATED ORAL at 09:04

## 2018-04-06 RX ADMIN — MIDAZOLAM 2 MG: 1 INJECTION INTRAMUSCULAR; INTRAVENOUS at 07:04

## 2018-04-06 RX ADMIN — PANTOPRAZOLE SODIUM 40 MG: 40 TABLET, DELAYED RELEASE ORAL at 06:04

## 2018-04-06 RX ADMIN — SODIUM CHLORIDE: 0.9 INJECTION, SOLUTION INTRAVENOUS at 07:04

## 2018-04-06 RX ADMIN — FENTANYL CITRATE 150 MCG: 50 INJECTION, SOLUTION INTRAMUSCULAR; INTRAVENOUS at 07:04

## 2018-04-06 RX ADMIN — PHENYLEPHRINE HYDROCHLORIDE 100 MCG: 10 INJECTION INTRAVENOUS at 07:04

## 2018-04-06 RX ADMIN — HEPARIN SODIUM 12000 UNITS: 1000 INJECTION INTRAVENOUS; SUBCUTANEOUS at 08:04

## 2018-04-06 RX ADMIN — PROPOFOL 150 MG: 10 INJECTION, EMULSION INTRAVENOUS at 07:04

## 2018-04-06 RX ADMIN — FUROSEMIDE 40 MG: 10 INJECTION, SOLUTION INTRAMUSCULAR; INTRAVENOUS at 10:04

## 2018-04-06 NOTE — PROGRESS NOTES
Date Consent signed: 4/6/2018    Sponsor: [employer]    Study Title/IRB Number: [id number]    Principle Investigator: Jose Angel Montes MD    Present for Discussion: Dr. Montes & patient     Is LAR Consenting for Subject: No    Prior to the Informed Consent (IC) being signed, or any study protocol required data collection, testing, procedure, or intervention being performed, the following was done and/or discussed:   Patient was given a copy of the IC for review    Purpose of the study and qualifications to participate    Study design, Follow up schedule, and tests or procedures done at each visit   Confidentiality and HIPAA Authorization for Release of Medical Records for the research trial/ subject's rights/research related injury   Risk, Benefits, Alternative Treatments, Compensation and Costs   Participation in the research trial is voluntary and patient may withdraw at anytime   Contact information for study related questions    Patient verbalizes understanding of the above: Yes  Contact information for CRC and PI given to patient: Yes  Patient able to adequately summarize: the purpose of the study, the risks associated with the study, and all procedures, testing, and follow-ups associated with the study: Yes    He signed the informed consent form for the [employer] research study with an IRB approval date of 10/18/2017.  Each page of the consent form was reviewed with him (and pts family) and all questions answered satisfactorily. He signed the consent form and received a copy of same. The original consent was scanned into electronic medical records (EPIC) and filed into the subject's research study binder.

## 2018-04-06 NOTE — CONSULTS
[facility]  Cardiology  Consult Note    Patient Name: Reji Aleman Jr.  MRN: [id number]  Admission Date: 4/6/2018  Hospital Length of Stay: 0 days  Code Status: Prior   Attending Provider: Jose Angel Montes MD   Consulting Provider: Theresa Gee MD  Primary Care Physician: [facility]  Principal Problem:Atrial fibrillation    Patient information was obtained from patient and ER records.     Consults  Subjective:        HPI:   Reji Aleman Jr. 62 y.o. Male here today for PVI with Dr Montes with AYLEEN prior.     He has PMH of HTN, pAF, CVA. He had CVA 8/4/17 with newly diagnosed atrial fibrillation. He presented with aphasia with complete resolution of symptoms after. He had normal LV function. He was started on rivaroxaban for CVA prophylaxis. Today he is in good spirit and denied any acute complains.     Dysphagia or odynophagia:  No  Liver Disease, esophageal disease, or known varices:  No  Upper GI Bleeding: No  Snoring:  No  Sleep Apnea:  No  Prior neck surgery or radiation:  No  History of anesthetic difficulties:  No  Family history of anesthetic difficulties:  No  Last oral intake:  12 hours ago  Able to move neck in all directions:  Yes          Past Medical History:   Diagnosis Date    Embolic stroke involving right middle cerebral artery 8/4/2017    Hypertension        History reviewed. No pertinent surgical history.    Review of patient's allergies indicates:   Allergen Reactions    Sulfa (sulfonamide antibiotics) Hives       No current facility-administered medications on file prior to encounter.      Current Outpatient Prescriptions on File Prior to Encounter   Medication Sig    amlodipine (NORVASC) 10 MG tablet Take 0.5 tab daily (Patient taking differently: nightly. Take 0.5 tab daily)    atenolol (TENORMIN) 50 MG tablet Take 50 mg by mouth once daily.    rosuvastatin (CRESTOR) 40 MG Tab Take 1 tablet (40 mg total) by mouth every evening.    naproxen  (NAPROSYN) 500 MG tablet TAKE 1 TABLET BYMOUTH TWICE A DAY WITH FOOD    rivaroxaban (XARELTO) 20 mg Tab Take 1 tablet (20 mg total) by mouth daily with dinner or evening meal.    triamcinolone acetonide 0.1% (KENALOG) 0.1 % cream AAA BID x 1-2 wks then prn flares only     Family History     None        Social History Main Topics    Smoking status: Never Smoker    Smokeless tobacco: Never Used    Alcohol use No    Drug use: No    Sexual activity: Not on file     Review of Systems   All other systems reviewed and are negative.    Objective:     Vital Signs (Most Recent):  Temp: 96.3 °F (35.7 °C) (04/06/18 0556)  Pulse: 78 (04/06/18 0556)  Resp: 18 (04/06/18 0556)  BP: 134/84 (04/06/18 0557)  SpO2: 98 % (04/06/18 0556) Vital Signs (24h Range):  Temp:  [96.3 °F (35.7 °C)] 96.3 °F (35.7 °C)  Pulse:  [78] 78  Resp:  [18] 18  SpO2:  [98 %] 98 %  BP: (134-137)/(81-84) 134/84     Weight: 99.8 kg (220 lb)  Body mass index is 27.5 kg/m².    SpO2: 98 %  O2 Device (Oxygen Therapy): room air    No intake or output data in the 24 hours ending 04/06/18 0710    Lines/Drains/Airways     Peripheral Intravenous Line                 Peripheral IV - Single Lumen 04/06/18 0608 Left Forearm less than 1 day         Peripheral IV - Single Lumen 04/06/18 0608 Right Antecubital less than 1 day                Physical Exam   Constitutional: He is oriented to person, place, and time. He appears well-developed.   HENT:   Head: Normocephalic.   Eyes: Pupils are equal, round, and reactive to light.   Neck: Normal range of motion.   Cardiovascular:   Irregularly irregular    Pulmonary/Chest: Effort normal.   Abdominal: Soft.   Musculoskeletal: Normal range of motion.   Neurological: He is alert and oriented to person, place, and time.   Skin: Skin is warm.       Significant Labs: BMP: No results for input(s): GLU, NA, K, CL, CO2, BUN, CREATININE, CALCIUM, MG in the last 48 hours., CMP No results for input(s): NA, K, CL, CO2, GLU, BUN,  CREATININE, CALCIUM, PROT, ALBUMIN, BILITOT, ALKPHOS, AST, ALT, ANIONGAP, ESTGFRAFRICA, EGFRNONAA in the last 48 hours. and CBC No results for input(s): WBC, HGB, HCT, PLT in the last 48 hours.        Assessment and Plan:     * Atrial fibrillation    1. AYLEEN for evaluation of FERNANDO Thrombus.     -The risks, benefits & alternatives of the procedure were explained to the patient.    -The risks of transesophageal echo include but are not limited to:  Dental trauma, esophageal trauma/perforation, bleeding, laryngospasm/brochospasm, aspiration, sore throat/hoarseness, & dislodgement of the endotracheal tube/nasogastric tube (where applicable).    -The risks of moderate sedation include hypotension, respiratory depression, arrhythmias, bronchospasm, & death.    -Informed consent was obtained & the patient is agreeable to proceed with the procedure.    I will discuss with the attending physician. Attending addendum is to follow.                Attending addendum to follow     Theresa Gee MD  Cardiology Fellow  Pager: [phone]

## 2018-04-06 NOTE — ANESTHESIA PREPROCEDURE EVALUATION
04/06/2018  Reji Aleman Jr. is a 62 y.o., male here for PVI ablation for afib. Hx of CVA, no residual deficits    Patient Active Problem List   Diagnosis    Embolic stroke involving right middle cerebral artery    Rapid heart beat    Cavernoma    Aphasia    Expressive aphasia    Fibromuscular dysplasia    Cytotoxic cerebral edema    Paroxysmal atrial fibrillation    Atrial fibrillation         Anesthesia Evaluation    I have reviewed the Patient Summary Reports.    I have reviewed the Nursing Notes.   I have reviewed the Medications.     Review of Systems  Anesthesia Hx:  No problems with previous Anesthesia    Cardiovascular:   Hypertension Dysrhythmias atrial fibrillation    Pulmonary:  Pulmonary Normal    Renal/:  Renal/ Normal     Hepatic/GI:  Hepatic/GI Normal    Neurological:   CVA, no residual symptoms    Endocrine:  Endocrine Normal        Physical Exam  General:  Well nourished    Airway/Jaw/Neck:  Airway Findings: Mouth Opening: Normal Tongue: Normal  General Airway Assessment: Adult  Mallampati: I  TM Distance: Normal, at least 6 cm       Chest/Lungs:  Chest/Lungs Findings: Clear to auscultation, Normal Respiratory Rate     Heart/Vascular:  Heart Findings: Rate: Normal  Rhythm: Regular Rhythm             Anesthesia Plan  Type of Anesthesia, risks & benefits discussed:  Anesthesia Type:  general  Patient's Preference:   Intra-op Monitoring Plan: arterial line and standard ASA monitors  Intra-op Monitoring Plan Comments:   Post Op Pain Control Plan: IV/PO Opioids PRN  Post Op Pain Control Plan Comments:   Induction:   IV  Beta Blocker:    Patient is on a Beta-Blocker and is not required to be redosed perioperatively for the following medical conditions:   Beta Blocker Comments: Holding for procedure  Informed Consent: Patient understands risks and agrees with Anesthesia plan.   Questions answered. Anesthesia consent signed with patient.  ASA Score: 3     Day of Surgery Review of History & Physical:            Ready For Surgery From Anesthesia Perspective.

## 2018-04-06 NOTE — ASSESSMENT & PLAN NOTE
1. AYLEEN for evaluation of FERNANDO Thrombus.     -The risks, benefits & alternatives of the procedure were explained to the patient.    -The risks of transesophageal echo include but are not limited to:  Dental trauma, esophageal trauma/perforation, bleeding, laryngospasm/brochospasm, aspiration, sore throat/hoarseness, & dislodgement of the endotracheal tube/nasogastric tube (where applicable).    -The risks of moderate sedation include hypotension, respiratory depression, arrhythmias, bronchospasm, & death.    -Informed consent was obtained & the patient is agreeable to proceed with the procedure.    I will discuss with the attending physician. Attending addendum is to follow.

## 2018-04-06 NOTE — HPI
Reji Aleman Jr. 62 y.o. Male with HTN, pAF, CVA. He suffered a frontal lobe CVA 8/4/17 with newly diagnosed atrial fibrillation. He presented with aphasia and had resolution of symptoms without invasive therapy. He had normal LV function. He was started on rivaroxaban for CVA prophylaxis. He was started on atenolol for rate control. In retrospect he denies any preceding symptoms. He has had subsequent AF episodes on event monitor that have been mostly asymptomatic. He was in AF 10/9/17 during his echo without symptoms. He noticed no change in his overall state of health over the past few weeks to months. He has had symptomatic palpitations in the past but these have never been linked to AF. He has recovered from his CVA without residual deficit.      MRI brain:  Small focus of acute cortical infarct in the right frontal lobe with associated microhemorrhage.      Echo:    1 - Mild left atrial enlargement.     2 - Normal left ventricular systolic function (EF 60-65%).     3 - Impaired LV relaxation, elevated LAP (grade 2 diastolic dysfunction).     4 - Normal right ventricular systolic function .     5 - Mild mitral regurgitation.     6 - The estimated PA systolic pressure is 36 mmHg.     Denies fever, chills, SOB, CP.   Last dose xarelto: 4/4/18 - 2 evenings ago

## 2018-04-06 NOTE — HPI
Reji Aleman . 62 y.o. Male here today for PVI with Dr Montes with AYLEEN prior.     He has PMH of HTN, pAF, CVA. He had CVA 8/4/17 with newly diagnosed atrial fibrillation. He presented with aphasia with complete resolution of symptoms after. He had normal LV function. He was started on rivaroxaban for CVA prophylaxis. Today he is in good spirit and denied any acute complains.     Dysphagia or odynophagia:  No  Liver Disease, esophageal disease, or known varices:  No  Upper GI Bleeding: No  Snoring:  No  Sleep Apnea:  No  Prior neck surgery or radiation:  No  History of anesthetic difficulties:  No  Family history of anesthetic difficulties:  No  Last oral intake:  12 hours ago  Able to move neck in all directions:  Yes

## 2018-04-06 NOTE — ANESTHESIA PROCEDURE NOTES
Arterial    Diagnosis: afib    Patient location during procedure: done in OR  Procedure start time: 4/6/2018 8:10 AM  Timeout: 4/6/2018 8:10 AM  Procedure end time: 4/6/2018 8:13 AM  Staffing  Anesthesiologist: VIANNEY KUMAR  Performed: anesthesiologist   Anesthesiologist was present at the time of the procedure.  Preanesthetic Checklist  Completed: patient identified, site marked, surgical consent, pre-op evaluation, timeout performed, IV checked, risks and benefits discussed, monitors and equipment checked and anesthesia consent givenArterial  Skin Prep: chlorhexidine gluconate  Local Infiltration: none  Orientation: right  Location: radial  Catheter Size: 20 G  Catheter placement by Anatomical landmarks. Heme positive aspiration all ports.Insertion Attempts: 1  Assessment  Dressing: secured with tape and tegaderm  Patient: Tolerated well

## 2018-04-06 NOTE — ANESTHESIA POSTPROCEDURE EVALUATION
"Anesthesia Post Evaluation    Patient: Reji Aleman Jr.    Procedure(s) Performed: Procedure(s) (LRB):  ABLATION (N/A)  TRANSESOPHAGEAL ECHOCARDIOGRAM (AYLEEN) (N/A)    Final Anesthesia Type: general  Patient location during evaluation: PACU  Patient participation: Yes- Able to Participate  Level of consciousness: awake and alert  Post-procedure vital signs: reviewed and stable  Pain management: adequate  Airway patency: patent  PONV status at discharge: No PONV  Anesthetic complications: no      Cardiovascular status: blood pressure returned to baseline  Respiratory status: unassisted  Hydration status: euvolemic  Follow-up not needed.        Visit Vitals  /77 (BP Location: Right arm, Patient Position: Lying)   Pulse 76   Temp 36.6 °C (97.9 °F) (Oral)   Resp 17   Ht 6' 3" (1.905 m)   Wt 99.8 kg (220 lb)   SpO2 96%   BMI 27.50 kg/m²       Pain/Delia Score: Pain Assessment Performed: Yes (4/6/2018  1:00 PM)  Presence of Pain: denies (4/6/2018  1:00 PM)  Delia Score: 10 (4/6/2018 12:45 PM)      "

## 2018-04-06 NOTE — NURSING TRANSFER
Nursing Transfer Note      4/6/2018     Transfer To: 315    Transfer via stretcher    Transfer with cardiac monitoring    Transported by RN    Medicines sent: no    Chart send with patient: Yes    Notified: sister    Patient reassessed at: 4/6/18@1245hrs

## 2018-04-06 NOTE — SUBJECTIVE & OBJECTIVE
Past Medical History:   Diagnosis Date    Embolic stroke involving right middle cerebral artery 8/4/2017    Hypertension        History reviewed. No pertinent surgical history.    Review of patient's allergies indicates:   Allergen Reactions    Sulfa (sulfonamide antibiotics) Hives       No current facility-administered medications on file prior to encounter.      Current Outpatient Prescriptions on File Prior to Encounter   Medication Sig    amlodipine (NORVASC) 10 MG tablet Take 0.5 tab daily (Patient taking differently: nightly. Take 0.5 tab daily)    atenolol (TENORMIN) 50 MG tablet Take 50 mg by mouth once daily.    rosuvastatin (CRESTOR) 40 MG Tab Take 1 tablet (40 mg total) by mouth every evening.    naproxen (NAPROSYN) 500 MG tablet TAKE 1 TABLET BYMOUTH TWICE A DAY WITH FOOD    rivaroxaban (XARELTO) 20 mg Tab Take 1 tablet (20 mg total) by mouth daily with dinner or evening meal.    triamcinolone acetonide 0.1% (KENALOG) 0.1 % cream AAA BID x 1-2 wks then prn flares only     Family History     None        Social History Main Topics    Smoking status: Never Smoker    Smokeless tobacco: Never Used    Alcohol use No    Drug use: No    Sexual activity: Not on file     Review of Systems   All other systems reviewed and are negative.    Objective:     Vital Signs (Most Recent):  Temp: 96.3 °F (35.7 °C) (04/06/18 0556)  Pulse: 78 (04/06/18 0556)  Resp: 18 (04/06/18 0556)  BP: 134/84 (04/06/18 0557)  SpO2: 98 % (04/06/18 0556) Vital Signs (24h Range):  Temp:  [96.3 °F (35.7 °C)] 96.3 °F (35.7 °C)  Pulse:  [78] 78  Resp:  [18] 18  SpO2:  [98 %] 98 %  BP: (134-137)/(81-84) 134/84     Weight: 99.8 kg (220 lb)  Body mass index is 27.5 kg/m².    SpO2: 98 %  O2 Device (Oxygen Therapy): room air    No intake or output data in the 24 hours ending 04/06/18 0710    Lines/Drains/Airways     Peripheral Intravenous Line                 Peripheral IV - Single Lumen 04/06/18 0608 Left Forearm less than 1 day          Peripheral IV - Single Lumen 04/06/18 0608 Right Antecubital less than 1 day                Physical Exam   Constitutional: He is oriented to person, place, and time. He appears well-developed.   HENT:   Head: Normocephalic.   Eyes: Pupils are equal, round, and reactive to light.   Neck: Normal range of motion.   Cardiovascular:   Irregularly irregular    Pulmonary/Chest: Effort normal.   Abdominal: Soft.   Musculoskeletal: Normal range of motion.   Neurological: He is alert and oriented to person, place, and time.   Skin: Skin is warm.       Significant Labs: BMP: No results for input(s): GLU, NA, K, CL, CO2, BUN, CREATININE, CALCIUM, MG in the last 48 hours., CMP No results for input(s): NA, K, CL, CO2, GLU, BUN, CREATININE, CALCIUM, PROT, ALBUMIN, BILITOT, ALKPHOS, AST, ALT, ANIONGAP, ESTGFRAFRICA, EGFRNONAA in the last 48 hours. and CBC No results for input(s): WBC, HGB, HCT, PLT in the last 48 hours.

## 2018-04-06 NOTE — ASSESSMENT & PLAN NOTE
Plan for RFA-PVI and ILR implant today 4/6/18  AYLEEN prior to r/o thrombus  Anesthesia for sedation  Last dose xarelto: 2 evenings ago as instructed

## 2018-04-06 NOTE — H&P
[facility]  Cardiac Electrophysiology  History and Physical     Admission Date: 4/6/2018  Code Status: Prior   Attending Provider: Jose Angel Montes MD   Principal Problem:Atrial fibrillation    Subjective:     Chief Complaint:  Presents for RFA-PVI and ILR implant; AYLEEN prior     HPI:  Reji Aleman Jr. 62 y.o. Male with HTN, pAF, CVA. He suffered a frontal lobe CVA 8/4/17 with newly diagnosed atrial fibrillation. He presented with aphasia and had resolution of symptoms without invasive therapy. He had normal LV function. He was started on rivaroxaban for CVA prophylaxis. He was started on atenolol for rate control. In retrospect he denies any preceding symptoms. He has had subsequent AF episodes on event monitor that have been mostly asymptomatic. He was in AF 10/9/17 during his echo without symptoms. He noticed no change in his overall state of health over the past few weeks to months. He has had symptomatic palpitations in the past but these have never been linked to AF. He has recovered from his CVA without residual deficit.      MRI brain:  Small focus of acute cortical infarct in the right frontal lobe with associated microhemorrhage.      Echo:    1 - Mild left atrial enlargement.     2 - Normal left ventricular systolic function (EF 60-65%).     3 - Impaired LV relaxation, elevated LAP (grade 2 diastolic dysfunction).     4 - Normal right ventricular systolic function .     5 - Mild mitral regurgitation.     6 - The estimated PA systolic pressure is 36 mmHg.     Denies fever, chills, SOB, CP.   Last dose xarelto: 4/4/18 - 2 evenings ago      Past Medical History:   Diagnosis Date    Embolic stroke involving right middle cerebral artery 8/4/2017    Hypertension        History reviewed. No pertinent surgical history.    Review of patient's allergies indicates:   Allergen Reactions    Sulfa (sulfonamide antibiotics) Hives       No current facility-administered medications on file prior  to encounter.      Current Outpatient Prescriptions on File Prior to Encounter   Medication Sig    amlodipine (NORVASC) 10 MG tablet Take 0.5 tab daily (Patient taking differently: nightly. Take 0.5 tab daily)    atenolol (TENORMIN) 50 MG tablet Take 50 mg by mouth once daily.    rosuvastatin (CRESTOR) 40 MG Tab Take 1 tablet (40 mg total) by mouth every evening.    naproxen (NAPROSYN) 500 MG tablet TAKE 1 TABLET BYMOUTH TWICE A DAY WITH FOOD    rivaroxaban (XARELTO) 20 mg Tab Take 1 tablet (20 mg total) by mouth daily with dinner or evening meal.    triamcinolone acetonide 0.1% (KENALOG) 0.1 % cream AAA BID x 1-2 wks then prn flares only     Family History     None        Social History Main Topics    Smoking status: Never Smoker    Smokeless tobacco: Never Used    Alcohol use No    Drug use: No    Sexual activity: Not on file     Review of Systems   Constitution: Negative for chills, decreased appetite, fever, weakness, weight gain and weight loss.   Eyes: Negative for blurred vision.   Cardiovascular: Negative for chest pain, claudication, leg swelling and syncope.   Respiratory: Negative for cough and shortness of breath.    Hematologic/Lymphatic: Negative for bleeding problem. Does not bruise/bleed easily.   Skin: Negative for rash.   Musculoskeletal: Negative for joint pain, muscle cramps and muscle weakness.   Gastrointestinal: Negative for abdominal pain, change in bowel habit, diarrhea, nausea and vomiting.   Neurological: Negative for headaches, numbness and paresthesias.   Psychiatric/Behavioral: Negative for altered mental status.     Objective:     Vital Signs (Most Recent):  Temp: 96.3 °F (35.7 °C) (04/06/18 0556)  Pulse: 78 (04/06/18 0556)  Resp: 18 (04/06/18 0556)  BP: 134/84 (04/06/18 0557)  SpO2: 98 % (04/06/18 0556) Vital Signs (24h Range):  Temp:  [96.3 °F (35.7 °C)] 96.3 °F (35.7 °C)  Pulse:  [78] 78  Resp:  [18] 18  SpO2:  [98 %] 98 %  BP: (134-137)/(81-84) 134/84     Weight: 99.8 kg  (220 lb)  Body mass index is 27.5 kg/m².    SpO2: 98 %  O2 Device (Oxygen Therapy): room air    Physical Exam   Constitutional: He is oriented to person, place, and time. He appears well-developed and well-nourished. No distress.   HENT:   Head: Normocephalic and atraumatic.   Neck: Normal range of motion. Neck supple.   Cardiovascular: Normal rate, normal heart sounds and intact distal pulses.  An irregularly irregular rhythm present.   No murmur heard.  Pulses:       Radial pulses are 2+ on the right side, and 2+ on the left side.   Pulmonary/Chest: Effort normal and breath sounds normal. No respiratory distress. He has no wheezes.   Abdominal: Soft. There is no tenderness.   Musculoskeletal: He exhibits no edema.   Neurological: He is alert and oriented to person, place, and time.   Skin: Skin is warm and dry. No erythema.   Psychiatric: He has a normal mood and affect.       Significant Labs: EP: 4/3/18 wnl  Significant Imaging: EKG: AFib; ventricular rate: 80s    Assessment and Plan:     * Atrial fibrillation    Plan for RFA-PVI today 4/6/18  AYLEEN prior to r/o thrombus  Anesthesia for sedation  Last dose xarelto: 2 evenings ago as instructed          Prior to procedure, extensive discussion with patient regarding risks and benefits of  PVI and ILR, and patient  would like to proceed.  Risks of procedure include but are not limited to the risk of infection, bleeding, stroke, paralysis,pulmonary vein stenosis, atrioesophageal fistula  MI, death, embolism, perforation requiring emergency draining or surgery, AV block, possibility for need for  pacemaker implantation.  The patient voices understanding and all questions have been answered. No further questions/concerns voiced at this time.          Lynette Greco, SIN  Cardiac Electrophysiology  [facility]

## 2018-04-06 NOTE — TRANSFER OF CARE
"Anesthesia Transfer of Care Note    Patient: Reji Aleman Jr.    Procedure(s) Performed: Procedure(s) (LRB):  ABLATION (N/A)  TRANSESOPHAGEAL ECHOCARDIOGRAM (AYLEEN) (N/A)    Patient location: PACU    Anesthesia Type: general    Transport from OR: Transported from OR on 6-10 L/min O2 by face mask with adequate spontaneous ventilation    Post pain: adequate analgesia    Post assessment: tolerated procedure well and no apparent anesthetic complications    Post vital signs: stable    Level of consciousness: awake    Nausea/Vomiting: no nausea/vomiting    Complications: none    Transfer of care protocol was followed      Last vitals:   Visit Vitals  /71   Pulse 75   Temp 36.4 °C (97.5 °F) (Skin)   Resp 15   Ht 6' 3" (1.905 m)   Wt 99.8 kg (220 lb)   SpO2 100%   BMI 27.50 kg/m²     "

## 2018-04-06 NOTE — PLAN OF CARE
Amb in nur with pt. R groin dressing saturated and nickel sized swelling noted. MD Moeller notified.

## 2018-04-06 NOTE — PLAN OF CARE
Received report from VANESSA Donaldson. Patient s/p PVI and Loop recorder placement, AAOx3. VSS, no c/o pain or discomfort at this time, resp even and unlabored. Gauze/tegaderm dressing to juancarlos groin and mepilex to chest wall is CDI. No active bleeding. No hematoma noted. Post procedure protocol reviewed with patient and patient's family. Understanding verbalized. Family members at bedside. Nurse call bell within reach. Will continue to monitor per post procedure protocol.

## 2018-04-06 NOTE — PLAN OF CARE
MD Moeller said to proceed with 2 more hours of bedrest, and hold xaerelto until 8 pm when we reassess.

## 2018-04-06 NOTE — SUBJECTIVE & OBJECTIVE
Past Medical History:   Diagnosis Date    Embolic stroke involving right middle cerebral artery 8/4/2017    Hypertension        History reviewed. No pertinent surgical history.    Review of patient's allergies indicates:   Allergen Reactions    Sulfa (sulfonamide antibiotics) Hives       No current facility-administered medications on file prior to encounter.      Current Outpatient Prescriptions on File Prior to Encounter   Medication Sig    amlodipine (NORVASC) 10 MG tablet Take 0.5 tab daily (Patient taking differently: nightly. Take 0.5 tab daily)    atenolol (TENORMIN) 50 MG tablet Take 50 mg by mouth once daily.    rosuvastatin (CRESTOR) 40 MG Tab Take 1 tablet (40 mg total) by mouth every evening.    naproxen (NAPROSYN) 500 MG tablet TAKE 1 TABLET BYMOUTH TWICE A DAY WITH FOOD    rivaroxaban (XARELTO) 20 mg Tab Take 1 tablet (20 mg total) by mouth daily with dinner or evening meal.    triamcinolone acetonide 0.1% (KENALOG) 0.1 % cream AAA BID x 1-2 wks then prn flares only     Family History     None        Social History Main Topics    Smoking status: Never Smoker    Smokeless tobacco: Never Used    Alcohol use No    Drug use: No    Sexual activity: Not on file     Review of Systems   Constitution: Negative for chills, decreased appetite, fever, weakness and weight loss.   Eyes: Negative for blurred vision.   Cardiovascular: Negative for chest pain, claudication, leg swelling and syncope.   Respiratory: Negative for cough and shortness of breath.    Hematologic/Lymphatic: Negative for bleeding problem. Does not bruise/bleed easily.   Skin: Negative for rash.   Musculoskeletal: Negative for joint pain, muscle cramps and muscle weakness.   Gastrointestinal: Negative for abdominal pain, change in bowel habit, diarrhea, nausea and vomiting.   Neurological: Negative for headaches, numbness and paresthesias.   Psychiatric/Behavioral: Negative for altered mental status.     Objective:     Vital Signs  (Most Recent):  Temp: 96.3 °F (35.7 °C) (04/06/18 0556)  Pulse: 78 (04/06/18 0556)  Resp: 18 (04/06/18 0556)  BP: 134/84 (04/06/18 0557)  SpO2: 98 % (04/06/18 0556) Vital Signs (24h Range):  Temp:  [96.3 °F (35.7 °C)] 96.3 °F (35.7 °C)  Pulse:  [78] 78  Resp:  [18] 18  SpO2:  [98 %] 98 %  BP: (134-137)/(81-84) 134/84     Weight: 99.8 kg (220 lb)  Body mass index is 27.5 kg/m².    SpO2: 98 %  O2 Device (Oxygen Therapy): room air    Physical Exam   Constitutional: He is oriented to person, place, and time. He appears well-developed and well-nourished. No distress.   HENT:   Head: Normocephalic and atraumatic.   Neck: Normal range of motion. Neck supple.   Cardiovascular: Normal rate, normal heart sounds and intact distal pulses.  An irregularly irregular rhythm present.   No murmur heard.  Pulses:       Radial pulses are 2+ on the right side, and 2+ on the left side.   Pulmonary/Chest: Effort normal and breath sounds normal. No respiratory distress. He has no wheezes.   Abdominal: Soft. There is no tenderness.   Musculoskeletal: He exhibits no edema.   Neurological: He is alert and oriented to person, place, and time.   Skin: Skin is warm and dry. No erythema.   Psychiatric: He has a normal mood and affect.       Significant Labs: EP: 4/3/18 wnl  Significant Imaging: EKG: AFib; ventricular rate: 80s

## 2018-04-07 VITALS
RESPIRATION RATE: 18 BRPM | OXYGEN SATURATION: 96 % | TEMPERATURE: 98 F | HEIGHT: 75 IN | BODY MASS INDEX: 27.35 KG/M2 | WEIGHT: 220 LBS | DIASTOLIC BLOOD PRESSURE: 81 MMHG | HEART RATE: 81 BPM | SYSTOLIC BLOOD PRESSURE: 119 MMHG

## 2018-04-07 PROCEDURE — 25000003 PHARM REV CODE 250: Performed by: NURSE PRACTITIONER

## 2018-04-07 RX ADMIN — ATENOLOL 50 MG: 25 TABLET ORAL at 07:04

## 2018-04-07 RX ADMIN — PANTOPRAZOLE SODIUM 40 MG: 40 TABLET, DELAYED RELEASE ORAL at 07:04

## 2018-04-07 NOTE — PLAN OF CARE
Problem: Patient Care Overview  Goal: Plan of Care Review  Outcome: Ongoing (interventions implemented as appropriate)  Bilateral groin site remain CDI, no bleeding or hematoma noted.  Pt up at em, steady gait.  Sandhu d/c'd at 2000, due to void at 0200.  Will cont to monitor.

## 2018-04-07 NOTE — DISCHARGE SUMMARY
[facility]  Cardiac Electrophysiology  Discharge Summary      Patient Name: Reji Aleman Jr.  MRN: [id number]  Admission Date: 4/6/2018  Hospital Length of Stay: 0 days  Discharge Date and Time:  04/07/2018 7:38 AM  Attending Physician: Jose Angel Montes MD    Discharging Provider: Armaan Varner MD  Primary Care Physician: Andrés Of EveColleen    HPI:   Reji Aleman Jr. 62 y.o. Male with HTN, pAF, CVA. He suffered a frontal lobe CVA 8/4/17 with newly diagnosed atrial fibrillation. He presented with aphasia and had resolution of symptoms without invasive therapy. He had normal LV function. He was started on rivaroxaban for CVA prophylaxis. He was started on atenolol for rate control. In retrospect he denies any preceding symptoms. He has had subsequent AF episodes on event monitor that have been mostly asymptomatic. He was in AF 10/9/17 during his echo without symptoms. He noticed no change in his overall state of health over the past few weeks to months. He has had symptomatic palpitations in the past but these have never been linked to AF. He has recovered from his CVA without residual deficit.      MRI brain:  Small focus of acute cortical infarct in the right frontal lobe with associated microhemorrhage.      Echo:    1 - Mild left atrial enlargement.     2 - Normal left ventricular systolic function (EF 60-65%).     3 - Impaired LV relaxation, elevated LAP (grade 2 diastolic dysfunction).     4 - Normal right ventricular systolic function .     5 - Mild mitral regurgitation.     6 - The estimated PA systolic pressure is 36 mmHg.     Denies fever, chills, SOB, CP.   Last dose xarelto: 4/4/18 - 2 evenings ago      Procedure(s) (LRB):  ABLATION (N/A)  TRANSESOPHAGEAL ECHOCARDIOGRAM (AYLEEN) (N/A)     Indwelling Lines/Drains at time of discharge:  Lines/Drains/Airways          No matching active lines, drains, or airways          Hospital Course:  Successful pulmonary vein RF ablation.  Successful implant: Loop Recorder..    Interval history: No acute events overnight. No TELE events.    Review of Systems   Constitutional: Negative.    HENT: Negative.    Eyes: Negative.    Respiratory: Negative.    Cardiovascular: Negative for chest pain, palpitations, orthopnea, leg swelling and PND.   Gastrointestinal: Negative.    Genitourinary: Negative.    Musculoskeletal: Negative.    Skin: Negative.    Neurological: Negative.    Endo/Heme/Allergies: Negative.    Psychiatric/Behavioral: Negative.      Physical Exam   Constitutional: He is oriented to person, place, and time and well-developed, well-nourished, and in no distress.   HENT:   Head: Normocephalic.   Eyes: Right eye exhibits no discharge. Left eye exhibits no discharge.   Cardiovascular: Normal rate and regular rhythm.  Exam reveals no gallop and no friction rub.    Pulmonary/Chest: Effort normal and breath sounds normal.   Abdominal: Soft. Bowel sounds are normal.   Musculoskeletal: He exhibits no edema.   Neurological: He is alert and oriented to person, place, and time.   Skin: Skin is warm and dry.   No hematoma appreciated in the b/l LEs groin sites.   Psychiatric: Affect normal.     Significant Diagnostic Studies: None    Pending Diagnostic Studies:     None          Final Active Diagnoses:    Diagnosis Date Noted POA    PRINCIPAL PROBLEM:  Atrial fibrillation [I48.91] 04/06/2018 Yes      Problems Resolved During this Admission:    Diagnosis Date Noted Date Resolved POA     No new Assessment & Plan notes have been filed under this hospital service since the last note was generated.  Service: Arrhythmia      Discharged Condition: good    Disposition: Home or Self Care    Follow Up:  Follow-up Information     Jose Angel Montes MD.    Specialties:  Electrophysiology, Cardiovascular Disease  Contact information:  [address] [place] [address] [id number] [phone]                 Patient Instructions:     Diet Cardiac     Activity as  tolerated     Notify your health care provider if you experience any of the following:  temperature >100.4     Notify your health care provider if you experience any of the following:  persistent nausea and vomiting or diarrhea     Notify your health care provider if you experience any of the following:  severe uncontrolled pain     Notify your health care provider if you experience any of the following:  redness, tenderness, or signs of infection (pain, swelling, redness, odor or green/yellow discharge around incision site)     Notify your health care provider if you experience any of the following:  difficulty breathing or increased cough     Notify your health care provider if you experience any of the following:  severe persistent headache     Notify your health care provider if you experience any of the following:  worsening rash     Notify your health care provider if you experience any of the following:  persistent dizziness, light-headedness, or visual disturbances     Notify your health care provider if you experience any of the following:  increased confusion or weakness     Notify your health care provider if you experience any of the following:       Medications:  Reconciled Home Medications:      Medication List      START taking these medications    pantoprazole 40 MG tablet  Commonly known as:  PROTONIX  Take 1 tablet (40 mg total) by mouth once daily.        CHANGE how you take these medications    amLODIPine 10 MG tablet  Commonly known as:  NORVASC  Take 0.5 tab daily  What changed:  · when to take this  · additional instructions        CONTINUE taking these medications    atenolol 50 MG tablet  Commonly known as:  TENORMIN     rivaroxaban 20 mg Tab  Commonly known as:  XARELTO  Take 1 tablet (20 mg total) by mouth daily with dinner or evening meal.     rosuvastatin 40 MG Tab  Commonly known as:  CRESTOR  Take 1 tablet (40 mg total) by mouth every evening.     triamcinolone acetonide 0.1% 0.1 %  cream  Commonly known as:  KENALOG  AAA BID x 1-2 wks then prn flares only        ASK your doctor about these medications    naproxen 500 MG tablet  Commonly known as:  NAPROSYN           Where to Get Your Medications      These medications were sent to [facility]/pharmacy #[id number] - [place], [place] - [address]  [address], [facility] [address]    Phone:  [phone]   · pantoprazole 40 MG tablet           Armaan Vraner MD  Cardiac Electrophysiology  [facility]

## 2018-04-07 NOTE — PROGRESS NOTES
Called regarding dressing saturation+ ?swelling, patient s/p RFA via CFV for atrial fibrillation-On exam minimal ooze-stopped after holding pressure, no hematoma, site soft, non tender.Distal pulses palpable.extended bed rest x 2 hrs.    Rechecked patient no bleeding, no hematoma.site CDI

## 2018-04-19 DIAGNOSIS — Z95.9 CARDIAC DEVICE IN SITU: Primary | ICD-10-CM

## 2018-04-19 DIAGNOSIS — I48.91 ATRIAL FIBRILLATION, UNSPECIFIED TYPE: ICD-10-CM

## 2018-04-20 ENCOUNTER — TELEPHONE (OUTPATIENT)
Dept: ELECTROPHYSIOLOGY | Facility: CLINIC | Age: 63
End: 2018-04-20

## 2018-04-20 ENCOUNTER — DOCUMENTATION ONLY (OUTPATIENT)
Dept: ELECTROPHYSIOLOGY | Facility: CLINIC | Age: 63
End: 2018-04-20

## 2018-04-20 ENCOUNTER — CLINICAL SUPPORT (OUTPATIENT)
Dept: ELECTROPHYSIOLOGY | Facility: CLINIC | Age: 63
End: 2018-04-20
Payer: COMMERCIAL

## 2018-04-20 DIAGNOSIS — Z95.9 CARDIAC DEVICE IN SITU: ICD-10-CM

## 2018-04-20 DIAGNOSIS — I48.91 ATRIAL FIBRILLATION, UNSPECIFIED TYPE: ICD-10-CM

## 2018-04-20 PROCEDURE — 93291 INTERROG DEV EVAL SCRMS IP: CPT | Mod: S$GLB,,, | Performed by: INTERNAL MEDICINE

## 2018-04-20 NOTE — PROGRESS NOTES
AF?   Received: Today   Message Contents   Lisa Sen, RN  Jose Angel Montes MD   Cc: Shelby Montes,   Please view loop strips to confirm AF.   Pt is post RFA for AF, still in Blanking period on Xarelto.   Let me know if there are further orders or just to continue to monitor.   Thanks,   Lisa      Will leave strips for Dr. Montes to view.

## 2018-04-20 NOTE — TELEPHONE ENCOUNTER
RE: AF?   Received: Today   Message Contents   MD Lisa Venegas RN   Cc: Shelby Thompson             Lets continue to monitor. Thanks, MB    Previous Messages      ----- Message -----   From: Lisa Sen RN   Sent: 4/20/2018  12:13 PM   To: Jose Angel Montes MD, Shelby Thompson   Subject: AF?                                               Dr. Montes,   Please view loop strips to confirm AF.   Pt is post RFA for AF, still in Blanking period on Xarelto.   Let me know if there are further orders or just to continue to monitor.   Thanks,   Lisa

## 2018-04-25 DIAGNOSIS — L29.9 PRURITUS: ICD-10-CM

## 2018-04-25 RX ORDER — TRIAMCINOLONE ACETONIDE 1 MG/G
CREAM TOPICAL
Qty: 454 G | Refills: 0 | Status: SHIPPED | OUTPATIENT
Start: 2018-04-25 | End: 2019-08-26 | Stop reason: SDUPTHER

## 2018-05-23 ENCOUNTER — CLINICAL SUPPORT (OUTPATIENT)
Dept: ELECTROPHYSIOLOGY | Facility: CLINIC | Age: 63
End: 2018-05-23
Attending: INTERNAL MEDICINE
Payer: COMMERCIAL

## 2018-05-23 DIAGNOSIS — Z95.9 CARDIAC DEVICE IN SITU: ICD-10-CM

## 2018-05-23 DIAGNOSIS — I48.91 ATRIAL FIBRILLATION, UNSPECIFIED TYPE: ICD-10-CM

## 2018-05-23 PROCEDURE — 93298 REM INTERROG DEV EVAL SCRMS: CPT | Mod: S$GLB,,, | Performed by: INTERNAL MEDICINE

## 2018-05-23 PROCEDURE — 93299 LOOP RECORDER REMOTE: CPT | Mod: S$GLB,,, | Performed by: INTERNAL MEDICINE

## 2018-05-24 DIAGNOSIS — I48.0 PAROXYSMAL ATRIAL FIBRILLATION: ICD-10-CM

## 2018-05-24 DIAGNOSIS — Z95.818 STATUS POST PLACEMENT OF IMPLANTABLE LOOP RECORDER: Primary | ICD-10-CM

## 2018-06-05 ENCOUNTER — OFFICE VISIT (OUTPATIENT)
Dept: ELECTROPHYSIOLOGY | Facility: CLINIC | Age: 63
End: 2018-06-05
Payer: COMMERCIAL

## 2018-06-05 ENCOUNTER — HOSPITAL ENCOUNTER (OUTPATIENT)
Dept: CARDIOLOGY | Facility: CLINIC | Age: 63
Discharge: HOME | End: 2018-06-05
Payer: COMMERCIAL

## 2018-06-05 VITALS
WEIGHT: 219.38 LBS | SYSTOLIC BLOOD PRESSURE: 114 MMHG | BODY MASS INDEX: 27.28 KG/M2 | HEIGHT: 75 IN | DIASTOLIC BLOOD PRESSURE: 86 MMHG | HEART RATE: 67 BPM

## 2018-06-05 DIAGNOSIS — I48.0 PAF (PAROXYSMAL ATRIAL FIBRILLATION): ICD-10-CM

## 2018-06-05 DIAGNOSIS — I48.0 PAROXYSMAL ATRIAL FIBRILLATION: Primary | ICD-10-CM

## 2018-06-05 DIAGNOSIS — I63.411 EMBOLIC STROKE INVOLVING RIGHT MIDDLE CEREBRAL ARTERY: ICD-10-CM

## 2018-06-05 PROCEDURE — 99999 PR PBB SHADOW E&M-EST. PATIENT-LVL III: CPT | Mod: PBBFAC,,, | Performed by: INTERNAL MEDICINE

## 2018-06-05 PROCEDURE — 93000 ELECTROCARDIOGRAM COMPLETE: CPT | Mod: S$GLB,,, | Performed by: INTERNAL MEDICINE

## 2018-06-05 PROCEDURE — 99024 POSTOP FOLLOW-UP VISIT: CPT | Mod: S$GLB,,, | Performed by: INTERNAL MEDICINE

## 2018-06-05 RX ORDER — TRIAMCINOLONE ACETONIDE 1 MG/G
OINTMENT TOPICAL
Refills: 0 | COMMUNITY
Start: 2018-04-25

## 2018-06-05 NOTE — Clinical Note
Still has some AF, but improved since his ablation. I think he developed persistent AF as we were waiting for him to schedule his case. Now he is paroxysmal.   Thanks, MB

## 2018-06-05 NOTE — PROGRESS NOTES
Subjective:    Patient ID:  Reji Aleman Jr. is a 63 y.o. male who presents for follow-up of Atrial Fibrillation      63 yoM HTN, pAF, CVA here for AF management. He suffered a frontal lobe CVA 8/4/17 with newly diagnosed atrial fibrillation. He presented with aphasia and had resolution of symptoms without invasive therapy. He had normal LV function. He was started on rivaroxaban for CVA prophylaxis. He was started on atenolol for rate control. In retrospect he denies any preceding symptoms. He has had subsequent AF episodes on event monitor that have been mostly asymptomatic. He was in AF 10/9/17 during his echo without symptoms. He noticed no change in his overall state of health over the past few weeks to months. He has had symptomatic palpitations in the past but these have never been linked to AF. He has recovered from his CVA without residual deficit.     Interval history: Cryo PVI 4/6/18 with ILR implant. Symptoms mostly unchanged. AF burden 13% since PVI. No complications.     MRI brain:  Small focus of acute cortical infarct in the right frontal lobe with associated microhemorrhage.      Echo:    1 - Mild left atrial enlargement.     2 - Normal left ventricular systolic function (EF 60-65%).     3 - Impaired LV relaxation, elevated LAP (grade 2 diastolic dysfunction).     4 - Normal right ventricular systolic function .     5 - Mild mitral regurgitation.     6 - The estimated PA systolic pressure is 36 mmHg.     Past Medical History:  No date: Atrial fibrillation  8/4/2017: Embolic stroke involving right middle cerebral*  No date: Hypertension    Past Surgical History:  04/06/2018: RADIOFREQUENCY ABLATION      Comment: Cryo PVI    Social History    Marital status: Single              Spouse name:                       Years of education:                 Number of children:               Occupational History    None on file    Social History Main Topics    Smoking status: Never Smoker                                                                 Smokeless tobacco: Never Used                        Alcohol use: No              Drug use: No              Sexual activity: Not on file          Other Topics            Concern    None on file    Social History Narrative    None on file    Review of patient's family history indicates:  Problem: Melanoma      Relation: Neg Hx       Age of Onset: (Not Specified)           Review of Systems   Constitution: Negative.   HENT: Negative.    Eyes: Negative.    Cardiovascular: Positive for irregular heartbeat. Negative for chest pain, dyspnea on exertion, leg swelling, near-syncope, palpitations and syncope.   Respiratory: Negative.  Negative for shortness of breath.    Endocrine: Negative.    Hematologic/Lymphatic: Negative.    Skin: Negative.    Musculoskeletal: Negative.    Gastrointestinal: Negative.    Genitourinary: Negative.    Neurological: Negative.  Negative for dizziness and light-headedness.   Psychiatric/Behavioral: Negative.    Allergic/Immunologic: Negative.         Objective:    Physical Exam   Constitutional: He is oriented to person, place, and time. He appears well-developed and well-nourished. No distress.   HENT:   Head: Normocephalic and atraumatic.   Eyes: EOM are normal. Pupils are equal, round, and reactive to light.   Neck: Normal range of motion. No JVD present. No thyromegaly present.   Cardiovascular: Normal rate, regular rhythm, S1 normal, S2 normal and normal heart sounds.  PMI is not displaced.  Exam reveals no gallop and no friction rub.    No murmur heard.  Pulmonary/Chest: Effort normal and breath sounds normal. No respiratory distress. He has no wheezes. He has no rales.   Abdominal: Soft. Bowel sounds are normal. He exhibits no distension. There is no tenderness. There is no rebound and no guarding.   Musculoskeletal: Normal range of motion. He exhibits no edema or tenderness.   Neurological: He is alert and oriented to person, place, and time. No  cranial nerve deficit.   Skin: Skin is warm and dry. No rash noted. No erythema.   Psychiatric: He has a normal mood and affect. His behavior is normal. Judgment and thought content normal.   Vitals reviewed.    ECG: NSR nl IA, QRS, QTc        Assessment:       1. Paroxysmal atrial fibrillation    2. Embolic stroke involving right middle cerebral artery         Plan:       63 yoM pAF, CVA here for post PVI visit. 13% AF on ILR, symptoms stable. Remains on atenolol and xarelto. Will continue current therapy. RTC 6m

## 2018-06-26 ENCOUNTER — CLINICAL SUPPORT (OUTPATIENT)
Dept: ELECTROPHYSIOLOGY | Facility: CLINIC | Age: 63
End: 2018-06-26
Payer: COMMERCIAL

## 2018-06-26 DIAGNOSIS — Z95.818 STATUS POST PLACEMENT OF IMPLANTABLE LOOP RECORDER: ICD-10-CM

## 2018-06-26 DIAGNOSIS — I48.0 PAROXYSMAL ATRIAL FIBRILLATION: ICD-10-CM

## 2018-06-26 PROCEDURE — 93298 REM INTERROG DEV EVAL SCRMS: CPT | Mod: S$GLB,,, | Performed by: INTERNAL MEDICINE

## 2018-06-26 PROCEDURE — 93299 LOOP RECORDER REMOTE: CPT | Mod: S$GLB,,, | Performed by: INTERNAL MEDICINE

## 2018-07-31 ENCOUNTER — TELEPHONE (OUTPATIENT)
Dept: ELECTROPHYSIOLOGY | Facility: CLINIC | Age: 63
End: 2018-07-31

## 2018-07-31 ENCOUNTER — CLINICAL SUPPORT (OUTPATIENT)
Dept: ELECTROPHYSIOLOGY | Facility: CLINIC | Age: 63
End: 2018-07-31
Payer: COMMERCIAL

## 2018-07-31 DIAGNOSIS — Z95.818 STATUS POST PLACEMENT OF IMPLANTABLE LOOP RECORDER: ICD-10-CM

## 2018-07-31 DIAGNOSIS — I48.0 PAROXYSMAL ATRIAL FIBRILLATION: ICD-10-CM

## 2018-07-31 PROCEDURE — 93299 LOOP RECORDER REMOTE: CPT | Mod: S$GLB,,, | Performed by: INTERNAL MEDICINE

## 2018-07-31 PROCEDURE — 93298 REM INTERROG DEV EVAL SCRMS: CPT | Mod: S$GLB,,, | Performed by: INTERNAL MEDICINE

## 2018-07-31 NOTE — TELEPHONE ENCOUNTER
Called patient in reference to remote monitoring for loop recorder.  Scheduled transmission for today wasn't received.  Left message instructing patient to check nicki on mobile device for connectivity and clinic call back number with any questions.

## 2018-09-17 RX ORDER — AMLODIPINE BESYLATE 10 MG/1
TABLET ORAL
Qty: 45 TABLET | Refills: 3 | Status: SHIPPED | OUTPATIENT
Start: 2018-09-17 | End: 2019-09-03 | Stop reason: SDUPTHER

## 2018-09-25 ENCOUNTER — CLINICAL SUPPORT (OUTPATIENT)
Dept: ELECTROPHYSIOLOGY | Facility: CLINIC | Age: 63
End: 2018-09-25
Attending: INTERNAL MEDICINE
Payer: COMMERCIAL

## 2018-09-25 DIAGNOSIS — Z95.818 STATUS POST PLACEMENT OF IMPLANTABLE LOOP RECORDER: ICD-10-CM

## 2018-09-25 DIAGNOSIS — I48.0 PAROXYSMAL ATRIAL FIBRILLATION: ICD-10-CM

## 2018-09-25 PROCEDURE — 93299 LOOP RECORDER REMOTE: CPT | Mod: S$GLB,,, | Performed by: INTERNAL MEDICINE

## 2018-09-25 PROCEDURE — 93298 REM INTERROG DEV EVAL SCRMS: CPT | Mod: S$GLB,,, | Performed by: INTERNAL MEDICINE

## 2018-10-18 ENCOUNTER — RESEARCH ENCOUNTER (OUTPATIENT)
Dept: RESEARCH | Facility: HOSPITAL | Age: 63
End: 2018-10-18
Payer: COMMERCIAL

## 2018-10-18 NOTE — PROGRESS NOTES
10/18/2018     Sponsor: [employer]     Study Title: [employer]Site     Visit: 6-month follow-up     Patient assessment done according to protocol: YES   Drug / Device / Intervention education continued.   Patient expresses willingness to continue in study: YES      Patient presented for 6 month follow-up, no procedure related complaints, medication changes or procedure related adverse events.    EQ-5D administered & all questions answered to patients satisfaction, instructed to call with any questions.   Next research visit in approximately 6 months.

## 2018-11-05 ENCOUNTER — OFFICE VISIT (OUTPATIENT)
Dept: CARDIOLOGY | Facility: CLINIC | Age: 63
End: 2018-11-05
Payer: COMMERCIAL

## 2018-11-05 VITALS
HEART RATE: 74 BPM | WEIGHT: 212.94 LBS | BODY MASS INDEX: 26.48 KG/M2 | HEIGHT: 75 IN | SYSTOLIC BLOOD PRESSURE: 116 MMHG | DIASTOLIC BLOOD PRESSURE: 73 MMHG

## 2018-11-05 DIAGNOSIS — I48.0 PAROXYSMAL ATRIAL FIBRILLATION: Primary | ICD-10-CM

## 2018-11-05 DIAGNOSIS — Z86.79 STATUS POST RADIOFREQUENCY ABLATION (RFA) OPERATION FOR ARRHYTHMIA: ICD-10-CM

## 2018-11-05 DIAGNOSIS — Z98.890 STATUS POST RADIOFREQUENCY ABLATION (RFA) OPERATION FOR ARRHYTHMIA: ICD-10-CM

## 2018-11-05 DIAGNOSIS — I63.311 STROKE DUE TO THROMBOSIS OF RIGHT MIDDLE CEREBRAL ARTERY: ICD-10-CM

## 2018-11-05 DIAGNOSIS — I10 ESSENTIAL HYPERTENSION: ICD-10-CM

## 2018-11-05 PROCEDURE — 99214 OFFICE O/P EST MOD 30 MIN: CPT | Mod: S$GLB,,, | Performed by: INTERNAL MEDICINE

## 2018-11-05 PROCEDURE — 99999 PR PBB SHADOW E&M-EST. PATIENT-LVL III: CPT | Mod: PBBFAC,,, | Performed by: INTERNAL MEDICINE

## 2018-11-09 NOTE — PROGRESS NOTES
Subjective:   Patient ID:  Reji Aleman Jr. is a 63 y.o. male who presents for follow-up of PAF (paroxysmal atrial fibrillation) (8 months fu)  Stroke risk factors: HLD, afib,, s/p PVI, h/o embolic CVA  CVA sx resolved.   Asked to be taken off ASA and Plavix and started on Xeralto.       MRI brain:  Small focus of acute cortical infarct in the right frontal lobe with associated microhemorrhage.     HPI:   Non smoker  No family history of heart disease. Sister had Mitral valve disease she had Mitral valve repair.   Does not measure BP at home.   Occasional exercise.   No chest pain, Orthopnea, PND of heart failure symptoms.   Denies palpitations or fluttering in the chest     Echo:    1 - Mild left atrial enlargement.     2 - Normal left ventricular systolic function (EF 60-65%).     3 - Impaired LV relaxation, elevated LAP (grade 2 diastolic dysfunction).     4 - Normal right ventricular systolic function .     5 - Mild mitral regurgitation.     6 - The estimated PA systolic pressure is 36 mmHg.         Event Monitor:  EVENT DETAILS:  The patient's presenting rhythm was sinus rhythm with a rate range of 74-78 beats per minute.  There were three other patient triggered events.  One of them was labeled as routine testing, one was labeled a followup test and one did not   have any symptoms entered.  The initial one without symptoms entered occurred on August 16 at 12:40 a.m. and the activation was consistent with atrial fibrillation with a ventricular rate range of 75-97 beats per minute.  A followup test 17 minutes later   noted continued atrial fibrillation at a ventricular rate range of 67-97 beats per minute.  Another followup test at 09:32 that morning showed sinus rhythm with PACs with a rate range of 72-74 beats per minute.  There were no other triggered events.    SUMMARY:  In summary, this event monitor is consistent with paroxysmal atrial fibrillation without rapid ventricular conduction.  Clinical  "correlation is advised.      HPI:   Does not measure BP at home.   Occasional exercise.   No chest pain, Orthopnea, PND of heart failure symptoms.   Denies palpitations or fluttering in the chest    Patient Active Problem List   Diagnosis    Embolic stroke involving right middle cerebral artery    Rapid heart beat    Cavernoma    Aphasia    Expressive aphasia    Fibromuscular dysplasia    Cytotoxic cerebral edema    Paroxysmal atrial fibrillation    Atrial fibrillation     /73 (BP Location: Left arm, Patient Position: Sitting, BP Method: Large (Automatic))   Pulse 74   Ht 6' 3" (1.905 m)   Wt 96.6 kg (212 lb 15.4 oz)   BMI 26.62 kg/m²   Body mass index is 26.62 kg/m².  CrCl cannot be calculated (Patient's most recent lab result is older than the maximum 7 days allowed.).    Lab Results   Component Value Date     04/03/2018    K 4.6 04/03/2018     04/03/2018    CO2 28 04/03/2018    BUN 13 04/03/2018    CREATININE 1.2 04/03/2018    GLU 92 04/03/2018    MG 1.9 08/05/2017    AST 17 08/05/2017    ALT 15 08/05/2017    ALBUMIN 3.4 (L) 08/05/2017    PROT 6.2 08/05/2017    BILITOT 0.7 08/05/2017    WBC 4.52 04/03/2018    HGB 14.7 04/03/2018    HCT 42.0 04/03/2018    MCV 80 (L) 04/03/2018     04/03/2018    INR 1.1 04/03/2018    TSH 2.278 08/04/2017    CHOL 202 (H) 08/04/2017    HDL 50 08/04/2017    LDLCALC 89.0 08/04/2017    TRIG 315 (H) 08/04/2017       Current Outpatient Medications   Medication Sig    amLODIPine (NORVASC) 10 MG tablet TAKE HALF A TABLET BY MOUTH DAILY    atenolol (TENORMIN) 50 MG tablet Take 50 mg by mouth once daily.    rivaroxaban (XARELTO) 20 mg Tab Take 1 tablet (20 mg total) by mouth daily with dinner or evening meal.    rosuvastatin (CRESTOR) 40 MG Tab Take 1 tablet (40 mg total) by mouth every evening.    triamcinolone acetonide 0.1% (KENALOG) 0.1 % cream AAA BID x 1-2 wks then prn flares only    triamcinolone acetonide 0.1% (KENALOG) 0.1 % ointment APPLY " TO AFFECTED AREA TWICE A DAY FOR 1-2 WKS THEN AS NEEDED FLARES ONLY     No current facility-administered medications for this visit.        Review of Systems   Constitution: Negative.   HENT: Negative.    Eyes: Negative.    Cardiovascular: Negative for chest pain, dyspnea on exertion, leg swelling, near-syncope and syncope.   Respiratory: Negative.  Negative for shortness of breath.    Endocrine: Negative.    Hematologic/Lymphatic: Negative.    Skin: Negative.    Musculoskeletal: Negative.    Gastrointestinal: Negative.    Genitourinary: Negative.    Neurological: Negative.  Negative for dizziness and light-headedness.   Psychiatric/Behavioral: Negative.    Allergic/Immunologic: Negative.        Objective:   Physical Exam   Constitutional: He is oriented to person, place, and time. He appears well-developed and well-nourished. No distress.   HENT:   Head: Normocephalic and atraumatic.   Nose: Nose normal.   Mouth/Throat: No oropharyngeal exudate.   Eyes: Conjunctivae and EOM are normal. Pupils are equal, round, and reactive to light. Right eye exhibits no discharge. Left eye exhibits no discharge. No scleral icterus.   Neck: Normal range of motion. Neck supple. No JVD present. No tracheal deviation present. No thyromegaly present.   Cardiovascular: Normal rate, regular rhythm, normal heart sounds and intact distal pulses. Exam reveals no gallop and no friction rub.   No murmur heard.  Pulmonary/Chest: Effort normal and breath sounds normal. No stridor. No respiratory distress. He has no wheezes. He has no rales. He exhibits no tenderness.   Abdominal: Soft. Bowel sounds are normal. He exhibits no distension and no mass. There is no tenderness.   Musculoskeletal: He exhibits no edema or tenderness.   Lymphadenopathy:     He has no cervical adenopathy.   Neurological: He is alert and oriented to person, place, and time. He displays normal reflexes. No cranial nerve deficit. He exhibits normal muscle tone. Coordination  normal.   Skin: Skin is warm. No rash noted. He is not diaphoretic. No erythema. No pallor.   Psychiatric: He has a normal mood and affect. His behavior is normal. Judgment and thought content normal.       Assessment:     1. Paroxysmal atrial fibrillation    2. Stroke due to thrombosis of right middle cerebral artery    3. Status post radiofrequency ablation (RFA) operation for arrhythmia    4. Essential hypertension        Plan:   Doing well no change in meds.  Reji was seen today for paf (paroxysmal atrial fibrillation).    Diagnoses and all orders for this visit:    Paroxysmal atrial fibrillation  -     IN OFFICE EKG 12-LEAD (to Muse)    Stroke due to thrombosis of right middle cerebral artery    Status post radiofrequency ablation (RFA) operation for arrhythmia    Essential hypertension      RTC 1 yr.

## 2018-11-19 ENCOUNTER — HOSPITAL ENCOUNTER (OUTPATIENT)
Dept: CARDIOLOGY | Facility: CLINIC | Age: 63
Discharge: HOME | End: 2018-11-19
Payer: COMMERCIAL

## 2018-11-19 PROCEDURE — 93000 ELECTROCARDIOGRAM COMPLETE: CPT | Mod: S$GLB,,, | Performed by: INTERNAL MEDICINE

## 2018-12-21 DIAGNOSIS — Z95.818 PRESENCE OF CARDIAC DEVICE: Primary | ICD-10-CM

## 2018-12-25 DIAGNOSIS — I48.0 PAROXYSMAL A-FIB: ICD-10-CM

## 2018-12-25 RX ORDER — RIVAROXABAN 20 MG/1
TABLET, FILM COATED ORAL
Qty: 90 TABLET | Refills: 3 | Status: SHIPPED | OUTPATIENT
Start: 2018-12-25 | End: 2019-11-11 | Stop reason: SDUPTHER

## 2018-12-25 RX ORDER — ROSUVASTATIN CALCIUM 40 MG/1
TABLET, COATED ORAL
Qty: 90 TABLET | Refills: 3 | Status: SHIPPED | OUTPATIENT
Start: 2018-12-25 | End: 2019-11-11 | Stop reason: SDUPTHER

## 2018-12-27 DIAGNOSIS — Z95.818 STATUS POST PLACEMENT OF IMPLANTABLE LOOP RECORDER: Primary | ICD-10-CM

## 2018-12-28 ENCOUNTER — CLINICAL SUPPORT (OUTPATIENT)
Dept: CARDIOLOGY | Facility: HOSPITAL | Age: 63
End: 2018-12-28
Attending: INTERNAL MEDICINE
Payer: COMMERCIAL

## 2018-12-28 DIAGNOSIS — Z95.818 STATUS POST PLACEMENT OF IMPLANTABLE LOOP RECORDER: ICD-10-CM

## 2018-12-28 PROCEDURE — 93298 REM INTERROG DEV EVAL SCRMS: CPT | Mod: ,,, | Performed by: INTERNAL MEDICINE

## 2018-12-28 PROCEDURE — 93299 CARDIAC DEVICE CHECK - REMOTE: CPT

## 2019-01-03 ENCOUNTER — CLINICAL SUPPORT (OUTPATIENT)
Dept: CARDIOLOGY | Facility: HOSPITAL | Age: 64
End: 2019-01-03
Attending: INTERNAL MEDICINE
Payer: COMMERCIAL

## 2019-01-03 DIAGNOSIS — Z46.9 FITTING AND ADJUSTMENT OF DEVICE: ICD-10-CM

## 2019-01-03 PROCEDURE — 93299 CARDIAC DEVICE CHECK - REMOTE: CPT

## 2019-01-22 DIAGNOSIS — Z46.9 FITTING AND ADJUSTMENT OF DEVICE: Primary | ICD-10-CM

## 2019-02-15 ENCOUNTER — CLINICAL SUPPORT (OUTPATIENT)
Dept: CARDIOLOGY | Facility: HOSPITAL | Age: 64
End: 2019-02-15
Attending: INTERNAL MEDICINE
Payer: COMMERCIAL

## 2019-02-15 DIAGNOSIS — Z46.9 FITTING AND ADJUSTMENT OF DEVICE: ICD-10-CM

## 2019-02-15 PROCEDURE — 93299 CARDIAC DEVICE CHECK - REMOTE: CPT

## 2019-02-18 DIAGNOSIS — Z46.9 FITTING AND ADJUSTMENT OF DEVICE: Primary | ICD-10-CM

## 2019-03-12 ENCOUNTER — CLINICAL SUPPORT (OUTPATIENT)
Dept: CARDIOLOGY | Facility: HOSPITAL | Age: 64
End: 2019-03-12
Attending: INTERNAL MEDICINE
Payer: COMMERCIAL

## 2019-03-12 DIAGNOSIS — Z46.9 FITTING AND ADJUSTMENT OF DEVICE: ICD-10-CM

## 2019-03-12 PROCEDURE — 93299 CARDIAC DEVICE CHECK - REMOTE: CPT

## 2019-04-03 ENCOUNTER — RESEARCH ENCOUNTER (OUTPATIENT)
Dept: RESEARCH | Facility: HOSPITAL | Age: 64
End: 2019-04-03

## 2019-04-03 NOTE — PROGRESS NOTES
Study: Valerie  Sponsor: [employer]  Follow-up Visit: 12-Month FINAL visit  Date of Visit: [id number]    Patient wishes to continue in study: Yes  All study protocol required CRFs completed: Yes    Patient assessment done according to protocol: YES   Drug / Device / Intervention education continued.   Patient expresses willingness to continue in study: YES      Patient presented for 12 month post procedure visit, no complaints, palpitations, medication changes or procedure related adverse events.    48-Holter monitor placed; Patient was provided with a pre-labeled [employer]-Ex envelope for Holter monitor return;EQ-5D completed per protocol.    Informed patient that this was his final research visit; he expressed understanding. All questions were answered to patients satisfaction, instructed to call with any questions. Study complete.

## 2019-04-08 ENCOUNTER — CLINICAL SUPPORT (OUTPATIENT)
Dept: CARDIOLOGY | Facility: HOSPITAL | Age: 64
End: 2019-04-08
Attending: INTERNAL MEDICINE
Payer: COMMERCIAL

## 2019-04-08 DIAGNOSIS — Z95.818 PRESENCE OF CARDIAC DEVICE: ICD-10-CM

## 2019-04-08 PROCEDURE — 93299 CARDIAC DEVICE CHECK - REMOTE: CPT

## 2019-05-07 ENCOUNTER — CLINICAL SUPPORT (OUTPATIENT)
Dept: CARDIOLOGY | Facility: HOSPITAL | Age: 64
End: 2019-05-07
Attending: INTERNAL MEDICINE
Payer: COMMERCIAL

## 2019-05-07 DIAGNOSIS — Z95.818 PRESENCE OF CARDIAC DEVICE: ICD-10-CM

## 2019-05-07 PROCEDURE — 93299 CARDIAC DEVICE CHECK - REMOTE: CPT

## 2019-06-10 ENCOUNTER — CLINICAL SUPPORT (OUTPATIENT)
Dept: CARDIOLOGY | Facility: HOSPITAL | Age: 64
End: 2019-06-10
Attending: INTERNAL MEDICINE
Payer: COMMERCIAL

## 2019-06-10 DIAGNOSIS — Z95.818 PRESENCE OF CARDIAC DEVICE: ICD-10-CM

## 2019-06-10 PROCEDURE — 93299 CARDIAC DEVICE CHECK - REMOTE: CPT

## 2019-07-09 ENCOUNTER — CLINICAL SUPPORT (OUTPATIENT)
Dept: CARDIOLOGY | Facility: HOSPITAL | Age: 64
End: 2019-07-09
Attending: INTERNAL MEDICINE
Payer: COMMERCIAL

## 2019-07-09 DIAGNOSIS — Z95.818 PRESENCE OF CARDIAC DEVICE: ICD-10-CM

## 2019-07-09 PROCEDURE — 93299 CARDIAC DEVICE CHECK - REMOTE: CPT

## 2019-08-13 ENCOUNTER — PATIENT MESSAGE (OUTPATIENT)
Dept: CARDIOLOGY | Facility: CLINIC | Age: 64
End: 2019-08-13

## 2019-08-14 RX ORDER — ATENOLOL 50 MG/1
50 TABLET ORAL DAILY
Qty: 30 TABLET | Refills: 6 | Status: SHIPPED | OUTPATIENT
Start: 2019-08-14 | End: 2020-01-24

## 2019-08-20 ENCOUNTER — CLINICAL SUPPORT (OUTPATIENT)
Dept: CARDIOLOGY | Facility: HOSPITAL | Age: 64
End: 2019-08-20
Payer: COMMERCIAL

## 2019-08-20 PROCEDURE — 93298 CARDIAC DEVICE CHECK - REMOTE: ICD-10-PCS | Mod: ,,, | Performed by: INTERNAL MEDICINE

## 2019-08-20 PROCEDURE — 93299 CARDIAC DEVICE CHECK - REMOTE: CPT | Performed by: INTERNAL MEDICINE

## 2019-08-20 PROCEDURE — 93298 REM INTERROG DEV EVAL SCRMS: CPT | Mod: ,,, | Performed by: INTERNAL MEDICINE

## 2019-08-26 DIAGNOSIS — L29.9 PRURITUS: ICD-10-CM

## 2019-08-28 RX ORDER — TRIAMCINOLONE ACETONIDE 1 MG/G
CREAM TOPICAL
Qty: 454 G | Refills: 0 | Status: ON HOLD | OUTPATIENT
Start: 2019-08-28 | End: 2023-06-20 | Stop reason: HOSPADM

## 2019-09-04 RX ORDER — AMLODIPINE BESYLATE 10 MG/1
TABLET ORAL
Qty: 45 TABLET | Refills: 3 | Status: SHIPPED | OUTPATIENT
Start: 2019-09-04 | End: 2021-01-06 | Stop reason: SDUPTHER

## 2019-09-19 ENCOUNTER — CLINICAL SUPPORT (OUTPATIENT)
Dept: CARDIOLOGY | Facility: HOSPITAL | Age: 64
End: 2019-09-19
Payer: COMMERCIAL

## 2019-09-19 DIAGNOSIS — Z95.818 PRESENCE OF CARDIAC DEVICE: ICD-10-CM

## 2019-09-19 PROCEDURE — 93299 CARDIAC DEVICE CHECK - REMOTE: CPT | Performed by: INTERNAL MEDICINE

## 2019-09-19 PROCEDURE — 93298 REM INTERROG DEV EVAL SCRMS: CPT | Mod: ,,, | Performed by: INTERNAL MEDICINE

## 2019-09-19 PROCEDURE — 93298 CARDIAC DEVICE CHECK - REMOTE: ICD-10-PCS | Mod: ,,, | Performed by: INTERNAL MEDICINE

## 2019-10-19 ENCOUNTER — CLINICAL SUPPORT (OUTPATIENT)
Dept: CARDIOLOGY | Facility: HOSPITAL | Age: 64
End: 2019-10-19
Attending: INTERNAL MEDICINE
Payer: COMMERCIAL

## 2019-10-19 DIAGNOSIS — Z95.818 PRESENCE OF CARDIAC DEVICE: ICD-10-CM

## 2019-10-19 PROCEDURE — 93298 CARDIAC DEVICE CHECK - REMOTE: ICD-10-PCS | Mod: ,,, | Performed by: INTERNAL MEDICINE

## 2019-10-19 PROCEDURE — 93299 CARDIAC DEVICE CHECK - REMOTE: CPT | Performed by: INTERNAL MEDICINE

## 2019-10-19 PROCEDURE — 93298 REM INTERROG DEV EVAL SCRMS: CPT | Mod: ,,, | Performed by: INTERNAL MEDICINE

## 2019-11-11 DIAGNOSIS — I48.0 PAROXYSMAL A-FIB: ICD-10-CM

## 2019-11-12 DIAGNOSIS — I48.0 PAROXYSMAL A-FIB: ICD-10-CM

## 2019-11-12 RX ORDER — RIVAROXABAN 20 MG/1
TABLET, FILM COATED ORAL
Qty: 90 TABLET | Refills: 3 | Status: SHIPPED | OUTPATIENT
Start: 2019-11-12 | End: 2019-11-12 | Stop reason: SDUPTHER

## 2019-11-12 RX ORDER — ROSUVASTATIN CALCIUM 40 MG/1
TABLET, COATED ORAL
Qty: 90 TABLET | Refills: 3 | Status: SHIPPED | OUTPATIENT
Start: 2019-11-12 | End: 2019-11-12 | Stop reason: SDUPTHER

## 2019-11-13 RX ORDER — RIVAROXABAN 20 MG/1
TABLET, FILM COATED ORAL
Qty: 90 TABLET | Refills: 3 | Status: SHIPPED | OUTPATIENT
Start: 2019-11-13 | End: 2021-01-06 | Stop reason: SDUPTHER

## 2019-11-13 RX ORDER — ROSUVASTATIN CALCIUM 40 MG/1
TABLET, COATED ORAL
Qty: 90 TABLET | Refills: 3 | Status: SHIPPED | OUTPATIENT
Start: 2019-11-13 | End: 2021-01-06 | Stop reason: SDUPTHER

## 2019-11-18 ENCOUNTER — CLINICAL SUPPORT (OUTPATIENT)
Dept: CARDIOLOGY | Facility: HOSPITAL | Age: 64
End: 2019-11-18
Payer: COMMERCIAL

## 2019-11-18 DIAGNOSIS — Z95.818 PRESENCE OF OTHER CARDIAC IMPLANTS AND GRAFTS: ICD-10-CM

## 2019-11-18 PROCEDURE — 93298 REM INTERROG DEV EVAL SCRMS: CPT | Mod: ,,, | Performed by: INTERNAL MEDICINE

## 2019-11-18 PROCEDURE — 93298 CARDIAC DEVICE CHECK - REMOTE: ICD-10-PCS | Mod: ,,, | Performed by: INTERNAL MEDICINE

## 2019-12-18 ENCOUNTER — CLINICAL SUPPORT (OUTPATIENT)
Dept: CARDIOLOGY | Facility: HOSPITAL | Age: 64
End: 2019-12-18
Payer: COMMERCIAL

## 2019-12-18 DIAGNOSIS — Z95.818 PRESENCE OF OTHER CARDIAC IMPLANTS AND GRAFTS: ICD-10-CM

## 2019-12-18 PROCEDURE — 93298 REM INTERROG DEV EVAL SCRMS: CPT | Mod: ,,, | Performed by: INTERNAL MEDICINE

## 2019-12-18 PROCEDURE — 93298 CARDIAC DEVICE CHECK - REMOTE: ICD-10-PCS | Mod: ,,, | Performed by: INTERNAL MEDICINE

## 2019-12-28 DIAGNOSIS — L29.9 PRURITUS: ICD-10-CM

## 2019-12-30 RX ORDER — TRIAMCINOLONE ACETONIDE 1 MG/G
CREAM TOPICAL
Qty: 454 G | Refills: 0 | OUTPATIENT
Start: 2019-12-30

## 2020-01-17 ENCOUNTER — CLINICAL SUPPORT (OUTPATIENT)
Dept: CARDIOLOGY | Facility: HOSPITAL | Age: 65
End: 2020-01-17
Attending: INTERNAL MEDICINE
Payer: COMMERCIAL

## 2020-01-17 DIAGNOSIS — Z95.818 STATUS POST PLACEMENT OF IMPLANTABLE LOOP RECORDER: ICD-10-CM

## 2020-01-17 PROCEDURE — G2066 INTER DEVC REMOTE 30D: HCPCS | Performed by: INTERNAL MEDICINE

## 2020-01-17 PROCEDURE — 93298 REM INTERROG DEV EVAL SCRMS: CPT | Mod: ,,, | Performed by: INTERNAL MEDICINE

## 2020-01-17 PROCEDURE — 93298 CARDIAC DEVICE CHECK - REMOTE: ICD-10-PCS | Mod: ,,, | Performed by: INTERNAL MEDICINE

## 2020-01-24 RX ORDER — ATENOLOL 50 MG/1
TABLET ORAL
Qty: 90 TABLET | Refills: 2 | Status: SHIPPED | OUTPATIENT
Start: 2020-01-24 | End: 2020-10-14

## 2020-02-16 ENCOUNTER — CLINICAL SUPPORT (OUTPATIENT)
Dept: CARDIOLOGY | Facility: HOSPITAL | Age: 65
End: 2020-02-16
Payer: COMMERCIAL

## 2020-02-16 DIAGNOSIS — Z95.818 PRESENCE OF OTHER CARDIAC IMPLANTS AND GRAFTS: ICD-10-CM

## 2020-02-16 PROCEDURE — G2066 INTER DEVC REMOTE 30D: HCPCS | Performed by: INTERNAL MEDICINE

## 2020-02-16 PROCEDURE — 93298 CARDIAC DEVICE CHECK - REMOTE: ICD-10-PCS | Mod: ,,, | Performed by: INTERNAL MEDICINE

## 2020-02-16 PROCEDURE — 93298 REM INTERROG DEV EVAL SCRMS: CPT | Mod: ,,, | Performed by: INTERNAL MEDICINE

## 2020-03-17 ENCOUNTER — CLINICAL SUPPORT (OUTPATIENT)
Dept: CARDIOLOGY | Facility: HOSPITAL | Age: 65
End: 2020-03-17
Payer: COMMERCIAL

## 2020-03-17 DIAGNOSIS — Z95.818 PRESENCE OF OTHER CARDIAC IMPLANTS AND GRAFTS: ICD-10-CM

## 2020-03-17 PROCEDURE — G2066 INTER DEVC REMOTE 30D: HCPCS | Performed by: INTERNAL MEDICINE

## 2020-03-17 PROCEDURE — 93298 REM INTERROG DEV EVAL SCRMS: CPT | Mod: ,,, | Performed by: INTERNAL MEDICINE

## 2020-03-17 PROCEDURE — 93298 CARDIAC DEVICE CHECK - REMOTE: ICD-10-PCS | Mod: ,,, | Performed by: INTERNAL MEDICINE

## 2020-04-16 ENCOUNTER — CLINICAL SUPPORT (OUTPATIENT)
Dept: CARDIOLOGY | Facility: HOSPITAL | Age: 65
End: 2020-04-16
Attending: INTERNAL MEDICINE
Payer: COMMERCIAL

## 2020-04-16 DIAGNOSIS — Z95.818 STATUS POST PLACEMENT OF IMPLANTABLE LOOP RECORDER: ICD-10-CM

## 2020-04-16 PROCEDURE — 93298 REM INTERROG DEV EVAL SCRMS: CPT | Mod: ,,, | Performed by: INTERNAL MEDICINE

## 2020-04-16 PROCEDURE — G2066 INTER DEVC REMOTE 30D: HCPCS | Performed by: INTERNAL MEDICINE

## 2020-04-16 PROCEDURE — 93298 CARDIAC DEVICE CHECK - REMOTE: ICD-10-PCS | Mod: ,,, | Performed by: INTERNAL MEDICINE

## 2020-05-25 ENCOUNTER — TELEPHONE (OUTPATIENT)
Dept: ELECTROPHYSIOLOGY | Facility: CLINIC | Age: 65
End: 2020-05-25

## 2020-05-25 NOTE — TELEPHONE ENCOUNTER
Spoke with patient who states he is having trouble with the nicki connecting. States he has called [employer] but was connected to  with no response.. This was osculated to Zenaida Wade.

## 2020-07-07 ENCOUNTER — TELEPHONE (OUTPATIENT)
Dept: CARDIOLOGY | Facility: HOSPITAL | Age: 65
End: 2020-07-07

## 2020-07-07 NOTE — TELEPHONE ENCOUNTER
----- Message from Jose Angel Montes MD sent at 7/6/2020  3:15 PM CDT -----  I would ask the patient if he wants it removed. If so we can schedule it thanks.   ----- Message -----  From: Kamini Reis RN  Sent: 7/6/2020   1:47 PM CDT  To: Shawn Soriano, Jose Angel Montes MD    Pt has a [facility] ILR implanted for monitoring post AF ablation. He was noted to have a low battery on his documented remote check on 3/31/2020. Homero had sent you a msg regarding the battery status. I believe nothing was done at that time due to COVID. Since then he's been having trouble connecting to the nicki on his phone. He contacted [facility] and it appears that his ILR is now completely dead. Wound you like to schedule a removal and reimplant or does he no longer need ILR monitoring?

## 2020-07-07 NOTE — TELEPHONE ENCOUNTER
----- Message from Jose Angel Montes MD sent at 7/6/2020  3:15 PM CDT -----  I would ask the patient if he wants it removed. If so we can schedule it thanks.   ----- Message -----  From: Kamini Reis RN  Sent: 7/6/2020   1:47 PM CDT  To: Shawn Soriano, Jose Angel Montes MD    Pt has a [facility] ILR implanted for monitoring post AF ablation. He was noted to have a low battery on his documented remote check on 3/31/2020. Homero had sent you a msg regarding the battery status. I believe nothing was done at that time due to COVID. Since then he's been having trouble connecting to the nicki on his phone. He contacted [facility] and it appears that his ILR is now completely dead. Wound you like to schedule a removal and reimplant or does he no longer need ILR monitoring?

## 2020-07-07 NOTE — TELEPHONE ENCOUNTER
Pt contacted [facility] on this afternoon in relation to his ILR.  (please see attached telephone message).  Pt's ILR has reached EOS and he has elected to have it removed.  Informed the pt a message would be sent to Dr. Montes's RN and she will contact him to schedule the procedure.  Pt asked if the procedure could be done this week.  Informed the pt more than likely it would Not be able to be done this week.  Pt verbalized understanding.    Message sent to Dr. Montes's RN.

## 2021-01-06 ENCOUNTER — OFFICE VISIT (OUTPATIENT)
Dept: CARDIOLOGY | Facility: CLINIC | Age: 66
End: 2021-01-06
Payer: COMMERCIAL

## 2021-01-06 ENCOUNTER — LAB VISIT (OUTPATIENT)
Dept: LAB | Facility: HOSPITAL | Age: 66
End: 2021-01-06
Attending: INTERNAL MEDICINE
Payer: COMMERCIAL

## 2021-01-06 VITALS
WEIGHT: 205 LBS | HEIGHT: 75 IN | DIASTOLIC BLOOD PRESSURE: 76 MMHG | BODY MASS INDEX: 25.49 KG/M2 | HEART RATE: 83 BPM | SYSTOLIC BLOOD PRESSURE: 114 MMHG

## 2021-01-06 DIAGNOSIS — E78.00 HYPERCHOLESTEREMIA: Primary | ICD-10-CM

## 2021-01-06 DIAGNOSIS — Z95.818 STATUS POST PLACEMENT OF IMPLANTABLE LOOP RECORDER: ICD-10-CM

## 2021-01-06 DIAGNOSIS — I48.0 PAROXYSMAL A-FIB: ICD-10-CM

## 2021-01-06 DIAGNOSIS — I10 ESSENTIAL HYPERTENSION: ICD-10-CM

## 2021-01-06 DIAGNOSIS — E78.00 HYPERCHOLESTEREMIA: ICD-10-CM

## 2021-01-06 LAB
ALBUMIN SERPL BCP-MCNC: 4 G/DL (ref 3.5–5.2)
ALP SERPL-CCNC: 69 U/L (ref 55–135)
ALT SERPL W/O P-5'-P-CCNC: 20 U/L (ref 10–44)
ANION GAP SERPL CALC-SCNC: 6 MMOL/L (ref 8–16)
AST SERPL-CCNC: 25 U/L (ref 10–40)
BASOPHILS # BLD AUTO: 0.05 K/UL (ref 0–0.2)
BASOPHILS NFR BLD: 1.2 % (ref 0–1.9)
BILIRUB SERPL-MCNC: 0.6 MG/DL (ref 0.1–1)
BUN SERPL-MCNC: 14 MG/DL (ref 8–23)
CALCIUM SERPL-MCNC: 9.5 MG/DL (ref 8.7–10.5)
CHLORIDE SERPL-SCNC: 105 MMOL/L (ref 95–110)
CHOLEST SERPL-MCNC: 129 MG/DL (ref 120–199)
CHOLEST/HDLC SERPL: 2.3 {RATIO} (ref 2–5)
CO2 SERPL-SCNC: 28 MMOL/L (ref 23–29)
CREAT SERPL-MCNC: 1.2 MG/DL (ref 0.5–1.4)
DIFFERENTIAL METHOD: NORMAL
EOSINOPHIL # BLD AUTO: 0.3 K/UL (ref 0–0.5)
EOSINOPHIL NFR BLD: 7.7 % (ref 0–8)
ERYTHROCYTE [DISTWIDTH] IN BLOOD BY AUTOMATED COUNT: 13.3 % (ref 11.5–14.5)
EST. GFR  (AFRICAN AMERICAN): >60 ML/MIN/1.73 M^2
EST. GFR  (NON AFRICAN AMERICAN): >60 ML/MIN/1.73 M^2
GLUCOSE SERPL-MCNC: 102 MG/DL (ref 70–110)
HCT VFR BLD AUTO: 44.6 % (ref 40–54)
HDLC SERPL-MCNC: 56 MG/DL (ref 40–75)
HDLC SERPL: 43.4 % (ref 20–50)
HGB BLD-MCNC: 14.9 G/DL (ref 14–18)
IMM GRANULOCYTES # BLD AUTO: 0.01 K/UL (ref 0–0.04)
IMM GRANULOCYTES NFR BLD AUTO: 0.2 % (ref 0–0.5)
LDLC SERPL CALC-MCNC: 58.6 MG/DL (ref 63–159)
LYMPHOCYTES # BLD AUTO: 1.4 K/UL (ref 1–4.8)
LYMPHOCYTES NFR BLD: 33.6 % (ref 18–48)
MCH RBC QN AUTO: 27.6 PG (ref 27–31)
MCHC RBC AUTO-ENTMCNC: 33.4 G/DL (ref 32–36)
MCV RBC AUTO: 83 FL (ref 82–98)
MONOCYTES # BLD AUTO: 0.4 K/UL (ref 0.3–1)
MONOCYTES NFR BLD: 9.2 % (ref 4–15)
NEUTROPHILS # BLD AUTO: 2 K/UL (ref 1.8–7.7)
NEUTROPHILS NFR BLD: 48.1 % (ref 38–73)
NONHDLC SERPL-MCNC: 73 MG/DL
NRBC BLD-RTO: 0 /100 WBC
PLATELET # BLD AUTO: 211 K/UL (ref 150–350)
PLATELET BLD QL SMEAR: NORMAL
PMV BLD AUTO: 10.7 FL (ref 9.2–12.9)
POTASSIUM SERPL-SCNC: 4.5 MMOL/L (ref 3.5–5.1)
PROT SERPL-MCNC: 7.3 G/DL (ref 6–8.4)
RBC # BLD AUTO: 5.39 M/UL (ref 4.6–6.2)
SODIUM SERPL-SCNC: 139 MMOL/L (ref 136–145)
TRIGL SERPL-MCNC: 72 MG/DL (ref 30–150)
WBC # BLD AUTO: 4.14 K/UL (ref 3.9–12.7)

## 2021-01-06 PROCEDURE — 99999 PR PBB SHADOW E&M-EST. PATIENT-LVL III: ICD-10-PCS | Mod: PBBFAC,,, | Performed by: INTERNAL MEDICINE

## 2021-01-06 PROCEDURE — 85025 COMPLETE CBC W/AUTO DIFF WBC: CPT

## 2021-01-06 PROCEDURE — 99214 OFFICE O/P EST MOD 30 MIN: CPT | Mod: S$GLB,,, | Performed by: INTERNAL MEDICINE

## 2021-01-06 PROCEDURE — 99999 PR PBB SHADOW E&M-EST. PATIENT-LVL III: CPT | Mod: PBBFAC,,, | Performed by: INTERNAL MEDICINE

## 2021-01-06 PROCEDURE — 80061 LIPID PANEL: CPT

## 2021-01-06 PROCEDURE — 36415 COLL VENOUS BLD VENIPUNCTURE: CPT

## 2021-01-06 PROCEDURE — 80053 COMPREHEN METABOLIC PANEL: CPT

## 2021-01-06 PROCEDURE — 99214 PR OFFICE/OUTPT VISIT, EST, LEVL IV, 30-39 MIN: ICD-10-PCS | Mod: S$GLB,,, | Performed by: INTERNAL MEDICINE

## 2021-01-06 RX ORDER — ATENOLOL 50 MG/1
50 TABLET ORAL DAILY
Qty: 90 TABLET | Refills: 2 | Status: SHIPPED | OUTPATIENT
Start: 2021-01-06 | End: 2021-12-06

## 2021-01-06 RX ORDER — AMLODIPINE BESYLATE 10 MG/1
TABLET ORAL
Qty: 45 TABLET | Refills: 3 | Status: SHIPPED | OUTPATIENT
Start: 2021-01-06 | End: 2022-01-14

## 2021-01-06 RX ORDER — ROSUVASTATIN CALCIUM 40 MG/1
40 TABLET, COATED ORAL NIGHTLY
Qty: 90 TABLET | Refills: 3 | Status: SHIPPED | OUTPATIENT
Start: 2021-01-06 | End: 2022-01-14

## 2021-01-06 RX ORDER — RIVAROXABAN 20 MG/1
TABLET, FILM COATED ORAL
Qty: 90 TABLET | Refills: 3 | Status: SHIPPED | OUTPATIENT
Start: 2021-01-06 | End: 2022-01-14

## 2021-01-11 ENCOUNTER — TELEPHONE (OUTPATIENT)
Dept: CARDIOLOGY | Facility: CLINIC | Age: 66
End: 2021-01-11

## 2021-01-13 ENCOUNTER — HOSPITAL ENCOUNTER (OUTPATIENT)
Dept: RADIOLOGY | Facility: HOSPITAL | Age: 66
Discharge: HOME | End: 2021-01-13
Attending: INTERNAL MEDICINE
Payer: COMMERCIAL

## 2021-01-13 DIAGNOSIS — I10 ESSENTIAL HYPERTENSION: ICD-10-CM

## 2021-01-13 PROCEDURE — 75571 CT CALCIUM SCORING CARDIAC: ICD-10-PCS | Mod: 26,,, | Performed by: RADIOLOGY

## 2021-01-13 PROCEDURE — 75571 CT HRT W/O DYE W/CA TEST: CPT | Mod: 26,,, | Performed by: RADIOLOGY

## 2021-01-13 PROCEDURE — 75571 CT HRT W/O DYE W/CA TEST: CPT | Mod: TC

## 2021-01-22 ENCOUNTER — OFFICE VISIT (OUTPATIENT)
Dept: DERMATOLOGY | Facility: CLINIC | Age: 66
End: 2021-01-22
Payer: COMMERCIAL

## 2021-01-22 DIAGNOSIS — L29.9 PRURITUS: Primary | ICD-10-CM

## 2021-01-22 PROCEDURE — 99999 PR PBB SHADOW E&M-EST. PATIENT-LVL III: ICD-10-PCS | Mod: PBBFAC,,, | Performed by: DERMATOLOGY

## 2021-01-22 PROCEDURE — 99203 PR OFFICE/OUTPT VISIT, NEW, LEVL III, 30-44 MIN: ICD-10-PCS | Mod: S$GLB,,, | Performed by: DERMATOLOGY

## 2021-01-22 PROCEDURE — 99999 PR PBB SHADOW E&M-EST. PATIENT-LVL III: CPT | Mod: PBBFAC,,, | Performed by: DERMATOLOGY

## 2021-01-22 PROCEDURE — 99203 OFFICE O/P NEW LOW 30 MIN: CPT | Mod: S$GLB,,, | Performed by: DERMATOLOGY

## 2021-01-22 RX ORDER — TRIAMCINOLONE ACETONIDE 40 MG/ML
40 INJECTION, SUSPENSION INTRA-ARTICULAR; INTRAMUSCULAR
Status: DISCONTINUED | OUTPATIENT
Start: 2021-01-22 | End: 2023-06-20 | Stop reason: HOSPADM

## 2021-01-22 RX ORDER — FLUOCINONIDE 0.5 MG/G
CREAM TOPICAL 2 TIMES DAILY
Qty: 120 G | Refills: 3 | Status: ON HOLD | OUTPATIENT
Start: 2021-01-22 | End: 2023-06-20 | Stop reason: HOSPADM

## 2021-01-22 RX ORDER — HYDROXYZINE HYDROCHLORIDE 25 MG/1
25 TABLET, FILM COATED ORAL NIGHTLY PRN
Qty: 30 TABLET | Refills: 1 | Status: SHIPPED | OUTPATIENT
Start: 2021-01-22 | End: 2021-02-14

## 2021-01-22 RX ORDER — BETAMETHASONE SODIUM PHOSPHATE AND BETAMETHASONE ACETATE 3; 3 MG/ML; MG/ML
6 INJECTION, SUSPENSION INTRA-ARTICULAR; INTRALESIONAL; INTRAMUSCULAR; SOFT TISSUE
Status: DISCONTINUED | OUTPATIENT
Start: 2021-01-22 | End: 2023-06-20 | Stop reason: HOSPADM

## 2021-01-25 ENCOUNTER — TELEPHONE (OUTPATIENT)
Dept: CARDIOLOGY | Facility: CLINIC | Age: 66
End: 2021-01-25

## 2021-03-08 ENCOUNTER — OFFICE VISIT (OUTPATIENT)
Dept: CARDIOLOGY | Facility: CLINIC | Age: 66
End: 2021-03-08
Payer: COMMERCIAL

## 2021-03-08 VITALS
SYSTOLIC BLOOD PRESSURE: 126 MMHG | HEIGHT: 75 IN | HEART RATE: 80 BPM | WEIGHT: 212.75 LBS | BODY MASS INDEX: 26.45 KG/M2 | DIASTOLIC BLOOD PRESSURE: 71 MMHG

## 2021-03-08 DIAGNOSIS — I48.0 PAROXYSMAL A-FIB: ICD-10-CM

## 2021-03-08 DIAGNOSIS — Z95.818 STATUS POST PLACEMENT OF IMPLANTABLE LOOP RECORDER: ICD-10-CM

## 2021-03-08 DIAGNOSIS — I63.411 EMBOLIC STROKE INVOLVING RIGHT MIDDLE CEREBRAL ARTERY: ICD-10-CM

## 2021-03-08 DIAGNOSIS — I10 ESSENTIAL HYPERTENSION: Primary | ICD-10-CM

## 2021-03-08 PROCEDURE — 99999 PR PBB SHADOW E&M-EST. PATIENT-LVL IV: CPT | Mod: PBBFAC,,, | Performed by: INTERNAL MEDICINE

## 2021-03-08 PROCEDURE — 99214 PR OFFICE/OUTPT VISIT, EST, LEVL IV, 30-39 MIN: ICD-10-PCS | Mod: S$GLB,,, | Performed by: INTERNAL MEDICINE

## 2021-03-08 PROCEDURE — 99214 OFFICE O/P EST MOD 30 MIN: CPT | Mod: S$GLB,,, | Performed by: INTERNAL MEDICINE

## 2021-03-08 PROCEDURE — 99999 PR PBB SHADOW E&M-EST. PATIENT-LVL IV: ICD-10-PCS | Mod: PBBFAC,,, | Performed by: INTERNAL MEDICINE

## 2021-03-08 RX ORDER — NAPROXEN SODIUM 220 MG/1
81 TABLET, FILM COATED ORAL DAILY
Qty: 90 TABLET | Refills: 3 | Status: SHIPPED | OUTPATIENT
Start: 2021-03-08 | End: 2022-04-29

## 2021-03-12 DIAGNOSIS — L29.9 PRURITUS: ICD-10-CM

## 2021-03-12 RX ORDER — HYDROXYZINE HYDROCHLORIDE 25 MG/1
TABLET, FILM COATED ORAL
Qty: 30 TABLET | Refills: 1 | Status: SHIPPED | OUTPATIENT
Start: 2021-03-12 | End: 2021-04-06

## 2021-03-19 DIAGNOSIS — Z95.818 STATUS POST PLACEMENT OF IMPLANTABLE LOOP RECORDER: Primary | ICD-10-CM

## 2021-03-19 DIAGNOSIS — I48.91 ATRIAL FIBRILLATION, UNSPECIFIED TYPE: ICD-10-CM

## 2021-04-13 ENCOUNTER — PATIENT MESSAGE (OUTPATIENT)
Dept: RESEARCH | Facility: HOSPITAL | Age: 66
End: 2021-04-13

## 2021-05-07 ENCOUNTER — HOSPITAL ENCOUNTER (OUTPATIENT)
Dept: CARDIOLOGY | Facility: CLINIC | Age: 66
Discharge: HOME | End: 2021-05-07
Attending: INTERNAL MEDICINE
Payer: COMMERCIAL

## 2021-05-07 ENCOUNTER — OFFICE VISIT (OUTPATIENT)
Dept: ELECTROPHYSIOLOGY | Facility: CLINIC | Age: 66
End: 2021-05-07
Payer: COMMERCIAL

## 2021-05-07 ENCOUNTER — CLINICAL SUPPORT (OUTPATIENT)
Dept: CARDIOLOGY | Facility: HOSPITAL | Age: 66
End: 2021-05-07
Attending: INTERNAL MEDICINE
Payer: COMMERCIAL

## 2021-05-07 VITALS
DIASTOLIC BLOOD PRESSURE: 76 MMHG | SYSTOLIC BLOOD PRESSURE: 114 MMHG | HEIGHT: 75 IN | WEIGHT: 213.88 LBS | HEART RATE: 77 BPM | BODY MASS INDEX: 26.59 KG/M2

## 2021-05-07 DIAGNOSIS — I48.91 ATRIAL FIBRILLATION, UNSPECIFIED TYPE: ICD-10-CM

## 2021-05-07 DIAGNOSIS — I48.11 LONGSTANDING PERSISTENT ATRIAL FIBRILLATION: ICD-10-CM

## 2021-05-07 DIAGNOSIS — Z86.79 STATUS POST CRYOABLATION OF ARRHYTHMIA: ICD-10-CM

## 2021-05-07 DIAGNOSIS — Z79.01 ANTICOAGULANT LONG-TERM USE: ICD-10-CM

## 2021-05-07 DIAGNOSIS — Z98.890 STATUS POST CRYOABLATION OF ARRHYTHMIA: ICD-10-CM

## 2021-05-07 DIAGNOSIS — Z95.818 STATUS POST PLACEMENT OF IMPLANTABLE LOOP RECORDER: ICD-10-CM

## 2021-05-07 DIAGNOSIS — Z86.73 HISTORY OF CVA (CEREBROVASCULAR ACCIDENT): ICD-10-CM

## 2021-05-07 DIAGNOSIS — I48.0 PAROXYSMAL ATRIAL FIBRILLATION: Primary | ICD-10-CM

## 2021-05-07 PROCEDURE — 99999 PR PBB SHADOW E&M-EST. PATIENT-LVL IV: ICD-10-PCS | Mod: PBBFAC,,, | Performed by: NURSE PRACTITIONER

## 2021-05-07 PROCEDURE — 93010 ELECTROCARDIOGRAM REPORT: CPT | Mod: S$GLB,,, | Performed by: INTERNAL MEDICINE

## 2021-05-07 PROCEDURE — 99999 PR PBB SHADOW E&M-EST. PATIENT-LVL IV: CPT | Mod: PBBFAC,,, | Performed by: NURSE PRACTITIONER

## 2021-05-07 PROCEDURE — 93005 ELECTROCARDIOGRAM TRACING: CPT | Mod: S$GLB,,, | Performed by: INTERNAL MEDICINE

## 2021-05-07 PROCEDURE — 99214 PR OFFICE/OUTPT VISIT, EST, LEVL IV, 30-39 MIN: ICD-10-PCS | Mod: S$GLB,,, | Performed by: NURSE PRACTITIONER

## 2021-05-07 PROCEDURE — 93010 RHYTHM STRIP: ICD-10-PCS | Mod: S$GLB,,, | Performed by: INTERNAL MEDICINE

## 2021-05-07 PROCEDURE — 99214 OFFICE O/P EST MOD 30 MIN: CPT | Mod: S$GLB,,, | Performed by: NURSE PRACTITIONER

## 2021-05-07 PROCEDURE — 93005 RHYTHM STRIP: ICD-10-PCS | Mod: S$GLB,,, | Performed by: INTERNAL MEDICINE

## 2021-05-10 DIAGNOSIS — L29.9 PRURITUS: ICD-10-CM

## 2021-05-13 RX ORDER — HYDROXYZINE HYDROCHLORIDE 25 MG/1
TABLET, FILM COATED ORAL
Qty: 30 TABLET | Refills: 1 | Status: SHIPPED | OUTPATIENT
Start: 2021-05-13 | End: 2021-06-11

## 2021-05-17 ENCOUNTER — TELEPHONE (OUTPATIENT)
Dept: ELECTROPHYSIOLOGY | Facility: CLINIC | Age: 66
End: 2021-05-17

## 2021-07-12 DIAGNOSIS — L29.9 PRURITUS: ICD-10-CM

## 2021-07-16 DIAGNOSIS — L29.9 PRURITUS: ICD-10-CM

## 2021-07-16 RX ORDER — HYDROXYZINE HYDROCHLORIDE 25 MG/1
TABLET, FILM COATED ORAL
Qty: 30 TABLET | Refills: 1 | Status: SHIPPED | OUTPATIENT
Start: 2021-07-16 | End: 2021-08-11

## 2021-07-16 RX ORDER — HYDROXYZINE HYDROCHLORIDE 25 MG/1
TABLET, FILM COATED ORAL
Qty: 30 TABLET | Refills: 1 | OUTPATIENT
Start: 2021-07-16

## 2021-10-01 DIAGNOSIS — L29.9 PRURITUS: ICD-10-CM

## 2021-10-06 RX ORDER — HYDROXYZINE HYDROCHLORIDE 25 MG/1
TABLET, FILM COATED ORAL
Qty: 30 TABLET | Refills: 3 | Status: SHIPPED | OUTPATIENT
Start: 2021-10-06 | End: 2021-12-18 | Stop reason: SDUPTHER

## 2022-01-13 DIAGNOSIS — I48.0 PAROXYSMAL A-FIB: ICD-10-CM

## 2022-01-14 RX ORDER — ROSUVASTATIN CALCIUM 40 MG/1
TABLET, COATED ORAL
Qty: 90 TABLET | Refills: 3 | Status: SHIPPED | OUTPATIENT
Start: 2022-01-14 | End: 2023-02-06

## 2022-01-14 RX ORDER — AMLODIPINE BESYLATE 10 MG/1
TABLET ORAL
Qty: 45 TABLET | Refills: 3 | Status: SHIPPED | OUTPATIENT
Start: 2022-01-14 | End: 2023-02-06

## 2022-01-26 ENCOUNTER — IMMUNIZATION (OUTPATIENT)
Dept: PRIMARY CARE CLINIC | Facility: CLINIC | Age: 67
End: 2022-01-26
Payer: COMMERCIAL

## 2022-01-26 DIAGNOSIS — Z23 NEED FOR VACCINATION: Primary | ICD-10-CM

## 2022-01-26 PROCEDURE — 0064A COVID-19, MRNA, LNP-S, PF, 100 MCG/0.25 ML DOSE VACCINE (MODERNA BOOSTER): CPT | Mod: CV19,PBBFAC | Performed by: INTERNAL MEDICINE

## 2022-01-26 PROCEDURE — 91306 COVID-19, MRNA, LNP-S, PF, 100 MCG/0.25 ML DOSE VACCINE (MODERNA BOOSTER): CPT | Mod: PBBFAC | Performed by: INTERNAL MEDICINE

## 2022-10-26 NOTE — PT/OT/SLP EVAL
"Occupational Therapy  Evaluation/Discharge Summary    Reji Aleman   MRN: [id number]   Admitting Diagnosis: TIA  OT Date of Treatment: 08/05/17   OT Start Time: 0600  OT Stop Time: 0620  OT Total Time (min): 20 min    Billable Minutes:  Evaluation 10  Therapeutic Activity 10    Diagnosis: TIA    Past Medical History:   Diagnosis Date    Hypertension       History reviewed. No pertinent surgical history.    Referring physician: Dr. Palomino  Date referred to OT: 8/5  General Precautions: Standard, aspiration, fall  Orthopedic Precautions: N/A  Braces: N/A    Do you have any cultural, spiritual, Tenriism conflicts, given your current situation?: Jew     Patient History:  Prior level of function:   Patient resides in [place] alone (sister lives nearby) in one story home with 3 steps to enter, rail on the right.  PTA patient independent with ADLs including driving.  Patient is left handed.  Currently owns no DME.  Works:  .  Hobbies:  sports.  Roles/Responsibilities:  , brother, father, grandfather, cooking, grocery shopping, managing finances, cutting grass.     Subjective:  Communicated with nurse prior to session.  Patient:  "It lasted 30-40 seconds.  I couldn't talk and my fingertips were tingling."     Pain/Comfort  Pain Rating 1: 0/10  Pain Rating Post-Intervention 1: 0/10    Objective:  Patient found with: peripheral IV, telemetry  Family not present.    Cognitive Exam:  Oriented to: Person, Place, Time and Situation  Follows Commands/attention: Follows two-step commands  Communication: clear/fluent  Memory:  No Deficits noted  Safety awareness/insight to disability: intact  Coping skills/emotional control: Appropriate to situation    Visual/perceptual:  Intact    Physical Exam:  Postural examination/scapula alignment: Rounded shoulder  Skin integrity: Visible skin intact  Edema: None noted     Sensation:   Intact    Upper Extremity Range of Motion:  Right Upper Extremity: WNL  Left " "SUBJECTIVE:  Minnie Josaire Jeansonne is a 1 y.o. female here accompanied by mother for Vomiting and Fever (Since last night)    HPI: 1-year-old female presents for evaluation of acute onset of vomiting since yesterday, so far 6 episodes, including this morning.  Has not been able to tolerate food or liquids.  Associated symptoms are low-grade fever, stomach pain.and headache.  Mom noticed some nasal congestion this morning.  No sore throat.  No diarrhea.  No constipation.  Abdominal pain is located to the area around the umbilicus.  No dysuria or urinary frequency. No decreased urine. No ill contacts at home.  Goes to school.    [place] allergies, medications, history, and problem list were updated as appropriate.    Review of Systems   Constitutional:  Positive for appetite change and fever. Negative for activity change.   HENT:  Positive for congestion. Negative for rhinorrhea and sore throat.    Eyes:  Negative for discharge and redness.   Respiratory:  Negative for cough.    Gastrointestinal:  Positive for abdominal pain and vomiting. Negative for abdominal distention, blood in stool, constipation, diarrhea and nausea.   Genitourinary:  Negative for decreased urine volume and dysuria.   Skin:  Negative for rash.   Neurological:  Positive for headaches.        OBJECTIVE:  Vital signs  Vitals:    10/26/22 1103   BP: (!) 90/62   BP Location: Right arm   Patient Position: Sitting   BP Method: Pediatric (Manual)   Pulse: (!) 138   Resp: 24   Temp: 97.3 °F (36.3 °C)   TempSrc: Temporal   SpO2: 98%   Weight: 14.1 kg (31 lb 1.4 oz)   Height: 3' 0.42" (0.925 m)        Physical Exam  Constitutional:       General: She is awake and active. She is not in acute distress.  HENT:      Head: Normocephalic and atraumatic.      Right Ear: Tympanic membrane normal.      Left Ear: Tympanic membrane normal.      Nose: Congestion present.      Mouth/Throat:      Lips: Pink.      Mouth: Mucous membranes are moist. No oral lesions. "      Pharynx: Oropharynx is clear. No pharyngeal vesicles or posterior oropharyngeal erythema.      Tonsils: No tonsillar exudate. 1+ on the right. 1+ on the left.   Eyes:      General: Lids are normal.      Conjunctiva/sclera: Conjunctivae normal.      Pupils: Pupils are equal, round, and reactive to light.   Cardiovascular:      Rate and Rhythm: Normal rate and regular rhythm.      Heart sounds: S1 normal and S2 normal. No murmur heard.  Pulmonary:      Effort: Pulmonary effort is normal. No retractions.      Breath sounds: Normal breath sounds. No decreased breath sounds, wheezing or rales.   Abdominal:      General: Bowel sounds are increased. There is no distension.      Palpations: Abdomen is soft. There is no hepatomegaly, splenomegaly or mass.      Tenderness: There is no abdominal tenderness. There is no guarding or rebound.   Musculoskeletal:         General: Normal range of motion.      Cervical back: Neck supple.   Skin:     General: Skin is warm.      Findings: No rash.   Neurological:      General: No focal deficit present.      Mental Status: She is alert.      Motor: No abnormal muscle tone.        ASSESSMENT/PLAN:  Amparo was seen today for vomiting and fever.    Diagnoses and all orders for this visit:    Acute vomiting  Comments:  Rule out viral process, early enteritis.  Influenza test negative.  Orders:  -     Influenza A & B by Molecular  -     POCT COVID-19 Rapid Screening    Other orders  -     ondansetron (ZOFRAN-ODT) 4 MG TbDL; Take 0.5 tablets (2 mg total) by mouth every 12 (twelve) hours as needed (vomiting).  -     ondansetron disintegrating tablet 4 mg       Recent Results (from the past 24 hour(s))   Influenza A & B by Molecular    Collection Time: 10/26/22 11:47 AM    Specimen: Nasopharyngeal Swab   Result Value Ref Range    Influenza A, Molecular Negative Negative    Influenza B, Molecular Negative Negative    Flu A & B Source NP    POCT COVID-19 Rapid Screening    Collection Time:  Upper Extremity: WNL    Upper Extremity Strength:  Right Upper Extremity: WNL  Left Upper Extremity: WNL    Fine motor coordination:   Intact    Gross motor coordination: WFL    Functional Mobility:  Bed Mobility:  Rolling/Turning to Left: Independent  Rolling/Turning Right: Independent  Scooting/Bridging: Independent  Supine to Sit: Independent  Sit to Supine: Independent    Transfers:  Sit <> Stand Assistance: Independent  Sit <> Stand Assistive Device: No Assistive Device  Bed <> Chair Technique: Stand Pivot  Bed <> Chair Transfer Assistance: Independent  Toilet Transfer Technique: Stand Pivot  Toilet Transfer Assistance: Independent    Functional Ambulation: Independent    Activities of Daily Living:  UE Dressing Level of Assistance: Independent  LE Dressing Level of Assistance: Independent  Grooming Position: Standing  Grooming Level of Assistance: Independent  Toileting Where Assessed: Toilet  Toileting Level of Assistance: Independent       Additional Treatment:   Patient education provided for stroke warning signs, prevention guidelines and personal risk factors (HTN).  Patient verbalizing understanding via teach back method.   Patient education provided on role of OT and need for no further OT upon discharge.   Patient verbalizing understanding via teach back method.   Patient alert and oriented x 3; able to follow 4/4 one step commands.  Patient attentive and interactive throughout the session.  Patient able to identify 5/5 body parts.  Able to name 5/5 objects.  Able to sequence 7/7 days of the week and 12/12 months of the year.  Patient's functional status and disposition recommendation discussed with stroke team in daily rounds.  White board updated in patient's room.  OT asked if there were any other questions; patient/ family had no further questions.    Modified Meeker Scale:  0/6.  0- No symptoms.    The Barthel Index of Activities of Daily Living Score:  Bowels:  2 = continent  Bladder:  2 =  "continent (for over 7 days).  Groomin = independent face/hair/teeth/shaving (implements provided)  Toilet Use:  2 = independent (on and off, dressing, wiping)  Feeding : 2 = independent (food provided within reach).  Transfer:3 = independent   Mobility: 3 = independent  Dressin = independent (including buttons, zips, laces, etc.)  Stairs: 2 = independent up and down  Bathin = independent (or in shower)  Score: 20/20 (modified scoring for Barthel index)   lower scores indicating increased disability    Postural Assessment Scale for Stroke: 36/36  1.  Sitting without support--3  2.  Standing with support--3  3.  Standing without support--3  4 and 5: Standing on nonparetic /paretic leg--6  6.  Supine to affected side lateral--3  7.  Supine to nonaffected side lateral--3  8.  Supine to sitting up on the edge of the table--3  9.  Sitting on the edge of the table to supine--3  10.  Sitting to standing up--3  11.  Standing up to sitting down--3  12.  Standing, picking up a pencil from the floor--3    AM-PAC 6 CLICK ADL  How much help from another person does this patient currently need?  1 = Unable, Total/Dependent Assistance  2 = A lot, Maximum/Moderate Assistance  3 = A little, Minimum/Contact Guard/Supervision  4 = None, Modified [place]/Independent    Putting on and taking off regular lower body clothing? : 4  Bathing (including washing, rinsing, drying)?: 4  Toileting, which includes using toilet, bedpan, or urinal? : 4  Putting on and taking off regular upper body clothing?: 4  Taking care of personal grooming such as brushing teeth?: 4  Eating meals?: 4  Total Score: 24    AM-PAC Raw Score CMS "G-Code Modifier Level of Impairment Assistance   6 % Total / Unable   7 - 9 CM 80 - 100% Maximal Assist   10-14 CL 60 - 80% Moderate Assist   15 - 19 CK 40 - 60% Moderate Assist   20 - 22 CJ 20 - 40% Minimal Assist   23 CI 1-20% SBA / CGA   24 CH 0% Independent/ Mod I       Patient left supine with " 10/26/22 12:12 PM   Result Value Ref Range    POC Rapid COVID Negative Negative     Acceptable Yes      Patient was given a dose of Zofran here in clinic.  Mother advised to hydrate using Pedialyte, Gatorade Powerade.  Give a small frequent amounts.  Liquid diet for the next 6 hours, then advanced as tolerated.  Discussed possibility of diarrhea.  Discussed bland diet.  Notify if no improvement of symptoms within the next 48 hours.  Follow Up:  Follow up if symptoms worsen or fail to improve.         all lines intact and call button in reach    Assessment:  Reji Aleman is a 62 y.o. male with a medical diagnosis of TIA and presents without performance deficits of physical skills; cognitive skills or psychosocial skills.  Able to organize occupational performance in a timely and safe manner; demonstrating skills necessary to successfully and appropriately participate in everyday tasks and social situations.  No activity limitations noted. No further OT needed.     Pt evaluation falls under low complexity for evaluation coding due to performance deficits noted in 1-3 areas as stated above and 0 co-morbities affecting current functional status. Data obtained from problem focused assessments. No modifications or assistance was required for completion of evaluation. Only brief occupational profile and history review completed.      Rehab identified problem list/impairments: Rehab identified problem list/impairments:  (none)    Rehab potential is good.    Activity tolerance: Good    Discharge recommendations: Discharge Facility/Level Of Care Needs: home     Barriers to discharge: Barriers to Discharge: None    Equipment recommendations: none     GOALS:    Occupational Therapy Goals     Not on file          Multidisciplinary Problems (Resolved)        Problem: Occupational Therapy Goal    Goal Priority Disciplines Outcome Interventions   Occupational Therapy Goal   (Resolved)     OT, PT/OT Outcome(s) achieved    Description:  Goals not set 2* no further OT needed.                    PLAN:  Discharge from OT services on acute.   Plan of Care reviewed with: patient    OT G-codes  Functional Assessment Tool Used: FIM  Score: 7  Functional Limitation: Self care  Self Care Current Status ():   Self Care Goal Status ():   Self Care Discharge Status (): ANALISA [place]  08/05/2017

## 2022-11-18 DIAGNOSIS — L29.9 PRURITUS: ICD-10-CM

## 2022-11-18 RX ORDER — HYDROXYZINE HYDROCHLORIDE 25 MG/1
TABLET, FILM COATED ORAL
Qty: 30 TABLET | Refills: 5 | Status: SHIPPED | OUTPATIENT
Start: 2022-11-18 | End: 2023-05-19

## 2022-11-28 ENCOUNTER — PATIENT MESSAGE (OUTPATIENT)
Dept: CARDIOLOGY | Facility: CLINIC | Age: 67
End: 2022-11-28
Payer: COMMERCIAL

## 2022-11-28 DIAGNOSIS — I10 ESSENTIAL HYPERTENSION: ICD-10-CM

## 2022-11-28 DIAGNOSIS — I48.91 ATRIAL FIBRILLATION, UNSPECIFIED TYPE: Primary | ICD-10-CM

## 2022-11-28 DIAGNOSIS — I63.411 EMBOLIC STROKE INVOLVING RIGHT MIDDLE CEREBRAL ARTERY: ICD-10-CM

## 2022-11-28 RX ORDER — ATENOLOL 50 MG/1
50 TABLET ORAL DAILY
Qty: 90 TABLET | Refills: 1 | Status: SHIPPED | OUTPATIENT
Start: 2022-11-28 | End: 2023-07-31 | Stop reason: SDUPTHER

## 2023-03-10 ENCOUNTER — OFFICE VISIT (OUTPATIENT)
Dept: CARDIOLOGY | Facility: CLINIC | Age: 68
End: 2023-03-10
Payer: COMMERCIAL

## 2023-03-10 ENCOUNTER — LAB VISIT (OUTPATIENT)
Dept: LAB | Facility: HOSPITAL | Age: 68
End: 2023-03-10
Attending: INTERNAL MEDICINE
Payer: COMMERCIAL

## 2023-03-10 ENCOUNTER — HOSPITAL ENCOUNTER (OUTPATIENT)
Dept: CARDIOLOGY | Facility: CLINIC | Age: 68
Discharge: HOME | End: 2023-03-10
Payer: COMMERCIAL

## 2023-03-10 VITALS
HEIGHT: 75 IN | WEIGHT: 218.5 LBS | BODY MASS INDEX: 27.17 KG/M2 | OXYGEN SATURATION: 97 % | HEART RATE: 73 BPM | DIASTOLIC BLOOD PRESSURE: 68 MMHG | SYSTOLIC BLOOD PRESSURE: 141 MMHG

## 2023-03-10 DIAGNOSIS — I63.411 EMBOLIC STROKE INVOLVING RIGHT MIDDLE CEREBRAL ARTERY: ICD-10-CM

## 2023-03-10 DIAGNOSIS — I48.91 ATRIAL FIBRILLATION, UNSPECIFIED TYPE: ICD-10-CM

## 2023-03-10 DIAGNOSIS — R47.01 EXPRESSIVE APHASIA: ICD-10-CM

## 2023-03-10 DIAGNOSIS — I10 ESSENTIAL HYPERTENSION: ICD-10-CM

## 2023-03-10 DIAGNOSIS — I10 PRIMARY HYPERTENSION: ICD-10-CM

## 2023-03-10 DIAGNOSIS — R00.0 RAPID HEART BEAT: ICD-10-CM

## 2023-03-10 DIAGNOSIS — I48.0 PAROXYSMAL ATRIAL FIBRILLATION: ICD-10-CM

## 2023-03-10 DIAGNOSIS — Z95.818 STATUS POST PLACEMENT OF IMPLANTABLE LOOP RECORDER: ICD-10-CM

## 2023-03-10 DIAGNOSIS — G93.6 CYTOTOXIC CEREBRAL EDEMA: ICD-10-CM

## 2023-03-10 DIAGNOSIS — I10 PRIMARY HYPERTENSION: Primary | ICD-10-CM

## 2023-03-10 DIAGNOSIS — R47.01 APHASIA: ICD-10-CM

## 2023-03-10 LAB
ALBUMIN SERPL BCP-MCNC: 3.9 G/DL (ref 3.5–5.2)
ALP SERPL-CCNC: 70 U/L (ref 55–135)
ALT SERPL W/O P-5'-P-CCNC: 17 U/L (ref 10–44)
ANION GAP SERPL CALC-SCNC: 6 MMOL/L (ref 8–16)
AST SERPL-CCNC: 20 U/L (ref 10–40)
BASOPHILS # BLD AUTO: 0.04 K/UL (ref 0–0.2)
BASOPHILS NFR BLD: 0.8 % (ref 0–1.9)
BILIRUB SERPL-MCNC: 0.9 MG/DL (ref 0.1–1)
BUN SERPL-MCNC: 15 MG/DL (ref 8–23)
CALCIUM SERPL-MCNC: 9.4 MG/DL (ref 8.7–10.5)
CHLORIDE SERPL-SCNC: 105 MMOL/L (ref 95–110)
CHOLEST SERPL-MCNC: 124 MG/DL (ref 120–199)
CHOLEST/HDLC SERPL: 2.3 {RATIO} (ref 2–5)
CO2 SERPL-SCNC: 27 MMOL/L (ref 23–29)
CREAT SERPL-MCNC: 1.2 MG/DL (ref 0.5–1.4)
DIFFERENTIAL METHOD: ABNORMAL
EOSINOPHIL # BLD AUTO: 0.4 K/UL (ref 0–0.5)
EOSINOPHIL NFR BLD: 6.8 % (ref 0–8)
ERYTHROCYTE [DISTWIDTH] IN BLOOD BY AUTOMATED COUNT: 14.6 % (ref 11.5–14.5)
EST. GFR  (NO RACE VARIABLE): >60 ML/MIN/1.73 M^2
ESTIMATED AVG GLUCOSE: 126 MG/DL (ref 68–131)
GLUCOSE SERPL-MCNC: 94 MG/DL (ref 70–110)
HBA1C MFR BLD: 6 % (ref 4–5.6)
HCT VFR BLD AUTO: 43.5 % (ref 40–54)
HDLC SERPL-MCNC: 53 MG/DL (ref 40–75)
HDLC SERPL: 42.7 % (ref 20–50)
HGB BLD-MCNC: 14.3 G/DL (ref 14–18)
IMM GRANULOCYTES # BLD AUTO: 0.01 K/UL (ref 0–0.04)
IMM GRANULOCYTES NFR BLD AUTO: 0.2 % (ref 0–0.5)
LDLC SERPL CALC-MCNC: 60.2 MG/DL (ref 63–159)
LYMPHOCYTES # BLD AUTO: 1.4 K/UL (ref 1–4.8)
LYMPHOCYTES NFR BLD: 26.8 % (ref 18–48)
MCH RBC QN AUTO: 27 PG (ref 27–31)
MCHC RBC AUTO-ENTMCNC: 32.9 G/DL (ref 32–36)
MCV RBC AUTO: 82 FL (ref 82–98)
MONOCYTES # BLD AUTO: 0.5 K/UL (ref 0.3–1)
MONOCYTES NFR BLD: 9.2 % (ref 4–15)
NEUTROPHILS # BLD AUTO: 2.9 K/UL (ref 1.8–7.7)
NEUTROPHILS NFR BLD: 56.2 % (ref 38–73)
NONHDLC SERPL-MCNC: 71 MG/DL
NRBC BLD-RTO: 0 /100 WBC
PLATELET # BLD AUTO: 200 K/UL (ref 150–450)
PMV BLD AUTO: 10.7 FL (ref 9.2–12.9)
POTASSIUM SERPL-SCNC: 4.2 MMOL/L (ref 3.5–5.1)
PROT SERPL-MCNC: 6.9 G/DL (ref 6–8.4)
RBC # BLD AUTO: 5.3 M/UL (ref 4.6–6.2)
SODIUM SERPL-SCNC: 138 MMOL/L (ref 136–145)
TRIGL SERPL-MCNC: 54 MG/DL (ref 30–150)
WBC # BLD AUTO: 5.12 K/UL (ref 3.9–12.7)

## 2023-03-10 PROCEDURE — 93010 EKG 12-LEAD: ICD-10-PCS | Mod: S$GLB,,, | Performed by: INTERNAL MEDICINE

## 2023-03-10 PROCEDURE — 93005 EKG 12-LEAD: ICD-10-PCS | Mod: S$GLB,,, | Performed by: INTERNAL MEDICINE

## 2023-03-10 PROCEDURE — 80053 COMPREHEN METABOLIC PANEL: CPT | Performed by: INTERNAL MEDICINE

## 2023-03-10 PROCEDURE — 80061 LIPID PANEL: CPT | Performed by: INTERNAL MEDICINE

## 2023-03-10 PROCEDURE — 99214 PR OFFICE/OUTPT VISIT, EST, LEVL IV, 30-39 MIN: ICD-10-PCS | Mod: S$PBB,,, | Performed by: INTERNAL MEDICINE

## 2023-03-10 PROCEDURE — 99999 PR PBB SHADOW E&M-EST. PATIENT-LVL V: CPT | Mod: PBBFAC,,, | Performed by: INTERNAL MEDICINE

## 2023-03-10 PROCEDURE — 36415 COLL VENOUS BLD VENIPUNCTURE: CPT | Performed by: INTERNAL MEDICINE

## 2023-03-10 PROCEDURE — 83036 HEMOGLOBIN GLYCOSYLATED A1C: CPT | Performed by: INTERNAL MEDICINE

## 2023-03-10 PROCEDURE — 93005 ELECTROCARDIOGRAM TRACING: CPT | Mod: S$GLB,,, | Performed by: INTERNAL MEDICINE

## 2023-03-10 PROCEDURE — 85025 COMPLETE CBC W/AUTO DIFF WBC: CPT | Performed by: INTERNAL MEDICINE

## 2023-03-10 PROCEDURE — 99214 OFFICE O/P EST MOD 30 MIN: CPT | Mod: S$PBB,,, | Performed by: INTERNAL MEDICINE

## 2023-03-10 PROCEDURE — 99999 PR PBB SHADOW E&M-EST. PATIENT-LVL V: ICD-10-PCS | Mod: PBBFAC,,, | Performed by: INTERNAL MEDICINE

## 2023-03-10 PROCEDURE — 93010 ELECTROCARDIOGRAM REPORT: CPT | Mod: S$GLB,,, | Performed by: INTERNAL MEDICINE

## 2023-03-10 NOTE — PROGRESS NOTES
Subjective:   Patient ID:  Reji Aleman Jr. is a 67 y.o. male who presents for follow-up of Establish Care, Follow-up, and Annual Exam  Reji Aleman Jr. is a 65 y.o. male who presents for follow-up of Hyperlipidemia and Leg Problem (right leg )  Stroke risk factors: HLD, afib,, s/p PVI, h/o embolic CVA  CVA sx resolved.   Asked to be taken off ASA and Plavix and started on Xeralto.       MRI brain:  Small focus of acute cortical infarct in the right frontal lobe with associated microhemorrhage.     HPI:   Non smoker  No family history of heart disease. Sister had Mitral valve disease she had Mitral valve repair.   Does not measure BP at home.   Occasional exercise.   No chest pain, Orthopnea, PND of heart failure symptoms.   Denies palpitations or fluttering in the chest     Echo:    1 - Mild left atrial enlargement.     2 - Normal left ventricular systolic function (EF 60-65%).     3 - Impaired LV relaxation, elevated LAP (grade 2 diastolic dysfunction).     4 - Normal right ventricular systolic function .     5 - Mild mitral regurgitation.     6 - The estimated PA systolic pressure is 36 mmHg.         Event Monitor:  EVENT DETAILS:  The patient's presenting rhythm was sinus rhythm with a rate range of 74-78 beats per minute.  There were three other patient triggered events.  One of them was labeled as routine testing, one was labeled a followup test and one did not   have any symptoms entered.  The initial one without symptoms entered occurred on August 16 at 12:40 a.m. and the activation was consistent with atrial fibrillation with a ventricular rate range of 75-97 beats per minute.  A followup test 17 minutes later   noted continued atrial fibrillation at a ventricular rate range of 67-97 beats per minute.  Another followup test at 09:32 that morning showed sinus rhythm with PACs with a rate range of 72-74 beats per minute.  There were no other triggered events.    SUMMARY:  In summary, this event monitor is  consistent with paroxysmal atrial fibrillation without rapid ventricular conduction.  Clinical correlation is advised.      HPI:   Does not measure BP at home.   Occasional exercise.   No chest pain, Orthopnea, PND of heart failure symptoms.   Denies palpitations or fluttering in the chest     HPI:   Sudden death in sister MV repair long time ago.   No chest pain, Orthopnea, PND of heart failure symptoms.   Denies palpitations or fluttering in the chest  Patient does not exercise.   Patient checks his BP at home and its normal.      The ASCVD Risk score (Campbell BILLIE Jr., et al., 2013) failed to calculate for the following reasons:    The patient has a prior MI or stroke diagnosis     Cardiac CT  The heart is not enlarged and there is no evidence of pericardial effusion. The coronary arteries are free of significant calcific atherosclerosis. 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        Calcium score  Agatston calcium score equals 21, which translates to the 10-25th percentile for coronary calcium load based on age and sex.  Additional findings and recommendations above.     HPI:    Patient has been doing well.  BP good at home.  No chest pain, Orthopnea, PND of heart failure symptoms.   Denies palpitations or fluttering in the chest  Rides bike twice a week and has been pain in the right thigh.       HPI:   Patient has been doing well.  BP good at home.  No chest pain, Orthopnea, PND of heart failure symptoms.   Denies palpitations or fluttering in the chest  Rides bike twice a week and has been pain in the right thigh.  ILR was placed by Dr. Montes and patient would like to remove that      EKG: NSR    Patient Active Problem List   Diagnosis    Embolic stroke involving right middle cerebral artery    Rapid heart beat    Cavernoma    Aphasia    Expressive aphasia    Fibromuscular dysplasia    Cytotoxic cerebral edema     "Paroxysmal atrial fibrillation    Atrial fibrillation     BP (!) 141/68 (BP Location: Left arm, Patient Position: Sitting, BP Method: Medium (Automatic))   Pulse 73   Ht 6' 3" (1.905 m)   Wt 99.1 kg (218 lb 7.6 oz)   SpO2 97%   BMI 27.31 kg/m²   Body mass index is 27.31 kg/m².  CrCl cannot be calculated (Patient's most recent lab result is older than the maximum 7 days allowed.).    Lab Results   Component Value Date     01/06/2021    K 4.5 01/06/2021     01/06/2021    CO2 28 01/06/2021    BUN 14 01/06/2021    CREATININE 1.2 01/06/2021     01/06/2021    MG 1.9 08/05/2017    AST 25 01/06/2021    ALT 20 01/06/2021    ALBUMIN 4.0 01/06/2021    PROT 7.3 01/06/2021    BILITOT 0.6 01/06/2021    WBC 4.14 01/06/2021    HGB 14.9 01/06/2021    HCT 44.6 01/06/2021    MCV 83 01/06/2021     01/06/2021    INR 1.1 04/03/2018    TSH 2.278 08/04/2017    CHOL 129 01/06/2021    HDL 56 01/06/2021    LDLCALC 58.6 (L) 01/06/2021    TRIG 72 01/06/2021       Current Outpatient Medications   Medication Sig    amLODIPine (NORVASC) 10 MG tablet TAKE ONE-HALF (1/2) TABLET DAILY    aspirin 81 MG Chew TAKE 1 TABLET DAILY    atenoloL (TENORMIN) 50 MG tablet Take 1 tablet (50 mg total) by mouth once daily.    fluocinonide 0.05% (LIDEX) 0.05 % cream Apply topically 2 (two) times daily. X 1-2 wks then prn flares only    hydrOXYzine HCL (ATARAX) 25 MG tablet TAKE 1 TABLET NIGHTLY AS NEEDED FOR ITCHING    rosuvastatin (CRESTOR) 40 MG Tab TAKE 1 TABLET EVERY EVENING    triamcinolone acetonide 0.1% (KENALOG) 0.1 % cream AAA BID x 1-2 wks then prn flares only    triamcinolone acetonide 0.1% (KENALOG) 0.1 % ointment APPLY TO AFFECTED AREA TWICE A DAY FOR 1-2 WKS THEN AS NEEDED FLARES ONLY    UNABLE TO FIND Take 1 tablet by mouth once daily. Cramp Defense. OTC    UNABLE TO FIND as needed. Cervae Cream    XARELTO 20 mg Tab TAKE 1 TABLET DAILY WITH DINNER OR EVENING MEAL     Current Facility-Administered Medications "   Medication    betamethasone acetate-betamethasone sodium phosphate injection 6 mg    triamcinolone acetonide injection 40 mg       Review of Systems   Constitutional: Negative for chills, decreased appetite, malaise/fatigue, night sweats, weight gain and weight loss.   Eyes:  Negative for blurred vision, double vision, visual disturbance and visual halos.   Cardiovascular:  Negative for chest pain, claudication, cyanosis, dyspnea on exertion, irregular heartbeat, leg swelling, near-syncope, orthopnea, palpitations, paroxysmal nocturnal dyspnea and syncope.   Respiratory:  Negative for cough, hemoptysis, snoring, sputum production and wheezing.    Endocrine: Negative for cold intolerance, heat intolerance, polydipsia and polyphagia.   Hematologic/Lymphatic: Negative for adenopathy and bleeding problem. Does not bruise/bleed easily.   Skin:  Negative for flushing, itching, poor wound healing and rash.   Musculoskeletal:  Negative for arthritis, back pain, falls, gout, joint pain, joint swelling, muscle cramps, muscle weakness, myalgias, neck pain and stiffness.   Gastrointestinal:  Negative for bloating, abdominal pain, anorexia, diarrhea, dysphagia, excessive appetite, flatus, hematemesis, jaundice, melena and nausea.   Genitourinary:  Negative for hesitancy and incomplete emptying.   Neurological:  Negative for aphonia, brief paralysis, difficulty with concentration, disturbances in coordination, excessive daytime sleepiness, dizziness, focal weakness, light-headedness, loss of balance and weakness.   Psychiatric/Behavioral:  Negative for altered mental status, depression, hallucinations, hypervigilance, memory loss, substance abuse and suicidal ideas. The patient does not have insomnia and is not nervous/anxious.      Objective:   Physical Exam  Vitals reviewed: scar on the chest at the site of ILR insertion.   Constitutional:       General: He is not in acute distress.     Appearance: He is well-developed. He is  not diaphoretic.   HENT:      Head: Normocephalic and atraumatic.      Nose: Nose normal.      Mouth/Throat:      Pharynx: No oropharyngeal exudate.   Eyes:      General: No scleral icterus.        Right eye: No discharge.         Left eye: No discharge.      Conjunctiva/sclera: Conjunctivae normal.      Pupils: Pupils are equal, round, and reactive to light.   Neck:      Thyroid: No thyromegaly.      Vascular: No JVD.      Trachea: No tracheal deviation.   Cardiovascular:      Rate and Rhythm: Normal rate and regular rhythm.      Pulses: Intact distal pulses.      Heart sounds: Normal heart sounds. No murmur heard.    No friction rub. No gallop.   Pulmonary:      Effort: Pulmonary effort is normal. No respiratory distress.      Breath sounds: Normal breath sounds. No stridor. No wheezing or rales.   Chest:      Chest wall: No tenderness.   Abdominal:      General: Bowel sounds are normal. There is no distension.      Palpations: Abdomen is soft. There is no mass.      Tenderness: There is no abdominal tenderness.   Musculoskeletal:         General: No tenderness.      Cervical back: Normal range of motion and neck supple.   Lymphadenopathy:      Cervical: No cervical adenopathy.   Skin:     General: Skin is warm.      Coloration: Skin is not pale.      Findings: No erythema or rash.   Neurological:      Mental Status: He is alert and oriented to person, place, and time.      Cranial Nerves: No cranial nerve deficit.      Motor: No abnormal muscle tone.      Coordination: Coordination normal.      Deep Tendon Reflexes: Reflexes are normal and symmetric. Reflexes normal.   Psychiatric:         Behavior: Behavior normal.         Thought Content: Thought content normal.         Judgment: Judgment normal.       Assessment:     1. Primary hypertension    2. Paroxysmal atrial fibrillation    3. Rapid heart beat    4. Embolic stroke involving right middle cerebral artery    5. Aphasia    6. Expressive aphasia    7.  Cytotoxic cerebral edema    8. Status post placement of implantable loop recorder      Plan:   Reji was seen today for establish care, follow-up and annual exam.    Diagnoses and all orders for this visit:    Primary hypertension  -     Lipid Panel; Future  -     CBC Auto Differential; Future  -     Comprehensive Metabolic Panel; Future  -     HEMOGLOBIN A1C; Future    Paroxysmal atrial fibrillation  -     Ambulatory referral/consult to Electrophysiology; Future    Rapid heart beat    Embolic stroke involving right middle cerebral artery    Aphasia    Expressive aphasia    Cytotoxic cerebral edema    Status post placement of implantable loop recorder

## 2023-03-13 NOTE — PROGRESS NOTES
As Dr. Gardner requested me to do is to inform Mr. Aleman about his test results. I called and informed Mr. Aleman that his EKG shows Atrial fibrillation at  a regular rate. There is nothing to worry about.  He verbalized and understood.

## 2023-03-16 NOTE — PROGRESS NOTES
As Dr. Gardner requested me to do is to inform Mr. Aleman about his test results. I called and informed mr. Aleman that his  labs are normal except borderline diabetes. He verbalized and understood.

## 2023-05-11 DIAGNOSIS — I48.91 ATRIAL FIBRILLATION, UNSPECIFIED TYPE: Primary | ICD-10-CM

## 2023-05-17 DIAGNOSIS — L29.9 PRURITUS: ICD-10-CM

## 2023-05-19 RX ORDER — HYDROXYZINE HYDROCHLORIDE 25 MG/1
TABLET, FILM COATED ORAL
Qty: 90 TABLET | Refills: 1 | Status: SHIPPED | OUTPATIENT
Start: 2023-05-19

## 2023-05-29 RX ORDER — NAPROXEN SODIUM 220 MG/1
TABLET, FILM COATED ORAL
Qty: 90 TABLET | Refills: 3 | Status: ON HOLD | OUTPATIENT
Start: 2023-05-29 | End: 2023-06-20 | Stop reason: HOSPADM

## 2023-05-29 NOTE — H&P (VIEW-ONLY)
Subjective:   Patient ID:  Reji Aleman Jr. is a 68 y.o. male who presents for follow-up of Atrial Fibrillation      68 yoM HTN, pAF, CVA here for AF management. He suffered a frontal lobe CVA 8/4/17 with newly diagnosed atrial fibrillation. He presented with aphasia and had resolution of symptoms without invasive therapy. He had normal LV function. He was started on rivaroxaban for CVA prophylaxis. He was started on atenolol for rate control. In retrospect he denies any preceding symptoms. He has had subsequent AF episodes on event monitor that have been mostly asymptomatic. He was in AF 10/9/17 during his echo without symptoms. He noticed no change in his overall state of health over the past few weeks to months. He has had symptomatic palpitations in the past but these have never been linked to AF. He has recovered from his CVA without residual deficit.     6/18: Cryo PVI 4/6/18 with ILR implant. Symptoms mostly unchanged. AF burden 13% since PVI. No complications.     Interval history: RAGINI on ILR. He asks to have it removed.     MRI brain:  Small focus of acute cortical infarct in the right frontal lobe with associated microhemorrhage.      Echo:    1 - Mild left atrial enlargement.     2 - Normal left ventricular systolic function (EF 60-65%).     3 - Impaired LV relaxation, elevated LAP (grade 2 diastolic dysfunction).     4 - Normal right ventricular systolic function .     5 - Mild mitral regurgitation.     6 - The estimated PA systolic pressure is 36 mmHg.     Past Medical History:  No date: Atrial fibrillation  8/4/2017: Embolic stroke involving right middle cerebral artery  No date: Hypertension    Past Surgical History:  04/06/2018: RADIOFREQUENCY ABLATION      Comment:  Cryo PVI    Social History    Socioeconomic History      Marital status: Single    Tobacco Use      Smoking status: Never      Smokeless tobacco: Never    Substance and Sexual Activity      Alcohol use: No      Drug use: No    Review of  patient's family history indicates:    Current Outpatient Medications:  amLODIPine (NORVASC) 10 MG tablet, TAKE ONE-HALF (1/2) TABLET DAILY, Disp: 45 tablet, Rfl: 3  aspirin 81 MG Chew, TAKE 1 TABLET DAILY, Disp: 90 tablet, Rfl: 3  atenoloL (TENORMIN) 50 MG tablet, Take 1 tablet (50 mg total) by mouth once daily., Disp: 90 tablet, Rfl: 1  fluocinonide 0.05% (LIDEX) 0.05 % cream, Apply topically 2 (two) times daily. X 1-2 wks then prn flares only, Disp: 120 g, Rfl: 3  hydrOXYzine HCL (ATARAX) 25 MG tablet, TAKE 1 TABLET NIGHTLY AS NEEDED FOR ITCHING, Disp: 90 tablet, Rfl: 1  rosuvastatin (CRESTOR) 40 MG Tab, TAKE 1 TABLET EVERY EVENING, Disp: 90 tablet, Rfl: 3  triamcinolone acetonide 0.1% (KENALOG) 0.1 % cream, AAA BID x 1-2 wks then prn flares only, Disp: 454 g, Rfl: 0  triamcinolone acetonide 0.1% (KENALOG) 0.1 % ointment, APPLY TO AFFECTED AREA TWICE A DAY FOR 1-2 WKS THEN AS NEEDED FLARES ONLY, Disp: , Rfl: 0  UNABLE TO FIND, Take 1 tablet by mouth once daily. Cramp Defense. OTC, Disp: , Rfl:   UNABLE TO FIND, as needed. Cervae Cream, Disp: , Rfl:   XARELTO 20 mg Tab, TAKE 1 TABLET DAILY WITH DINNER OR EVENING MEAL, Disp: 90 tablet, Rfl: 3    Current Facility-Administered Medications:  betamethasone acetate-betamethasone sodium phosphate injection 6 mg, 6 mg, Intramuscular, 1 time in Clinic/HOD, Florence Guzman MD  triamcinolone acetonide injection 40 mg, 40 mg, Intramuscular, 1 time in Clinic/HOD, Florence Guzman MD      Review of Systems   Constitutional: Negative for malaise/fatigue.   Cardiovascular:  Negative for chest pain, dyspnea on exertion, irregular heartbeat, leg swelling and palpitations.   Respiratory:  Negative for shortness of breath.    Hematologic/Lymphatic: Negative for bleeding problem.   Skin:  Negative for rash.   Musculoskeletal:  Negative for myalgias.   Gastrointestinal:  Negative for hematemesis, hematochezia and nausea.   Genitourinary:  Negative for hematuria.   Neurological:   Negative for light-headedness.   Psychiatric/Behavioral:  Negative for altered mental status.    Allergic/Immunologic: Negative for persistent infections.     Objective:   Physical Exam  Vitals reviewed: scar on the chest at the site of ILR insertion.   Constitutional:       General: He is not in acute distress.     Appearance: He is well-developed. He is not diaphoretic.   HENT:      Head: Normocephalic and atraumatic.      Nose: Nose normal.      Mouth/Throat:      Pharynx: No oropharyngeal exudate.   Eyes:      General: No scleral icterus.        Right eye: No discharge.         Left eye: No discharge.      Conjunctiva/sclera: Conjunctivae normal.      Pupils: Pupils are equal, round, and reactive to light.   Neck:      Thyroid: No thyromegaly.      Vascular: No JVD.      Trachea: No tracheal deviation.   Cardiovascular:      Rate and Rhythm: Normal rate and regular rhythm.      Pulses: Intact distal pulses.      Heart sounds: Normal heart sounds. No murmur heard.    No friction rub. No gallop.   Pulmonary:      Effort: Pulmonary effort is normal. No respiratory distress.      Breath sounds: Normal breath sounds. No stridor. No wheezing or rales.   Chest:      Chest wall: No tenderness.   Abdominal:      General: Bowel sounds are normal. There is no distension.      Palpations: Abdomen is soft. There is no mass.      Tenderness: There is no abdominal tenderness.   Musculoskeletal:         General: No tenderness.      Cervical back: Normal range of motion and neck supple.   Lymphadenopathy:      Cervical: No cervical adenopathy.   Skin:     General: Skin is warm.      Coloration: Skin is not pale.      Findings: No erythema or rash.   Neurological:      Mental Status: He is alert and oriented to person, place, and time.      Cranial Nerves: No cranial nerve deficit.      Motor: No abnormal muscle tone.      Coordination: Coordination normal.      Deep Tendon Reflexes: Reflexes are normal and symmetric. Reflexes  normal.   Psychiatric:         Behavior: Behavior normal.         Thought Content: Thought content normal.         Judgment: Judgment normal.       Assessment:      1. Persistent atrial fibrillation    2. Paroxysmal atrial fibrillation        Plan:   68 yoM here for ILR management. He has reached RAGINI and asks for removal. Extensive discussion regarding risks, benefits, and alternative approaches to the procedure was had with the patient.  The patient voices understanding and all questions have been answered.  The patient would like to proceed as planned.

## 2023-05-30 ENCOUNTER — TELEPHONE (OUTPATIENT)
Dept: CARDIOLOGY | Facility: HOSPITAL | Age: 68
End: 2023-05-30

## 2023-05-30 ENCOUNTER — PATIENT MESSAGE (OUTPATIENT)
Dept: ELECTROPHYSIOLOGY | Facility: CLINIC | Age: 68
End: 2023-05-30

## 2023-05-30 ENCOUNTER — OFFICE VISIT (OUTPATIENT)
Dept: ELECTROPHYSIOLOGY | Facility: CLINIC | Age: 68
End: 2023-05-30
Payer: COMMERCIAL

## 2023-05-30 VITALS
SYSTOLIC BLOOD PRESSURE: 110 MMHG | WEIGHT: 222 LBS | BODY MASS INDEX: 27.6 KG/M2 | HEIGHT: 75 IN | DIASTOLIC BLOOD PRESSURE: 76 MMHG

## 2023-05-30 DIAGNOSIS — Z45.09 ENCOUNTER FOR LOOP RECORDER AT END OF BATTERY LIFE: Primary | ICD-10-CM

## 2023-05-30 DIAGNOSIS — I48.19 PERSISTENT ATRIAL FIBRILLATION: Primary | ICD-10-CM

## 2023-05-30 DIAGNOSIS — Z98.890 HISTORY OF LOOP RECORDER: Primary | ICD-10-CM

## 2023-05-30 DIAGNOSIS — I48.0 PAROXYSMAL ATRIAL FIBRILLATION: ICD-10-CM

## 2023-05-30 PROCEDURE — 99999 PR PBB SHADOW E&M-EST. PATIENT-LVL IV: CPT | Mod: PBBFAC,,, | Performed by: INTERNAL MEDICINE

## 2023-05-30 PROCEDURE — 99214 OFFICE O/P EST MOD 30 MIN: CPT | Mod: S$PBB,,, | Performed by: INTERNAL MEDICINE

## 2023-05-30 PROCEDURE — 99214 PR OFFICE/OUTPT VISIT, EST, LEVL IV, 30-39 MIN: ICD-10-PCS | Mod: S$PBB,,, | Performed by: INTERNAL MEDICINE

## 2023-05-30 PROCEDURE — 99999 PR PBB SHADOW E&M-EST. PATIENT-LVL IV: ICD-10-PCS | Mod: PBBFAC,,, | Performed by: INTERNAL MEDICINE

## 2023-06-09 ENCOUNTER — TELEPHONE (OUTPATIENT)
Dept: ELECTROPHYSIOLOGY | Facility: CLINIC | Age: 68
End: 2023-06-09
Payer: MEDICARE

## 2023-06-09 NOTE — TELEPHONE ENCOUNTER
Spoke to Mr. Aleman    CONFIRMED procedure arrival time of 1:30PM    Reiterated instructions including:  -Directions to check in desk  -High importance of HOLDING Xarelto after 6/9  -Confirmed no fever, cough, or shortness of breath  -Bathe night prior and morning prior to procedure with Hibiclens solution or an antibacterial soap    Patient verbalized understanding of above and appreciated the call.

## 2023-06-12 ENCOUNTER — HOSPITAL ENCOUNTER (OUTPATIENT)
Facility: HOSPITAL | Age: 68
Discharge: HOME | End: 2023-06-12
Attending: INTERNAL MEDICINE | Admitting: INTERNAL MEDICINE
Payer: MEDICARE

## 2023-06-12 VITALS
HEART RATE: 70 BPM | BODY MASS INDEX: 27.1 KG/M2 | SYSTOLIC BLOOD PRESSURE: 139 MMHG | DIASTOLIC BLOOD PRESSURE: 79 MMHG | WEIGHT: 218 LBS | OXYGEN SATURATION: 97 % | HEIGHT: 75 IN | TEMPERATURE: 97 F | RESPIRATION RATE: 18 BRPM

## 2023-06-12 DIAGNOSIS — Z95.9 CARDIAC DEVICE IN SITU: ICD-10-CM

## 2023-06-12 DIAGNOSIS — Z45.09 ENCOUNTER FOR LOOP RECORDER AT END OF BATTERY LIFE: Primary | ICD-10-CM

## 2023-06-12 PROCEDURE — 93005 ELECTROCARDIOGRAM TRACING: CPT

## 2023-06-12 PROCEDURE — 93010 EKG 12-LEAD: ICD-10-PCS | Mod: ,,, | Performed by: INTERNAL MEDICINE

## 2023-06-12 PROCEDURE — 33286 RMVL SUBQ CAR RHYTHM MNTR: CPT | Performed by: INTERNAL MEDICINE

## 2023-06-12 PROCEDURE — 63600175 PHARM REV CODE 636 W HCPCS: Performed by: INTERNAL MEDICINE

## 2023-06-12 PROCEDURE — 33286 RMVL SUBQ CAR RHYTHM MNTR: CPT | Mod: ,,, | Performed by: INTERNAL MEDICINE

## 2023-06-12 PROCEDURE — 33286 PR REMOVAL, SUBQ CARDIAC RHYTHM MONITOR: ICD-10-PCS | Mod: ,,, | Performed by: INTERNAL MEDICINE

## 2023-06-12 PROCEDURE — 25000003 PHARM REV CODE 250: Performed by: INTERNAL MEDICINE

## 2023-06-12 PROCEDURE — 93010 ELECTROCARDIOGRAM REPORT: CPT | Mod: ,,, | Performed by: INTERNAL MEDICINE

## 2023-06-12 RX ORDER — VANCOMYCIN HYDROCHLORIDE 1 G/20ML
INJECTION, POWDER, LYOPHILIZED, FOR SOLUTION INTRAVENOUS
Status: DISCONTINUED | OUTPATIENT
Start: 2023-06-12 | End: 2023-06-12 | Stop reason: HOSPADM

## 2023-06-12 RX ORDER — CEFAZOLIN SODIUM 1 G/3ML
INJECTION, POWDER, FOR SOLUTION INTRAMUSCULAR; INTRAVENOUS
Status: DISCONTINUED | OUTPATIENT
Start: 2023-06-12 | End: 2023-06-12 | Stop reason: HOSPADM

## 2023-06-12 RX ORDER — LIDOCAINE HYDROCHLORIDE AND EPINEPHRINE 20; 10 MG/ML; UG/ML
INJECTION, SOLUTION INFILTRATION; PERINEURAL
Status: DISCONTINUED | OUTPATIENT
Start: 2023-06-12 | End: 2023-06-12 | Stop reason: HOSPADM

## 2023-06-12 RX ORDER — SODIUM CHLORIDE 0.9 G/100ML
IRRIGANT IRRIGATION
Status: DISCONTINUED | OUTPATIENT
Start: 2023-06-12 | End: 2023-06-12 | Stop reason: HOSPADM

## 2023-06-12 NOTE — PLAN OF CARE
Received report from Jitendra. Patient s/p loop recorder removal, AAOx3. VSS, no c/o pain or discomfort at this time, resp even and unlabored. Mepilex dressing to L chest is CDI. No active bleeding. No hematoma noted. Post procedure protocol reviewed with patient. Understanding verbalized. Nurse call bell within reach. Will continue to monitor per post procedure protocol.

## 2023-06-12 NOTE — INTERVAL H&P NOTE
Mr. Aleman is a 68 year old male who presents today to [facility] for scheduled ILR removal with Dr. Montes. He denies any chest pain, palpitations, SOB, NAILS, dizziness, light headedness, weakness, LE swelling, syncope, or near syncopal episodes. He denies any bleeding, infections, fevers, rashes, or surgeries in the past 30 days. VSS. He is currently taking Xarelto 20 mg daily (last dose taken 6/8/23), atenolol 50 mg daily, and amlodipine 5 mg daily.    The patient has been examined and the H&P has been reviewed:     I concur with the findings and no changes have occurred since H&P was written.      Review of Systems   Constitution: Negative. Negative for fevers/chills, weakness and malaise/fatigue.   HENT: Negative.  Negative for ear pain and tinnitus.    Eyes: Negative for blurred vision.   Cardiovascular: Negative. Negative for chest pain, dyspnea on exertion, leg swelling, near-syncope, palpitations and syncope.   Respiratory: Negative. Negative for shortness of breath.    Endocrine: Negative.  Negative for polyuria.   Hematologic/Lymphatic: Positive for bruise/bleed easily. Negative for significant bleeding.  Skin: Negative.  Negative for rash.   Musculoskeletal: Negative.  Negative for joint pain and muscle weakness.   Gastrointestinal: Negative.  Negative for abdominal pain, hematemesis, and change in bowel habit.   Genitourinary: Negative for frequency or hematuria.   Neurological: Negative. Negative for dizziness.   Psychiatric/Behavioral: Negative. Negative for depression. The patient is not nervous/anxious.       Physical Exam   Constitutional: Oriented to person, place, and time. Appears well-developed and well-nourished.   HENT:   Head: Normocephalic and atraumatic.   Eyes: Conjunctivae, EOM and lids are normal. No scleral icterus.   Neck: Normal range of motion.   Cardiovascular: Normal rate and intact distal pulses. Irregular rhythm present.   Pulses:       Radial pulses are 2+ on the right side, and 2+  on the left side.   Pulmonary/Chest: Effort normal and breath sounds normal. No accessory muscle usage. No tachypnea. No respiratory distress. No wheezes. Left chest wall device site in good condition.   Abdominal: Soft. Bowel sounds are normal. No distension. There is no tenderness.   Musculoskeletal: Normal range of motion. No edema. No focal numbness or weakness.  Neurological: Alert and oriented to person, place, and time. Normal muscle tone.   Skin: Skin is warm and dry. No rash noted.   Psychiatric: Normal mood and affect. Behavior is normal.   Nursing note and vitals reviewed.     Labs: NA     Significant Studies: ECG today reveals atrial fibrillation at 85 BPM.      Plan:  -ILR removal  -Local anesthetic    Prior to procedure, Dr. Montes had extensive discussion with the patient the risks, benefits, and alternatives of ILR removal. All questions were answered. Patient verbalized understanding and wishes to proceed. Patient would like to proceed with just local anesthetic, and deferred conscious sedation. Consents signed. The patient voiced understanding and all questions have been answered. No further questions/concerns voiced at this time. Consents signed.    LUCINA Neil-C  Cardiology Electrophysiology NP   [facility]    Attending: Jose Angel Montes MD

## 2023-06-12 NOTE — DISCHARGE INSTRUCTIONS
Medications:  -HOLD your Xarelto for 2 more days, then RESUME.  -Continue to take all of your other home medications as listed on your medication list after you are discharged.  -If you have problems or side effects caused by your medications, call your Physician.    New Medications:  None.    Diet  -You may resume oral intake after you are discharged, as long you have no swallowing difficulties.    Go to the Emergency Department if you develop:  -Bleeding.  -Weakness or numbness.  -Visual, gait or speech disturbance.  -New chest pain, palpitations, shortness of breath, rapid heart beat, or fainting.  -Fever.    Monitor your site where your Loop Recorder was removed:  1. You should avoid getting your wound wet for 5 days. You may shower in 48 hours. Do not let beam of shower hit site directly and no scrubbing in area. Do not submerge incision site in water for 2 weeks.    2. You can remove the outside bandage in 48 hours. You should not remove the purple skin glue that covers your wound. This will come off on its own.  3. Every day, take your temperature and check your incision for signs of infection (redness, swelling, drainage, or warmth) for the next 7 days. It is normal to have some pain around the site and some bruising. However constant pain, severe tenderness and pus/drainage coming from your wound is not normal and you should call your physician immediately or seek attention at the ER. Fevers and chills and feeling ill is not normal and you should seek immediate medical attention.    Follow up:  -Wound check in 1 week.  -Dr. Montes in 6 months.

## 2023-06-12 NOTE — PROGRESS NOTES
Pt DC'd per MD order. Discharge instructions given including activity, wound care, S&S of infections,  future appointments, and when to call MD. Medications reviewed including when to take next dose. PIV DC'd, catheter tip intact. Pt declined transport and ambulated off unit.

## 2023-06-13 NOTE — DISCHARGE SUMMARY
[facility] - Short Stay Cardiac Unit  Cardiac Electrophysiology  Discharge Summary      Patient Name: Reji Aleman Jr.  MRN: [id number]  Admission Date: 6/12/2023  Hospital Length of Stay: 0 days  Discharge Date and Time: 6/12/2023  4:42 PM  Attending Physician: Jose Angel Montes MD  Discharging Provider: Lindsey Knowles NP  Primary Care Physician: Andrés Of EveColleen    HPI: Mr. Aleman is a 68 year old male who presents today to [facility] for scheduled ILR removal with Dr. Montes. He denies any chest pain, palpitations, SOB, NAILS, dizziness, light headedness, weakness, LE swelling, syncope, or near syncopal episodes. He denies any bleeding, infections, fevers, rashes, or surgeries in the past 30 days. VSS. He is currently taking Xarelto 20 mg daily (last dose taken 6/8/23), atenolol 50 mg daily, and amlodipine 5 mg daily.     Labs: NA     Significant Studies: ECG today reveals atrial fibrillation at 85 BPM.      Plan:  -ILR removal  -Local anesthetic    Procedure(s) (LRB):  REMOVAL, IMPLANTABLE LOOP RECORDER (N/A)     Indwelling Lines/Drains at time of discharge:  Lines/Drains/Airways     None      Hospital Course: Patient underwent ILR removal. Tolerated procedure well with no acute complications noted. Left chest wall site with C/D/I with no bleeding, drainage, hematoma, or pain to site. Aquacel foam dressing placed to site by RN. VSS. Patient ambulating, tolerating PO intake, and voiding without any issues. Patient feeling well with no complaints. Patient to follow up with device clinic in 1 week for wound check. Follow up with Dr. Montes in 6 months. Hold Xarelto for 2 more days, then resume. Continue all other home medications.  Discharge plans/instructions discussed with patient and sister who verbalized understanding and agreement of plans of care. No further questions or concern voiced at this time. Patient discharged home in stable condition.     Goals of Care Treatment Preferences:  Code Status:  Full Code  Consults: None    Significant Diagnostic Studies: N/A    Final Active Diagnoses:    Diagnosis Date Noted POA    PRINCIPAL PROBLEM:  Encounter for loop recorder at end of battery life [Z45.09] 06/12/2023 Not Applicable      Problems Resolved During this Admission:     Discharged Condition: stable    Disposition: Home or Self Care    Follow Up:   Follow-up Information     [facility] - Electrophysiology 3rd Fl Follow up in 1 week(s).    Specialty: Electrophysiology  Why: Post ILR removal  Contact information:  [facility]  [facility] [id number] [phone]  Additional information:  [facility] - Main Building - Clinic [place], 3rd Floor   Please park in [facility] and use Clinic elevator           Jose Angel Montes MD Follow up in 6 month(s).    Specialties: Electrophysiology, Cardiovascular Disease, Cardiology  Why: Post ILR removal, AF follow up  Contact information:  [facility]  [facility] [address]  [phone]                       Patient Instructions:      Other restrictions (specify):   Order Comments: Medications:  -HOLD your Xarelto for 2 more days, then RESUME.  -Continue to take all of your other home medications as listed on your medication list after you are discharged.  -If you have problems or side effects caused by your medications, call your Physician.    New Medications:  None.    Diet  -You may resume oral intake after you are discharged, as long you have no swallowing difficulties.    Go to the Emergency Department if you develop:  -Bleeding.  -Weakness or numbness.  -Visual, gait or speech disturbance.  -New chest pain, palpitations, shortness of breath, rapid heart beat, or fainting.  -Fever.    Monitor your site where your Loop Recorder was removed:  1. You should avoid getting your wound wet for 5 days. You may shower in 48 hours. Do not let beam of shower hit site directly and no scrubbing in area. Do not submerge incision site in water  for 2 weeks.    2. You can remove the outside bandage in 48 hours. You should not remove the purple skin glue that covers your wound. This will come off on its own.  3. Every day, take your temperature and check your incision for signs of infection (redness, swelling, drainage, or warmth) for the next 7 days. It is normal to have some pain around the site and some bruising. However constant pain, severe tenderness and pus/drainage coming from your wound is not normal and you should call your physician immediately or seek attention at the ER. Fevers and chills and feeling ill is not normal and you should seek immediate medical attention.    Follow up:  -Wound check in 1 week.  -Dr. Montes in 6 months.     Notify your health care provider if you experience any of the following:  increased confusion or weakness     Notify your health care provider if you experience any of the following:  persistent dizziness, light-headedness, or visual disturbances     Notify your health care provider if you experience any of the following:  worsening rash     Notify your health care provider if you experience any of the following:  severe persistent headache     Notify your health care provider if you experience any of the following:  difficulty breathing or increased cough     Notify your health care provider if you experience any of the following:  redness, tenderness, or signs of infection (pain, swelling, redness, odor or green/yellow discharge around incision site)     Notify your health care provider if you experience any of the following:  severe uncontrolled pain     Notify your health care provider if you experience any of the following:  persistent nausea and vomiting or diarrhea     Remove dressing in 24 hours     Shower on day dressing removed (No bath)     Medications:  Reconciled Home Medications:      Medication List      CONTINUE taking these medications    amLODIPine 10 MG tablet  Commonly known as: NORVASC  TAKE  ONE-HALF (1/2) TABLET DAILY     aspirin 81 MG Chew  TAKE 1 TABLET DAILY     atenoloL 50 MG tablet  Commonly known as: TENORMIN  Take 1 tablet (50 mg total) by mouth once daily.     fluocinonide 0.05% 0.05 % cream  Commonly known as: LIDEX  Apply topically 2 (two) times daily. X 1-2 wks then prn flares only     hydrOXYzine HCL 25 MG tablet  Commonly known as: ATARAX  TAKE 1 TABLET NIGHTLY AS NEEDED FOR ITCHING     rosuvastatin 40 MG Tab  Commonly known as: CRESTOR  TAKE 1 TABLET EVERY EVENING     * triamcinolone acetonide 0.1% 0.1 % ointment  Commonly known as: KENALOG  APPLY TO AFFECTED AREA TWICE A DAY FOR 1-2 WKS THEN AS NEEDED FLARES ONLY     * triamcinolone acetonide 0.1% 0.1 % cream  Commonly known as: KENALOG  AAA BID x 1-2 wks then prn flares only     UNABLE TO FIND  Take 1 tablet by mouth once daily. Cramp Defense. OTC     UNABLE TO FIND  as needed. Cervae Cream     XARELTO 20 mg Tab  Generic drug: rivaroxaban  TAKE 1 TABLET DAILY WITH DINNER OR EVENING MEAL         * This list has 2 medication(s) that are the same as other medications prescribed for you. Read the directions carefully, and ask your doctor or other care provider to review them with you.              Plan:  -Hold Xarelto for 2 more days, then resume  -Continue all other home medications  -Wound check in 1 week  -Follow up with Dr. Montes in 6 months    Time spent on the discharge of patient: 5 minutes    Lindsey Knowles NP  Cardiac Electrophysiology  [facility] -Arrhythmia    Attending: Jose Angel Montes MD

## 2023-06-14 ENCOUNTER — HOSPITAL ENCOUNTER (EMERGENCY)
Facility: HOSPITAL | Age: 68
Discharge: HOME | End: 2023-06-14
Attending: EMERGENCY MEDICINE
Payer: MEDICARE

## 2023-06-14 VITALS
DIASTOLIC BLOOD PRESSURE: 80 MMHG | TEMPERATURE: 99 F | HEART RATE: 87 BPM | SYSTOLIC BLOOD PRESSURE: 134 MMHG | RESPIRATION RATE: 19 BRPM | OXYGEN SATURATION: 96 %

## 2023-06-14 DIAGNOSIS — R19.7 NAUSEA, VOMITING, AND DIARRHEA: ICD-10-CM

## 2023-06-14 DIAGNOSIS — R11.2 NAUSEA, VOMITING, AND DIARRHEA: ICD-10-CM

## 2023-06-14 DIAGNOSIS — R42 DIZZINESS: Primary | ICD-10-CM

## 2023-06-14 LAB
ALBUMIN SERPL BCP-MCNC: 4.3 G/DL (ref 3.5–5.2)
ALP SERPL-CCNC: 64 U/L (ref 55–135)
ALT SERPL W/O P-5'-P-CCNC: 23 U/L (ref 10–44)
ANION GAP SERPL CALC-SCNC: 10 MMOL/L (ref 8–16)
AST SERPL-CCNC: 28 U/L (ref 10–40)
BASOPHILS # BLD AUTO: 0.04 K/UL (ref 0–0.2)
BASOPHILS NFR BLD: 0.4 % (ref 0–1.9)
BILIRUB SERPL-MCNC: 0.9 MG/DL (ref 0.1–1)
BILIRUB UR QL STRIP: NEGATIVE
BNP SERPL-MCNC: 123 PG/ML (ref 0–99)
BUN SERPL-MCNC: 12 MG/DL (ref 8–23)
CALCIUM SERPL-MCNC: 10.2 MG/DL (ref 8.7–10.5)
CHLORIDE SERPL-SCNC: 104 MMOL/L (ref 95–110)
CLARITY UR REFRACT.AUTO: CLEAR
CO2 SERPL-SCNC: 26 MMOL/L (ref 23–29)
COLOR UR AUTO: YELLOW
CREAT SERPL-MCNC: 1.2 MG/DL (ref 0.5–1.4)
DIFFERENTIAL METHOD: ABNORMAL
EOSINOPHIL # BLD AUTO: 0 K/UL (ref 0–0.5)
EOSINOPHIL NFR BLD: 0.1 % (ref 0–8)
ERYTHROCYTE [DISTWIDTH] IN BLOOD BY AUTOMATED COUNT: 14 % (ref 11.5–14.5)
EST. GFR  (NO RACE VARIABLE): >60 ML/MIN/1.73 M^2
GLUCOSE SERPL-MCNC: 110 MG/DL (ref 70–110)
GLUCOSE UR QL STRIP: NEGATIVE
HCT VFR BLD AUTO: 45 % (ref 40–54)
HGB BLD-MCNC: 15.5 G/DL (ref 14–18)
HGB UR QL STRIP: NEGATIVE
IMM GRANULOCYTES # BLD AUTO: 0.03 K/UL (ref 0–0.04)
IMM GRANULOCYTES NFR BLD AUTO: 0.3 % (ref 0–0.5)
KETONES UR QL STRIP: NEGATIVE
LEUKOCYTE ESTERASE UR QL STRIP: NEGATIVE
LYMPHOCYTES # BLD AUTO: 0.6 K/UL (ref 1–4.8)
LYMPHOCYTES NFR BLD: 6.1 % (ref 18–48)
MAGNESIUM SERPL-MCNC: 2 MG/DL (ref 1.6–2.6)
MCH RBC QN AUTO: 27.6 PG (ref 27–31)
MCHC RBC AUTO-ENTMCNC: 34.4 G/DL (ref 32–36)
MCV RBC AUTO: 80 FL (ref 82–98)
MONOCYTES # BLD AUTO: 0.4 K/UL (ref 0.3–1)
MONOCYTES NFR BLD: 3.5 % (ref 4–15)
NEUTROPHILS # BLD AUTO: 9.4 K/UL (ref 1.8–7.7)
NEUTROPHILS NFR BLD: 89.6 % (ref 38–73)
NITRITE UR QL STRIP: NEGATIVE
NRBC BLD-RTO: 0 /100 WBC
PH UR STRIP: 7 [PH] (ref 5–8)
PLATELET # BLD AUTO: 215 K/UL (ref 150–450)
PMV BLD AUTO: 10.9 FL (ref 9.2–12.9)
POTASSIUM SERPL-SCNC: 4.9 MMOL/L (ref 3.5–5.1)
PROT SERPL-MCNC: 7.7 G/DL (ref 6–8.4)
PROT UR QL STRIP: ABNORMAL
RBC # BLD AUTO: 5.62 M/UL (ref 4.6–6.2)
SODIUM SERPL-SCNC: 140 MMOL/L (ref 136–145)
SP GR UR STRIP: 1.02 (ref 1–1.03)
TROPONIN I SERPL DL<=0.01 NG/ML-MCNC: <0.006 NG/ML (ref 0–0.03)
TSH SERPL DL<=0.005 MIU/L-ACNC: 0.74 UIU/ML (ref 0.4–4)
URN SPEC COLLECT METH UR: ABNORMAL
WBC # BLD AUTO: 10.52 K/UL (ref 3.9–12.7)

## 2023-06-14 PROCEDURE — 99284 EMERGENCY DEPT VISIT MOD MDM: CPT | Mod: ,,, | Performed by: EMERGENCY MEDICINE

## 2023-06-14 PROCEDURE — 93005 ELECTROCARDIOGRAM TRACING: CPT

## 2023-06-14 PROCEDURE — 83880 ASSAY OF NATRIURETIC PEPTIDE: CPT | Performed by: EMERGENCY MEDICINE

## 2023-06-14 PROCEDURE — 83735 ASSAY OF MAGNESIUM: CPT | Performed by: EMERGENCY MEDICINE

## 2023-06-14 PROCEDURE — 80053 COMPREHEN METABOLIC PANEL: CPT | Performed by: EMERGENCY MEDICINE

## 2023-06-14 PROCEDURE — 84484 ASSAY OF TROPONIN QUANT: CPT | Performed by: EMERGENCY MEDICINE

## 2023-06-14 PROCEDURE — 99284 EMERGENCY DEPT VISIT MOD MDM: CPT | Mod: 25

## 2023-06-14 PROCEDURE — 84443 ASSAY THYROID STIM HORMONE: CPT | Performed by: EMERGENCY MEDICINE

## 2023-06-14 PROCEDURE — 81003 URINALYSIS AUTO W/O SCOPE: CPT | Performed by: EMERGENCY MEDICINE

## 2023-06-14 PROCEDURE — 93010 EKG 12-LEAD: ICD-10-PCS | Mod: ,,, | Performed by: INTERNAL MEDICINE

## 2023-06-14 PROCEDURE — 96360 HYDRATION IV INFUSION INIT: CPT

## 2023-06-14 PROCEDURE — 25000003 PHARM REV CODE 250: Performed by: EMERGENCY MEDICINE

## 2023-06-14 PROCEDURE — 93010 ELECTROCARDIOGRAM REPORT: CPT | Mod: ,,, | Performed by: INTERNAL MEDICINE

## 2023-06-14 PROCEDURE — 85025 COMPLETE CBC W/AUTO DIFF WBC: CPT | Performed by: EMERGENCY MEDICINE

## 2023-06-14 PROCEDURE — 99284 PR EMERGENCY DEPT VISIT,LEVEL IV: ICD-10-PCS | Mod: ,,, | Performed by: EMERGENCY MEDICINE

## 2023-06-14 RX ORDER — MECLIZINE HCL 12.5 MG 12.5 MG/1
25 TABLET ORAL
Status: COMPLETED | OUTPATIENT
Start: 2023-06-14 | End: 2023-06-14

## 2023-06-14 RX ORDER — DIPHENOXYLATE HYDROCHLORIDE AND ATROPINE SULFATE 2.5; .025 MG/1; MG/1
1 TABLET ORAL 4 TIMES DAILY PRN
Qty: 20 TABLET | Refills: 0 | Status: ON HOLD | OUTPATIENT
Start: 2023-06-14 | End: 2023-06-20 | Stop reason: HOSPADM

## 2023-06-14 RX ORDER — ONDANSETRON 4 MG/1
4 TABLET, ORALLY DISINTEGRATING ORAL EVERY 6 HOURS PRN
Qty: 10 TABLET | Refills: 0 | Status: SHIPPED | OUTPATIENT
Start: 2023-06-14

## 2023-06-14 RX ADMIN — SODIUM CHLORIDE 1000 ML: 9 INJECTION, SOLUTION INTRAVENOUS at 06:06

## 2023-06-14 RX ADMIN — MECLIZINE HYDROCHLORIDE 25 MG: 12.5 TABLET ORAL at 06:06

## 2023-06-14 NOTE — ED NOTES
Pt in hospital gown, on cardiac monitor, belongings labeled, side rails up x2, call light in reach, white board updated, urinal at bedside. Pt encouraged to call for assistance if needed. Care on going

## 2023-06-14 NOTE — ED TRIAGE NOTES
Pa 68 yr old male Aox4 arrived to er via ems with c/o dizziness, weakness, nausea, vomiting, and diarrhea that started this morning. Pt also states he fell during dizzy episode, denies hitting head, LOC. Pt reports hx of afib and recent removal of loop recorder done this past Monday. Pt denies fever, chills, chest pain, abd pain.

## 2023-06-14 NOTE — ED PROVIDER NOTES
Encounter Date: 6/14/2023       History     Chief Complaint   Patient presents with    Dizziness     Had loop recorder removed Monday, has dizziness today no other deficits.  PMH a-fib, HTN, HLD, past stroke. 4mg Zofran PTA.    Nausea     History obtained from the patient without limitations.  68-year-old with the below past medical history complains of dizziness, nausea, vomiting, and diarrhea that began suddenly at 10:00.  He began to feel better after about an hour but has persistent lightheadedness.  He denies vision changes, syncope, chest pain, palpitations, abdominal pain, and focal weakness and numbness throughout his body.  He had a loop recorder placed before the COVID-19 pandemic. The device was removed 2 day ago. He has been holding his Xarelto since last week and will restart it tomorrow.       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    Hypertension      Past Surgical History:   Procedure Laterality Date    RADIOFREQUENCY ABLATION  04/06/2018    Cryo PVI    REMOVAL OF IMPLANTABLE LOOP RECORDER N/A 6/12/2023    Procedure: REMOVAL, IMPLANTABLE LOOP RECORDER;  Surgeon: Jose Angel Montes MD;  Location: [facility] EP LAB;  Service: Cardiology;  Laterality: N/A;  RAGINI, ILR Removal, [facility] marito BEASLEY MB, 3 Prep     Family History   Problem Relation Age of Onset    Melanoma Neg Hx      Social History     Tobacco Use    Smoking status: Never    Smokeless tobacco: Never   Substance Use Topics    Alcohol use: Yes     Comment: socially    Drug use: No     Review of Systems  See HPI.  Remainder of 10 point systems review negative.    Physical Exam     Initial Vitals [06/14/23 1440]   BP Pulse Resp Temp SpO2   116/82 88 16 98.8 °F (37.1 °C) 98 %      MAP       --         Physical Exam    Nursing note and vitals  reviewed.  Constitutional: He is not diaphoretic. No distress.   HENT:   Head: Normocephalic and atraumatic.   Mouth/Throat: Oropharynx is clear and moist.   Eyes: Conjunctivae and EOM are normal. Pupils are equal, round, and reactive to light. No scleral icterus.   No nystagmus.   Neck: No JVD present.   No carotid bruits.   Cardiovascular:  Normal rate, regular rhythm and normal heart sounds.     Exam reveals no gallop and no friction rub.       No murmur heard.  Pulmonary/Chest: Effort normal and breath sounds normal. No stridor. No respiratory distress. He has no decreased breath sounds. He has no wheezes. He has no rhonchi. He has no rales.   Abdominal: Abdomen is soft. He exhibits no distension and no mass. There is no abdominal tenderness.   Musculoskeletal:         General: No edema.     Neurological: He is alert and oriented to person, place, and time. He has normal strength. No cranial nerve deficit. GCS score is 15. GCS eye subscore is 4. GCS verbal subscore is 5. GCS motor subscore is 6.   Normal bilateral finger-to-nose and heel-to-shin tests.   Skin: Skin is warm and dry. No pallor.       ED Course   Procedures  Labs Reviewed   CBC W/ AUTO DIFFERENTIAL - Abnormal; Notable for the following components:       Result Value    MCV 80 (*)     Gran # (ANC) 9.4 (*)     Lymph # 0.6 (*)     Gran % 89.6 (*)     Lymph % 6.1 (*)     Mono % 3.5 (*)     All other components within normal limits   URINALYSIS, REFLEX TO URINE CULTURE - Abnormal; Notable for the following components:    Protein, UA Trace (*)     All other components within normal limits    Narrative:     Specimen Source->Urine   B-TYPE NATRIURETIC PEPTIDE - Abnormal; Notable for the following components:     (*)     All other components within normal limits   COMPREHENSIVE METABOLIC PANEL   TSH   TROPONIN I   MAGNESIUM          Imaging Results    None          Medications   meclizine tablet 25 mg (25 mg Oral Given 6/14/23 1804)   sodium chloride  0.9% bolus 1,000 mL 1,000 mL (0 mLs Intravenous Stopped 6/14/23 1916)     Medical Decision Making:   Independently Interpreted Test(s):   I have ordered and independently interpreted EKG Reading(s) - see prior notes  Clinical Tests:   Lab Tests: Reviewed  Medical Tests: Reviewed    MDM:  Urgent evaluation.  Patient was afebrile with normal initial vitals.  Vitals remained stable.  He had no focal neurological deficits.  Sudden onset of dizziness, nausea, vomiting, and diarrhea were most consistent with a gastrointestinal process leading to acute volume loss.  He was treated with IV fluids and Antivert.  Workup included cardiopulmonary monitoring, serial exams, EKG, and labs.  EKG revealed atrial fibrillation with normal ventricular rate.  He has chronic atrial fibrillation.  He has been off of Xarelto for a week due to loop recorder removal earlier this week, so acute stroke was considered but felt to be less likely.  He was monitored for several hours and discharged with clinical improvement.           ED Course as of 06/14/23 2032   Wed Jun 14, 2023   1634 Independent interpretation.    Atrial fibrillation.  Ventricular rate 82 beats per minute.  Normal axis.  Normal QRS and QT intervals.  No ST segment elevation or depression. [LP]      ED Course User Index  [LP] Estevan Wadsworth III, MD                 Clinical Impression:   Final diagnoses:  [R42] Dizziness (Primary)  [R11.2, R19.7] Nausea, vomiting, and diarrhea        ED Disposition Condition    Discharge Stable          ED Prescriptions       Medication Sig Dispense Start Date End Date Auth. Provider    ondansetron (ZOFRAN-ODT) 4 MG TbDL Take 1 tablet (4 mg total) by mouth every 6 (six) hours as needed (nausea). 10 tablet 6/14/2023 -- Estevan Wadsworth III, MD    diphenoxylate-atropine 2.5-0.025 mg (LOMOTIL) 2.5-0.025 mg per tablet Take 1 tablet by mouth 4 (four) times daily as needed for Diarrhea. 20 tablet 6/14/2023 -- Estevan Wadsworth III, MD          Follow-up  Information       Follow up With Specialties Details Why Contact Info    Your primary care provider   Schedule a close ER follow-up visit.     ER   Return to the ER for worsened dizziness, nausea and vomiting that do not improve with the prescribed medication, or for any other immediate concerns.              Estevan Wadsworth III, MD  06/14/23 2038

## 2023-06-14 NOTE — PROVIDER PROGRESS NOTES - EMERGENCY DEPT.
Encounter Date: 6/14/2023    ED Physician Progress Notes         EKG - STEMI Decision  Initial Reading: No STEMI present (Chronic AFib, rate controlled).  Response: No Action Needed.

## 2023-06-15 NOTE — DISCHARGE INSTRUCTIONS
Thank you for allowing us to participate in your healthcare needs. We really appreciate it and hope that the care we provided was satisfactory.    - Dr. Wadsworth

## 2023-06-19 ENCOUNTER — HOSPITAL ENCOUNTER (INPATIENT)
Facility: HOSPITAL | Age: 68
LOS: 1 days | Discharge: HOME | DRG: 065 | End: 2023-06-20
Attending: EMERGENCY MEDICINE | Admitting: PSYCHIATRY & NEUROLOGY
Payer: MEDICARE

## 2023-06-19 ENCOUNTER — CLINICAL SUPPORT (OUTPATIENT)
Dept: CARDIOLOGY | Facility: HOSPITAL | Age: 68
End: 2023-06-19
Attending: INTERNAL MEDICINE
Payer: MEDICARE

## 2023-06-19 ENCOUNTER — DOCUMENTATION ONLY (OUTPATIENT)
Dept: CARDIOLOGY | Facility: HOSPITAL | Age: 68
End: 2023-06-19
Payer: MEDICARE

## 2023-06-19 DIAGNOSIS — I63.9 STROKE: ICD-10-CM

## 2023-06-19 DIAGNOSIS — I63.9 CEREBELLAR STROKE: ICD-10-CM

## 2023-06-19 DIAGNOSIS — Z98.890 HISTORY OF LOOP RECORDER: ICD-10-CM

## 2023-06-19 DIAGNOSIS — I63.542 OCCLUSION OF LEFT POSTERIOR INFERIOR CEREBELLAR ARTERY WITH INFARCTION: Primary | ICD-10-CM

## 2023-06-19 DIAGNOSIS — R42 DIZZINESS: ICD-10-CM

## 2023-06-19 PROBLEM — I63.549: Status: ACTIVE | Noted: 2023-06-19

## 2023-06-19 LAB
ALBUMIN SERPL BCP-MCNC: 3.7 G/DL (ref 3.5–5.2)
ALP SERPL-CCNC: 62 U/L (ref 55–135)
ALT SERPL W/O P-5'-P-CCNC: 38 U/L (ref 10–44)
ANION GAP SERPL CALC-SCNC: 10 MMOL/L (ref 8–16)
AST SERPL-CCNC: 47 U/L (ref 10–40)
BASOPHILS # BLD AUTO: 0.05 K/UL (ref 0–0.2)
BASOPHILS NFR BLD: 0.7 % (ref 0–1.9)
BILIRUB SERPL-MCNC: 1.4 MG/DL (ref 0.1–1)
BNP SERPL-MCNC: 81 PG/ML (ref 0–99)
BUN SERPL-MCNC: 18 MG/DL (ref 8–23)
CALCIUM SERPL-MCNC: 9.6 MG/DL (ref 8.7–10.5)
CHLORIDE SERPL-SCNC: 103 MMOL/L (ref 95–110)
CO2 SERPL-SCNC: 24 MMOL/L (ref 23–29)
CREAT SERPL-MCNC: 1.5 MG/DL (ref 0.5–1.4)
DIFFERENTIAL METHOD: ABNORMAL
EOSINOPHIL # BLD AUTO: 0.1 K/UL (ref 0–0.5)
EOSINOPHIL NFR BLD: 1.9 % (ref 0–8)
ERYTHROCYTE [DISTWIDTH] IN BLOOD BY AUTOMATED COUNT: 13.8 % (ref 11.5–14.5)
EST. GFR  (NO RACE VARIABLE): 49.8 ML/MIN/1.73 M^2
GLUCOSE SERPL-MCNC: 88 MG/DL (ref 70–110)
HCT VFR BLD AUTO: 43 % (ref 40–54)
HCV AB SERPL QL IA: NORMAL
HGB BLD-MCNC: 15.1 G/DL (ref 14–18)
HIV 1+2 AB+HIV1 P24 AG SERPL QL IA: NORMAL
IMM GRANULOCYTES # BLD AUTO: 0.03 K/UL (ref 0–0.04)
IMM GRANULOCYTES NFR BLD AUTO: 0.4 % (ref 0–0.5)
LYMPHOCYTES # BLD AUTO: 1.6 K/UL (ref 1–4.8)
LYMPHOCYTES NFR BLD: 21.1 % (ref 18–48)
MCH RBC QN AUTO: 27.9 PG (ref 27–31)
MCHC RBC AUTO-ENTMCNC: 35.1 G/DL (ref 32–36)
MCV RBC AUTO: 79 FL (ref 82–98)
MONOCYTES # BLD AUTO: 0.8 K/UL (ref 0.3–1)
MONOCYTES NFR BLD: 10.5 % (ref 4–15)
NEUTROPHILS # BLD AUTO: 4.9 K/UL (ref 1.8–7.7)
NEUTROPHILS NFR BLD: 65.4 % (ref 38–73)
NRBC BLD-RTO: 0 /100 WBC
PLATELET # BLD AUTO: 225 K/UL (ref 150–450)
PMV BLD AUTO: 11.4 FL (ref 9.2–12.9)
POTASSIUM SERPL-SCNC: 3.7 MMOL/L (ref 3.5–5.1)
PROT SERPL-MCNC: 7.4 G/DL (ref 6–8.4)
RBC # BLD AUTO: 5.42 M/UL (ref 4.6–6.2)
SODIUM SERPL-SCNC: 137 MMOL/L (ref 136–145)
TROPONIN I SERPL DL<=0.01 NG/ML-MCNC: <0.006 NG/ML (ref 0–0.03)
WBC # BLD AUTO: 7.49 K/UL (ref 3.9–12.7)

## 2023-06-19 PROCEDURE — 93005 ELECTROCARDIOGRAM TRACING: CPT

## 2023-06-19 PROCEDURE — 86803 HEPATITIS C AB TEST: CPT | Performed by: PHYSICIAN ASSISTANT

## 2023-06-19 PROCEDURE — 87389 HIV-1 AG W/HIV-1&-2 AB AG IA: CPT | Performed by: PHYSICIAN ASSISTANT

## 2023-06-19 PROCEDURE — 11000001 HC ACUTE MED/SURG PRIVATE ROOM

## 2023-06-19 PROCEDURE — 93010 EKG 12-LEAD: ICD-10-PCS | Mod: ,,, | Performed by: INTERNAL MEDICINE

## 2023-06-19 PROCEDURE — 99285 EMERGENCY DEPT VISIT HI MDM: CPT | Mod: 25

## 2023-06-19 PROCEDURE — 99291 CRITICAL CARE FIRST HOUR: CPT | Mod: ,,, | Performed by: EMERGENCY MEDICINE

## 2023-06-19 PROCEDURE — 83880 ASSAY OF NATRIURETIC PEPTIDE: CPT | Performed by: STUDENT IN AN ORGANIZED HEALTH CARE EDUCATION/TRAINING PROGRAM

## 2023-06-19 PROCEDURE — 80053 COMPREHEN METABOLIC PANEL: CPT | Performed by: STUDENT IN AN ORGANIZED HEALTH CARE EDUCATION/TRAINING PROGRAM

## 2023-06-19 PROCEDURE — 25000003 PHARM REV CODE 250: Performed by: STUDENT IN AN ORGANIZED HEALTH CARE EDUCATION/TRAINING PROGRAM

## 2023-06-19 PROCEDURE — 96360 HYDRATION IV INFUSION INIT: CPT

## 2023-06-19 PROCEDURE — 99291 PR CRITICAL CARE, E/M 30-74 MINUTES: ICD-10-PCS | Mod: ,,, | Performed by: EMERGENCY MEDICINE

## 2023-06-19 PROCEDURE — 99223 1ST HOSP IP/OBS HIGH 75: CPT | Mod: AI,GC,, | Performed by: PSYCHIATRY & NEUROLOGY

## 2023-06-19 PROCEDURE — 93010 ELECTROCARDIOGRAM REPORT: CPT | Mod: ,,, | Performed by: INTERNAL MEDICINE

## 2023-06-19 PROCEDURE — 85025 COMPLETE CBC W/AUTO DIFF WBC: CPT | Performed by: STUDENT IN AN ORGANIZED HEALTH CARE EDUCATION/TRAINING PROGRAM

## 2023-06-19 PROCEDURE — 99223 PR INITIAL HOSPITAL CARE,LEVL III: ICD-10-PCS | Mod: AI,GC,, | Performed by: PSYCHIATRY & NEUROLOGY

## 2023-06-19 PROCEDURE — 84484 ASSAY OF TROPONIN QUANT: CPT | Performed by: STUDENT IN AN ORGANIZED HEALTH CARE EDUCATION/TRAINING PROGRAM

## 2023-06-19 RX ORDER — MECLIZINE HCL 12.5 MG 12.5 MG/1
25 TABLET ORAL
Status: COMPLETED | OUTPATIENT
Start: 2023-06-19 | End: 2023-06-19

## 2023-06-19 RX ORDER — AMLODIPINE BESYLATE 5 MG/1
5 TABLET ORAL DAILY
Status: DISCONTINUED | OUTPATIENT
Start: 2023-06-20 | End: 2023-06-20 | Stop reason: HOSPADM

## 2023-06-19 RX ORDER — ATORVASTATIN CALCIUM 40 MG/1
80 TABLET, FILM COATED ORAL DAILY
Status: DISCONTINUED | OUTPATIENT
Start: 2023-06-20 | End: 2023-06-20 | Stop reason: HOSPADM

## 2023-06-19 RX ORDER — SODIUM CHLORIDE 0.9 % (FLUSH) 0.9 %
10 SYRINGE (ML) INJECTION
Status: DISCONTINUED | OUTPATIENT
Start: 2023-06-19 | End: 2023-06-20 | Stop reason: HOSPADM

## 2023-06-19 RX ORDER — LABETALOL HCL 20 MG/4 ML
10 SYRINGE (ML) INTRAVENOUS
Status: DISCONTINUED | OUTPATIENT
Start: 2023-06-19 | End: 2023-06-20 | Stop reason: HOSPADM

## 2023-06-19 RX ORDER — ATENOLOL 50 MG/1
50 TABLET ORAL DAILY
Status: DISCONTINUED | OUTPATIENT
Start: 2023-06-20 | End: 2023-06-20 | Stop reason: HOSPADM

## 2023-06-19 RX ADMIN — RIVAROXABAN 20 MG: 20 TABLET, FILM COATED ORAL at 05:06

## 2023-06-19 RX ADMIN — MECLIZINE HYDROCHLORIDE 25 MG: 12.5 TABLET ORAL at 12:06

## 2023-06-19 RX ADMIN — SODIUM CHLORIDE 1000 ML: 9 INJECTION, SOLUTION INTRAVENOUS at 01:06

## 2023-06-19 NOTE — ED PROVIDER NOTES
Encounter Date: 6/19/2023       History     Chief Complaint   Patient presents with    Dizziness     Loop removed last week, seen rec for dizziness and it continues     68-year-old male with PMH of hypertension and AFib on Xarelto presents with dizziness.  Patient states he had a loop recorder removed about a week ago and afterwards developed some room spinning dizziness that is present when he stands up or walks.  He was evaluated in the ED a few days ago with an unremarkable workup.  Every time he stands up he gets room spinning dizziness and feels off balance.  He states the other day he fell forward and struck his head on the ground.  Denies LOC. he denies any muscle weakness, slurred speech, difficulty speaking, numbness/tingling of the extremities.  He was not taking his Xarelto for a little bit surrounding the procedure.    The history is provided by the patient and medical records.   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    Hypertension      Past Surgical History:   Procedure Laterality Date    RADIOFREQUENCY ABLATION  04/06/2018    Cryo PVI    REMOVAL OF IMPLANTABLE LOOP RECORDER N/A 6/12/2023    Procedure: REMOVAL, IMPLANTABLE LOOP RECORDER;  Surgeon: Jose Angel Montes MD;  Location: [facility] EP LAB;  Service: Cardiology;  Laterality: N/A;  RAGINI, ILR Removal, LINDSEY BEASLEY, DANIAL devi, 3 Prep     Family History   Problem Relation Age of Onset    Melanoma Neg Hx      Social History     Tobacco Use    Smoking status: Never    Smokeless tobacco: Never   Substance Use Topics    Alcohol use: Yes     Comment: socially    Drug use: No     Review of Systems    Physical Exam     Initial Vitals [06/19/23 1025]   BP Pulse Resp Temp SpO2   127/75 87 18 99 °F (37.2 °C) 98 %      MAP       --         Physical Exam    Nursing note and  vitals reviewed.  Constitutional: He appears well-developed and well-nourished. He is not diaphoretic. No distress.   HENT:   Head: Normocephalic and atraumatic.   Eyes: Conjunctivae and EOM are normal. Pupils are equal, round, and reactive to light.   Neck: Neck supple.   Normal range of motion.  Cardiovascular:  Normal rate, regular rhythm, normal heart sounds and intact distal pulses.           No murmur heard.  Pulmonary/Chest: Breath sounds normal. No respiratory distress. He has no wheezes. He has no rhonchi. He has no rales.   Abdominal: Abdomen is soft. He exhibits no distension. There is no abdominal tenderness. There is no rebound and no guarding.   Musculoskeletal:         General: No tenderness or edema.      Cervical back: Normal range of motion and neck supple.     Neurological: He is alert and oriented to person, place, and time. He has normal strength. No sensory deficit. GCS score is 15. GCS eye subscore is 4. GCS verbal subscore is 5. GCS motor subscore is 6.   Skin: Skin is warm and dry. Capillary refill takes less than 2 seconds.       ED Course   Procedures  Labs Reviewed   CBC W/ AUTO DIFFERENTIAL - Abnormal; Notable for the following components:       Result Value    MCV 79 (*)     All other components within normal limits   COMPREHENSIVE METABOLIC PANEL - Abnormal; Notable for the following components:    Creatinine 1.5 (*)     Total Bilirubin 1.4 (*)     AST 47 (*)     eGFR 49.8 (*)     All other components within normal limits   HIV 1 / 2 ANTIBODY    Narrative:     Release to patient->Immediate   HEPATITIS C ANTIBODY    Narrative:     Release to patient->Immediate   TROPONIN I   B-TYPE NATRIURETIC PEPTIDE     EKG Readings: (Independently Interpreted)   Atrial fibrillation at a rate of 84, no STEMI   ECG Results              EKG 12-lead (Final result)  Result time 06/19/23 13:19:29      Final result by Interface, Lab [facility] (06/19/23 13:19:29)                   Narrative:    Test Reason  : R42,    Vent. Rate : 084 BPM     Atrial Rate : 000 BPM     P-R Int : 000 ms          QRS Dur : 096 ms      QT Int : 344 ms       P-R-T Axes : 000 078 -52 degrees     QTc Int : 406 ms    Atrial fibrillation  Nonspecific T wave abnormality  Abnormal ECG  When compared with ECG of 14-JUN-2023 15:07,  No significant change was found  Confirmed by KAITLYNN GR MD ([id number]) on 6/19/2023 1:19:20 PM    Referred By: [email or web address]   SELF           Confirmed By:KAITLYNN GR MD                                  Imaging Results               MRA Neck without contrast (Final result)  Result time 06/19/23 15:33:47      Final result by Vinicius Burton DO (06/19/23 15:33:47)                   Impression:      Moderate size signal abnormality left posteroinferior cerebellum most compatible with acute/recent infarction correspond to abnormality seen on CT.  No significant mass effect or hemorrhagic conversion    Stable heterogeneous signal focus left brachium pontis most compatible with cavernous malformation without evidence for recent hemorrhage    MRA head: Absent visualization left posteroinferior cerebellar artery concerning for its occlusion.    Otherwise unremarkable MRA head as detailed above    MRA neck: Unremarkable noncontrast MRA neck without evidence for significant arterial stenosis throughout the neck as detailed above.      Electronically signed by: Vinicius Burton DO  Date:    06/19/2023  Time:    15:33               Narrative:    EXAMINATION:  MRI BRAIN WITHOUT CONTRAST; MRA NECK WITHOUT CONTRAST; MRA BRAIN WITHOUT CONTRAST    CLINICAL HISTORY:  Dizziness, non-specific;; Dizziness, persistent/recurrent, cardiac or vascular cause suspected;    COMPARISON:  MRI brain and CT 08/04/2017 and CT earlier same day 06/19/2023    TECHNIQUE:  MRI brain: Sagittal and axial T1, axial T2, axial FLAIR, axial gradient and axial diffusion imaging the whole brain without contrast    MRA head: 3D time-of-flight MRA head with 3 dimensional  MIP reformats    MRA neck: Noncontrast 2D time-of-flight MRA of the neck with 3 dimensional MIP reformats.    FINDINGS:  MRI brain: Moderate size region of diffusion restriction within the left cerebellum with corresponding T2 flair signal hyperintensity compatible with acute/recent infarction this corresponds to hypodensity seen on CT    There is slight localized mass effect without midline shift or hydrocephalus    Superimposed ovoid heterogeneous signal focus in the left brachium pontis compatible with cavernous malformation. No evidence for recent hemorrhage    No new parenchymal susceptibility    Ventricles stable without hydrocephalus    Major intracranial skull base T2 flow voids are present    Small encephalomalacia right cerebellum compatible with remote infarction    Moderate to severe ethmoid air cell opacification bilaterally with moderate mucosal thickening and layering air-fluid levels in the maxillary antra and sphenoid sinuses left greater than right. Nonspecific sclerotic focus within the inferior C4 vertebral body which may represent bone island and similar but only partially included on the prior exam.    MRA head:    Anterior circulation:    Bilateral distal cervical, petrous, cavernous and supraclinoid segments of the ICAs as well as the visualized anterior and middle cerebral arteries are patent without significant focal stenosis or definite aneurysm.    Posterior circulation: The distal vertebral arteries, basilar artery and posterior cerebral arteries are patent without focal stenosis.    The left posteroinferior cerebellar artery is not seen and may be occluded.    MRA neck:    Study slightly distorted by patient motion artifacts.  Arch and origin great vessels from the arch are within normal limits.  Origin the vertebral arteries from the respective subclavian arteries are within normal limits.  Vertebral arteries are patent throughout their course without focal stenosis.    Bilateral  common carotid arteries, carotid bifurcations and extracranial portions of the internal carotid arteries are patent without focal stenosis.  Less than 50% proximal ICA stenosis by NASCET criteria    This report was flagged in Epic as abnormal.                                        MRA Brain without contrast (Final result)  Result time 06/19/23 15:33:47      Final result by Vinicius Burton DO (06/19/23 15:33:47)                   Impression:      Moderate size signal abnormality left posteroinferior cerebellum most compatible with acute/recent infarction correspond to abnormality seen on CT.  No significant mass effect or hemorrhagic conversion    Stable heterogeneous signal focus left brachium pontis most compatible with cavernous malformation without evidence for recent hemorrhage    MRA head: Absent visualization left posteroinferior cerebellar artery concerning for its occlusion.    Otherwise unremarkable MRA head as detailed above    MRA neck: Unremarkable noncontrast MRA neck without evidence for significant arterial stenosis throughout the neck as detailed above.      Electronically signed by: Vinicius Burton DO  Date:    06/19/2023  Time:    15:33               Narrative:    EXAMINATION:  MRI BRAIN WITHOUT CONTRAST; MRA NECK WITHOUT CONTRAST; MRA BRAIN WITHOUT CONTRAST    CLINICAL HISTORY:  Dizziness, non-specific;; Dizziness, persistent/recurrent, cardiac or vascular cause suspected;    COMPARISON:  MRI brain and CT 08/04/2017 and CT earlier same day 06/19/2023    TECHNIQUE:  MRI brain: Sagittal and axial T1, axial T2, axial FLAIR, axial gradient and axial diffusion imaging the whole brain without contrast    MRA head: 3D time-of-flight MRA head with 3 dimensional MIP reformats    MRA neck: Noncontrast 2D time-of-flight MRA of the neck with 3 dimensional MIP reformats.    FINDINGS:  MRI brain: Moderate size region of diffusion restriction within the left cerebellum with corresponding T2 flair signal  hyperintensity compatible with acute/recent infarction this corresponds to hypodensity seen on CT    There is slight localized mass effect without midline shift or hydrocephalus    Superimposed ovoid heterogeneous signal focus in the left brachium pontis compatible with cavernous malformation. No evidence for recent hemorrhage    No new parenchymal susceptibility    Ventricles stable without hydrocephalus    Major intracranial skull base T2 flow voids are present    Small encephalomalacia right cerebellum compatible with remote infarction    Moderate to severe ethmoid air cell opacification bilaterally with moderate mucosal thickening and layering air-fluid levels in the maxillary antra and sphenoid sinuses left greater than right. Nonspecific sclerotic focus within the inferior C4 vertebral body which may represent bone island and similar but only partially included on the prior exam.    MRA head:    Anterior circulation:    Bilateral distal cervical, petrous, cavernous and supraclinoid segments of the ICAs as well as the visualized anterior and middle cerebral arteries are patent without significant focal stenosis or definite aneurysm.    Posterior circulation: The distal vertebral arteries, basilar artery and posterior cerebral arteries are patent without focal stenosis.    The left posteroinferior cerebellar artery is not seen and may be occluded.    MRA neck:    Study slightly distorted by patient motion artifacts.  Arch and origin great vessels from the arch are within normal limits.  Origin the vertebral arteries from the respective subclavian arteries are within normal limits.  Vertebral arteries are patent throughout their course without focal stenosis.    Bilateral common carotid arteries, carotid bifurcations and extracranial portions of the internal carotid arteries are patent without focal stenosis.  Less than 50% proximal ICA stenosis by NASCET criteria    This report was flagged in Epic as abnormal.                                         MRI Brain Without Contrast (Final result)  Result time 06/19/23 15:33:47      Final result by Vinicius Burton DO (06/19/23 15:33:47)                   Impression:      Moderate size signal abnormality left posteroinferior cerebellum most compatible with acute/recent infarction correspond to abnormality seen on CT.  No significant mass effect or hemorrhagic conversion    Stable heterogeneous signal focus left brachium pontis most compatible with cavernous malformation without evidence for recent hemorrhage    MRA head: Absent visualization left posteroinferior cerebellar artery concerning for its occlusion.    Otherwise unremarkable MRA head as detailed above    MRA neck: Unremarkable noncontrast MRA neck without evidence for significant arterial stenosis throughout the neck as detailed above.      Electronically signed by: Vinicius Burton DO  Date:    06/19/2023  Time:    15:33               Narrative:    EXAMINATION:  MRI BRAIN WITHOUT CONTRAST; MRA NECK WITHOUT CONTRAST; MRA BRAIN WITHOUT CONTRAST    CLINICAL HISTORY:  Dizziness, non-specific;; Dizziness, persistent/recurrent, cardiac or vascular cause suspected;    COMPARISON:  MRI brain and CT 08/04/2017 and CT earlier same day 06/19/2023    TECHNIQUE:  MRI brain: Sagittal and axial T1, axial T2, axial FLAIR, axial gradient and axial diffusion imaging the whole brain without contrast    MRA head: 3D time-of-flight MRA head with 3 dimensional MIP reformats    MRA neck: Noncontrast 2D time-of-flight MRA of the neck with 3 dimensional MIP reformats.    FINDINGS:  MRI brain: Moderate size region of diffusion restriction within the left cerebellum with corresponding T2 flair signal hyperintensity compatible with acute/recent infarction this corresponds to hypodensity seen on CT    There is slight localized mass effect without midline shift or hydrocephalus    Superimposed ovoid heterogeneous signal focus in the left brachium pontis  compatible with cavernous malformation. No evidence for recent hemorrhage    No new parenchymal susceptibility    Ventricles stable without hydrocephalus    Major intracranial skull base T2 flow voids are present    Small encephalomalacia right cerebellum compatible with remote infarction    Moderate to severe ethmoid air cell opacification bilaterally with moderate mucosal thickening and layering air-fluid levels in the maxillary antra and sphenoid sinuses left greater than right. Nonspecific sclerotic focus within the inferior C4 vertebral body which may represent bone island and similar but only partially included on the prior exam.    MRA head:    Anterior circulation:    Bilateral distal cervical, petrous, cavernous and supraclinoid segments of the ICAs as well as the visualized anterior and middle cerebral arteries are patent without significant focal stenosis or definite aneurysm.    Posterior circulation: The distal vertebral arteries, basilar artery and posterior cerebral arteries are patent without focal stenosis.    The left posteroinferior cerebellar artery is not seen and may be occluded.    MRA neck:    Study slightly distorted by patient motion artifacts.  Arch and origin great vessels from the arch are within normal limits.  Origin the vertebral arteries from the respective subclavian arteries are within normal limits.  Vertebral arteries are patent throughout their course without focal stenosis.    Bilateral common carotid arteries, carotid bifurcations and extracranial portions of the internal carotid arteries are patent without focal stenosis.  Less than 50% proximal ICA stenosis by NASCET criteria    This report was flagged in Epic as abnormal.                                        CT Head Without Contrast (Final result)  Result time 06/19/23 15:18:43      Final result by Vinicius Butron DO (06/19/23 15:18:43)                   Impression:      Moderate region of hypoattenuation and fullness in  the left posterior cerebellum concerning for area of recent infarction.    No significant mass effect or hemorrhagic conversion    Continued small hyperdensity left brachium pontis corresponds to known cavernous malformation seen on MRI.  Allowing for this there is no definite acute intracranial hemorrhage    Clinical correlation and further evaluation with MRI if patient compatible    Moderate patchy paranasal sinus disease.      Electronically signed by: Vinicius Burton DO  Date:    06/19/2023  Time:    15:18               Narrative:    EXAMINATION:  CT HEAD WITHOUT CONTRAST    CLINICAL HISTORY:  Head trauma, minor (Age >= 65y);    TECHNIQUE:  Multiple sequential 5 mm axial images of the head without contrast.  Coronal and sagittal reformatted imaging from the axial acquisition.    COMPARISON:  MRI and CT 08/04/2017    FINDINGS:  Hyperdensity within the left brachium pontis corresponds to cavernous malformation seen on prior MRI.  There is no evidence for acute intracranial hemorrhage.  The ventricles are similar without hydrocephalus    There is development of moderate hypoattenuation left cerebellum concerning for recent infarction with fullness.  Ventricles stable without hydrocephalus.  No significant mass effect or midline shift.  There is moderate opacification sphenoid sinuses, maxillary antra and ethmoid air cells.    This report was flagged in Epic as abnormal.  Case discussed with Dr. Beckett on 06/19/2023 at 15:10                                       Medications   meclizine tablet 25 mg (25 mg Oral Given 6/19/23 1208)   sodium chloride 0.9% bolus 1,000 mL 1,000 mL (0 mLs Intravenous Stopped 6/19/23 1435)     Medical Decision Making:   Initial Assessment:   Afebrile, hemodynamically stable 68-year-old male presents with persistent room spinning dizziness upon standing  Differential Diagnosis:   Posterior stroke, CVA, ICH, orthostatic hypotension, electrolyte abnormality, TYRONE  Clinical Tests:   Lab  Tests: Ordered and Reviewed  Radiological Study: Ordered and Reviewed  Medical Tests: Ordered and Reviewed  ED Management:  See ED course           ED Course as of 06/19/23 1601   Mon Jun 19, 2023   1215 Patient has negative orthostatic vital signs. [BD]   1252 CBC auto differential(!)  CBC unremarkable without leukocytosis, significant anemia, or decreased platelets   [BD]   1255 Troponin I: <0.006  ACS unlikely [BD]   1255 Comprehensive metabolic panel(!)  CMP remarkable for TYRONE with creatinine 1.5, up from normal.  Bilirubin also up trending at 1.4 with elevated AST, unclear etiology at this time. Will give 1L NS for TYRONE [BD]   1328 BNP: 81  CHF unlikely [BD]   1525 Patient signed out to me pending vascular neurology evaluation following finding of stroke [OO]   1525 CT Head Without Contrast(!)  CT Head shows large area, concerning for recent cerebellar stroke. MRI confirms acute stroke. I have consulted and discussed the case with Vascular Neuro who will admit the patient to their service. Pt has been updated on the results and plan.  [BD]      ED Course User Index  [BD] Kd Lazo MD  [OO] Negin Medrano MD                   Clinical Impression:   Final diagnoses:  [R42] Dizziness  [I63.9] Cerebellar stroke (Primary)        ED Disposition Condition    Admit Stable                Kd Lazo MD  Resident  06/19/23 1601

## 2023-06-19 NOTE — ASSESSMENT & PLAN NOTE
68 year old gentleman admitted to Vascular Neurology service to complete stroke workup for new, acute onset vertigo symptoms and imaging notable for recent left PICA infarction in setting of rivaroxaban hold for ILR removal. NIHSS 0. Ordering PT/OT evaluations and ECHO to assess deficits/complete stroke workup. As patient is nearly a week out from stroke, vasogenic edema is less of a concern.     Antithrombotics for secondary stroke prevention: Anticoagulants: Rivaroxaban 20 mg daily    Statins for secondary stroke prevention and hyperlipidemia, if present:   Statins: Atorvastatin- 80 mg daily (on rosuvastatin 40 mg outpatient)    Aggressive risk factor modification: HTN, Diet, Exercise, A-Fib     Rehab efforts: The patient has been evaluated by a stroke team provider and the therapy needs have been fully considered based off the presenting complaints and exam findings. The following therapy evaluations are needed: PT evaluate and treat, OT evaluate and treat    Diagnostics ordered/pending: TTE to assess cardiac function/status     VTE prophylaxis: None: Reason for No Pharmacological VTE Prophylaxis: Currently on anticoagulation    BP parameters: Infarct: No intervention, SBP <180 as patient is nearly a week out from initial stroke.

## 2023-06-19 NOTE — ED NOTES
Lying- BP-113/77             HR-76             O2%-98% (RA)    Sitting- BP-110/76              HR-89              O2- 98% (RA)    Standing- BP-120/79                   HR-89                    O2- 96%(RA)

## 2023-06-19 NOTE — SUBJECTIVE & OBJECTIVE
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/04/2017    Hypertension      Past Surgical History:   Procedure Laterality Date    RADIOFREQUENCY ABLATION  04/06/2018    Cryo PVI    REMOVAL OF IMPLANTABLE LOOP RECORDER N/A 6/12/2023    Procedure: REMOVAL, IMPLANTABLE LOOP RECORDER;  Surgeon: Jose Angel Montes MD;  Location: [facility] EP [facility];  Service: Cardiology;  Laterality: N/A;  RAGINI, ILR Removal, SJM ILR, local, MB, 3 Prep     Family History   Problem Relation Age of Onset    Melanoma Neg Hx      Social History     Tobacco Use    Smoking status: Never    Smokeless tobacco: Never   Substance Use Topics    Alcohol use: Yes     Comment: socially    Drug use: No     Review of patient's allergies indicates:   Allergen Reactions    Sulfa (sulfonamide antibiotics) Hives       Medications: I have reviewed the current medication administration record.    (Not in a hospital admission)      Review of Systems   Constitutional:  Negative for chills and fever.   HENT:  Negative for hearing loss, trouble swallowing and voice change.    Eyes:  Negative for redness and visual disturbance.   Respiratory:  Negative for cough and shortness of breath.    Cardiovascular:  Negative for chest pain and leg swelling.   Gastrointestinal:  Positive for nausea. Negative for vomiting.   Musculoskeletal:  Positive for gait problem. Negative for neck pain and neck stiffness.   Skin:  Negative for color change and rash.   Neurological:  Positive for dizziness. Negative for tremors, facial asymmetry, speech difficulty, weakness, light-headedness and numbness.   Psychiatric/Behavioral:  Negative for agitation and confusion.    Objective:     Vital Signs (Most Recent):  Temp: 98.8 °F (37.1 °C) (06/19/23 1230)  Pulse: 88 (06/19/23 1230)  Resp: 18 (06/19/23 1230)  BP: 116/79 (06/19/23 1230)  SpO2: 96 % (06/19/23 1230)    Vital Signs Range (Last 24H):  Temp:  [98.8 °F (37.1  °C)-99 °F (37.2 °C)]   Pulse:  [87-88]   Resp:  [18]   BP: (116-127)/(75-79)   SpO2:  [96 %-98 %]        Physical Exam  Vitals and nursing note reviewed.   Constitutional:       General: He is not in acute distress.     Appearance: Normal appearance. He is not ill-appearing.   HENT:      Head: Normocephalic and atraumatic.      Nose: Nose normal.      Mouth/Throat:      Mouth: Mucous membranes are moist.      Pharynx: Oropharynx is clear.   Eyes:      Conjunctiva/sclera: Conjunctivae normal.      Pupils: Pupils are equal, round, and reactive to light.   Cardiovascular:      Rate and Rhythm: Normal rate. Rhythm irregular.   Pulmonary:      Effort: Pulmonary effort is normal. No respiratory distress.   Musculoskeletal:         General: No swelling or tenderness. Normal range of motion.      Cervical back: Normal range of motion.   Skin:     General: Skin is warm and dry.      Capillary Refill: Capillary refill takes less than 2 seconds.   Neurological:      General: No focal deficit present.      Mental Status: He is alert and oriented to person, place, and time.      Cranial Nerves: No cranial nerve deficit.      Sensory: No sensory deficit.      Motor: No weakness.      Coordination: Coordination normal.   Psychiatric:         Mood and Affect: Mood normal.         Behavior: Behavior normal.            Neurological Exam:   LOC: alert  Attention Span: Good   Language: No aphasia  Articulation: No dysarthria  Orientation: Person, Place, Time   Visual Fields: Full  EOM (CN III, IV, VI): Full/intact  Pupils (CN II, III): PERRL  Facial Sensation (CN V): Normal  Facial Movement (CN VII): Symmetric facial expression    Motor: 4 extremities anti-gravity  Sensation: Intact to light touch throughout      Laboratory:  CMP:   Recent Labs   Lab 06/19/23  1207   CALCIUM 9.6   ALBUMIN 3.7   PROT 7.4      K 3.7   CO2 24      BUN 18   CREATININE 1.5*   ALKPHOS 62   ALT 38   AST 47*   BILITOT 1.4*     CBC:   Recent Labs    Lab 06/19/23  1207   WBC 7.49   RBC 5.42   HGB 15.1   HCT 43.0      MCV 79*   MCH 27.9   MCHC 35.1     Lipid Panel: No results for input(s): CHOL, LDLCALC, HDL, TRIG in the last 168 hours.  Coagulation: No results for input(s): PT, INR, APTT in the last 168 hours.  Hgb A1C: No results for input(s): HGBA1C in the last 168 hours.  TSH:   Recent Labs   Lab 06/14/23  1701   TSH 0.745       Diagnostic Results:      Brain/Vessel imaging:  MRA Neck without contrast 06/19/2023    Narrative  EXAMINATION:  MRI BRAIN WITHOUT CONTRAST; MRA NECK WITHOUT CONTRAST; MRA BRAIN WITHOUT CONTRAST    CLINICAL HISTORY:  Dizziness, non-specific;; Dizziness, persistent/recurrent, cardiac or vascular cause suspected;    COMPARISON:  MRI brain and CT 08/04/2017 and CT earlier same day 06/19/2023    TECHNIQUE:  MRI brain: Sagittal and axial T1, axial T2, axial FLAIR, axial gradient and axial diffusion imaging the whole brain without contrast    MRA head: 3D time-of-flight MRA head with 3 dimensional MIP reformats    MRA neck: Noncontrast 2D time-of-flight MRA of the neck with 3 dimensional MIP reformats.    FINDINGS:  MRI brain: Moderate size region of diffusion restriction within the left cerebellum with corresponding T2 flair signal hyperintensity compatible with acute/recent infarction this corresponds to hypodensity seen on CT    There is slight localized mass effect without midline shift or hydrocephalus    Superimposed ovoid heterogeneous signal focus in the left brachium pontis compatible with cavernous malformation. No evidence for recent hemorrhage    No new parenchymal susceptibility    Ventricles stable without hydrocephalus    Major intracranial skull base T2 flow voids are present    Small encephalomalacia right cerebellum compatible with remote infarction    Moderate to severe ethmoid air cell opacification bilaterally with moderate mucosal thickening and layering air-fluid levels in the maxillary antra and sphenoid  sinuses left greater than right. Nonspecific sclerotic focus within the inferior C4 vertebral body which may represent bone island and similar but only partially included on the prior exam.    MRA head:    Anterior circulation:    Bilateral distal cervical, petrous, cavernous and supraclinoid segments of the ICAs as well as the visualized anterior and middle cerebral arteries are patent without significant focal stenosis or definite aneurysm.    Posterior circulation: The distal vertebral arteries, basilar artery and posterior cerebral arteries are patent without focal stenosis.    The left posteroinferior cerebellar artery is not seen and may be occluded.    MRA neck:    Study slightly distorted by patient motion artifacts.  Arch and origin great vessels from the arch are within normal limits.  Origin the vertebral arteries from the respective subclavian arteries are within normal limits.  Vertebral arteries are patent throughout their course without focal stenosis.    Bilateral common carotid arteries, carotid bifurcations and extracranial portions of the internal carotid arteries are patent without focal stenosis.  Less than 50% proximal ICA stenosis by NASCET criteria    This report was flagged in Epic as abnormal.    Impression  Moderate size signal abnormality left posteroinferior cerebellum most compatible with acute/recent infarction correspond to abnormality seen on CT.  No significant mass effect or hemorrhagic conversion    Stable heterogeneous signal focus left brachium pontis most compatible with cavernous malformation without evidence for recent hemorrhage    MRA head: Absent visualization left posteroinferior cerebellar artery concerning for its occlusion.    Otherwise unremarkable MRA head as detailed above    MRA neck: Unremarkable noncontrast MRA neck without evidence for significant arterial stenosis throughout the neck as detailed above.      Electronically signed by: Vinicius Burton  DO  Date:    06/19/2023  Time:    15:33      MRA Brain without contrast 06/19/2023    Narrative  EXAMINATION:  MRI BRAIN WITHOUT CONTRAST; MRA NECK WITHOUT CONTRAST; MRA BRAIN WITHOUT CONTRAST    CLINICAL HISTORY:  Dizziness, non-specific;; Dizziness, persistent/recurrent, cardiac or vascular cause suspected;    COMPARISON:  MRI brain and CT 08/04/2017 and CT earlier same day 06/19/2023    TECHNIQUE:  MRI brain: Sagittal and axial T1, axial T2, axial FLAIR, axial gradient and axial diffusion imaging the whole brain without contrast    MRA head: 3D time-of-flight MRA head with 3 dimensional MIP reformats    MRA neck: Noncontrast 2D time-of-flight MRA of the neck with 3 dimensional MIP reformats.    FINDINGS:  MRI brain: Moderate size region of diffusion restriction within the left cerebellum with corresponding T2 flair signal hyperintensity compatible with acute/recent infarction this corresponds to hypodensity seen on CT    There is slight localized mass effect without midline shift or hydrocephalus    Superimposed ovoid heterogeneous signal focus in the left brachium pontis compatible with cavernous malformation. No evidence for recent hemorrhage    No new parenchymal susceptibility    Ventricles stable without hydrocephalus    Major intracranial skull base T2 flow voids are present    Small encephalomalacia right cerebellum compatible with remote infarction    Moderate to severe ethmoid air cell opacification bilaterally with moderate mucosal thickening and layering air-fluid levels in the maxillary antra and sphenoid sinuses left greater than right. Nonspecific sclerotic focus within the inferior C4 vertebral body which may represent bone island and similar but only partially included on the prior exam.    MRA head:    Anterior circulation:    Bilateral distal cervical, petrous, cavernous and supraclinoid segments of the ICAs as well as the visualized anterior and middle cerebral arteries are patent without  significant focal stenosis or definite aneurysm.    Posterior circulation: The distal vertebral arteries, basilar artery and posterior cerebral arteries are patent without focal stenosis.    The left posteroinferior cerebellar artery is not seen and may be occluded.    MRA neck:    Study slightly distorted by patient motion artifacts.  Arch and origin great vessels from the arch are within normal limits.  Origin the vertebral arteries from the respective subclavian arteries are within normal limits.  Vertebral arteries are patent throughout their course without focal stenosis.    Bilateral common carotid arteries, carotid bifurcations and extracranial portions of the internal carotid arteries are patent without focal stenosis.  Less than 50% proximal ICA stenosis by NASCET criteria    This report was flagged in Epic as abnormal.    Impression  Moderate size signal abnormality left posteroinferior cerebellum most compatible with acute/recent infarction correspond to abnormality seen on CT.  No significant mass effect or hemorrhagic conversion    Stable heterogeneous signal focus left brachium pontis most compatible with cavernous malformation without evidence for recent hemorrhage    MRA head: Absent visualization left posteroinferior cerebellar artery concerning for its occlusion.    Otherwise unremarkable MRA head as detailed above    MRA neck: Unremarkable noncontrast MRA neck without evidence for significant arterial stenosis throughout the neck as detailed above.      Electronically signed by: Vinicius Burton DO  Date:    06/19/2023  Time:    15:33      MRI Brain Without Contrast 06/19/2023    Narrative  EXAMINATION:  MRI BRAIN WITHOUT CONTRAST; MRA NECK WITHOUT CONTRAST; MRA BRAIN WITHOUT CONTRAST    CLINICAL HISTORY:  Dizziness, non-specific;; Dizziness, persistent/recurrent, cardiac or vascular cause suspected;    COMPARISON:  MRI brain and CT 08/04/2017 and CT earlier same day 06/19/2023    TECHNIQUE:  MRI  brain: Sagittal and axial T1, axial T2, axial FLAIR, axial gradient and axial diffusion imaging the whole brain without contrast    MRA head: 3D time-of-flight MRA head with 3 dimensional MIP reformats    MRA neck: Noncontrast 2D time-of-flight MRA of the neck with 3 dimensional MIP reformats.    FINDINGS:  MRI brain: Moderate size region of diffusion restriction within the left cerebellum with corresponding T2 flair signal hyperintensity compatible with acute/recent infarction this corresponds to hypodensity seen on CT    There is slight localized mass effect without midline shift or hydrocephalus    Superimposed ovoid heterogeneous signal focus in the left brachium pontis compatible with cavernous malformation. No evidence for recent hemorrhage    No new parenchymal susceptibility    Ventricles stable without hydrocephalus    Major intracranial skull base T2 flow voids are present    Small encephalomalacia right cerebellum compatible with remote infarction    Moderate to severe ethmoid air cell opacification bilaterally with moderate mucosal thickening and layering air-fluid levels in the maxillary antra and sphenoid sinuses left greater than right. Nonspecific sclerotic focus within the inferior C4 vertebral body which may represent bone island and similar but only partially included on the prior exam.    MRA head:    Anterior circulation:    Bilateral distal cervical, petrous, cavernous and supraclinoid segments of the ICAs as well as the visualized anterior and middle cerebral arteries are patent without significant focal stenosis or definite aneurysm.    Posterior circulation: The distal vertebral arteries, basilar artery and posterior cerebral arteries are patent without focal stenosis.    The left posteroinferior cerebellar artery is not seen and may be occluded.    MRA neck:    Study slightly distorted by patient motion artifacts.  Arch and origin great vessels from the arch are within normal limits.   Origin the vertebral arteries from the respective subclavian arteries are within normal limits.  Vertebral arteries are patent throughout their course without focal stenosis.    Bilateral common carotid arteries, carotid bifurcations and extracranial portions of the internal carotid arteries are patent without focal stenosis.  Less than 50% proximal ICA stenosis by NASCET criteria    This report was flagged in Epic as abnormal.    Impression  Moderate size signal abnormality left posteroinferior cerebellum most compatible with acute/recent infarction correspond to abnormality seen on CT.  No significant mass effect or hemorrhagic conversion    Stable heterogeneous signal focus left brachium pontis most compatible with cavernous malformation without evidence for recent hemorrhage    MRA head: Absent visualization left posteroinferior cerebellar artery concerning for its occlusion.    Otherwise unremarkable MRA head as detailed above    MRA neck: Unremarkable noncontrast MRA neck without evidence for significant arterial stenosis throughout the neck as detailed above.      Electronically signed by: Vinicius Burton DO  Date:    06/19/2023  Time:    15:33      CT Head Without Contrast 06/19/2023    Narrative  EXAMINATION:  CT HEAD WITHOUT CONTRAST    CLINICAL HISTORY:  Head trauma, minor (Age >= 65y);    TECHNIQUE:  Multiple sequential 5 mm axial images of the head without contrast.  Coronal and sagittal reformatted imaging from the axial acquisition.    COMPARISON:  MRI and CT 08/04/2017    FINDINGS:  Hyperdensity within the left brachium pontis corresponds to cavernous malformation seen on prior MRI.  There is no evidence for acute intracranial hemorrhage.  The ventricles are similar without hydrocephalus    There is development of moderate hypoattenuation left cerebellum concerning for recent infarction with fullness.  Ventricles stable without hydrocephalus.  No significant mass effect or midline shift.  There is  moderate opacification sphenoid sinuses, maxillary antra and ethmoid air cells.    This report was flagged in Epic as abnormal.  Case discussed with Dr. Beckett on 06/19/2023 at 15:10    Impression  Moderate region of hypoattenuation and fullness in the left posterior cerebellum concerning for area of recent infarction.    No significant mass effect or hemorrhagic conversion    Continued small hyperdensity left brachium pontis corresponds to known cavernous malformation seen on MRI.  Allowing for this there is no definite acute intracranial hemorrhage    Clinical correlation and further evaluation with MRI if patient compatible    Moderate patchy paranasal sinus disease.      Electronically signed by: Vinicius Burton DO  Date:    06/19/2023  Time:    15:18      Cardiac Evaluation:   No results found for this or any previous visit from the past 365 days.

## 2023-06-19 NOTE — ED NOTES
RN tried calling report but RN has an admission and txf currently.  Asked if current nurse could call back in 20 minutes.

## 2023-06-19 NOTE — ED TRIAGE NOTES
Pt. Is a 68 yr old -American male presenting to the ED with dizziness.  Symptoms began two days after loop recorder was removed.  Last seen in the ED on 6/14/23. Symptoms have not subsided since then.

## 2023-06-19 NOTE — ED NOTES
RN upstairs could not take report at this time. So writer is going to bring up Pt. And do bedside report. Per War room- A-fib  HR-85

## 2023-06-19 NOTE — HPI
Vascular Neurology consulted for stroke evaluation in Reji Aleman Jr., a 68 y.o. male with history of HTN, pAF, and CVA presenting with vertigo s/p loop recorder removal with imaging notable for recent left posteroinferior cerebellum infarction. History obtained via patient and EMR.    In brief, patient initially presented 2 days post ILR removal with sudden onset dizziness, nausea, vomiting, and diarrhea; ED workup without imaging was grossly unremarkable with patient deemed to have a gastrointestinal process as the etiology for his symptoms. Patient was treated with fluids/antivert and discharged. Patient then represented with similar symptoms at which point head imaging was ordered and displayed a moderate size signal abnormality of the left posteroinferior cerebellum most compatible with acute/recent infarction. Decision made to admit patient for further workup as well as PT/OT evaluations.

## 2023-06-19 NOTE — PROGRESS NOTES
Pt to EP clinic for wound check s/p ILR removal. Incision clean, dry, intact. Dermabond in place. No s/s of infection. Instructed to call EP clinic with changes and provided with clinic number.     Pt with complaints of dizziness since having ILR removed. Was seen in the ER and discharged from with medications for nausea and diarrhea. Reports still not feeling better, very dizzy. Referred back to ER or PCP for evaluation.

## 2023-06-20 VITALS
HEIGHT: 75 IN | HEART RATE: 77 BPM | WEIGHT: 218 LBS | SYSTOLIC BLOOD PRESSURE: 114 MMHG | RESPIRATION RATE: 18 BRPM | OXYGEN SATURATION: 100 % | TEMPERATURE: 98 F | DIASTOLIC BLOOD PRESSURE: 71 MMHG | BODY MASS INDEX: 27.1 KG/M2

## 2023-06-20 PROBLEM — I10 HYPERTENSION: Status: ACTIVE | Noted: 2023-06-20

## 2023-06-20 PROBLEM — E78.2 MIXED HYPERLIPIDEMIA: Status: ACTIVE | Noted: 2023-06-20

## 2023-06-20 LAB
ALBUMIN SERPL BCP-MCNC: 3.1 G/DL (ref 3.5–5.2)
ALP SERPL-CCNC: 56 U/L (ref 55–135)
ALT SERPL W/O P-5'-P-CCNC: 32 U/L (ref 10–44)
ANION GAP SERPL CALC-SCNC: 9 MMOL/L (ref 8–16)
ASCENDING AORTA: 3.14 CM
AST SERPL-CCNC: 42 U/L (ref 10–40)
AV INDEX (PROSTH): 0.74
AV MEAN GRADIENT: 2 MMHG
AV PEAK GRADIENT: 3 MMHG
AV VALVE AREA: 2.43 CM2
AV VELOCITY RATIO: 0.75
BASOPHILS # BLD AUTO: 0.05 K/UL (ref 0–0.2)
BASOPHILS NFR BLD: 0.9 % (ref 0–1.9)
BILIRUB SERPL-MCNC: 1.3 MG/DL (ref 0.1–1)
BSA FOR ECHO PROCEDURE: 2.29 M2
BUN SERPL-MCNC: 15 MG/DL (ref 8–23)
CALCIUM SERPL-MCNC: 8.9 MG/DL (ref 8.7–10.5)
CHLORIDE SERPL-SCNC: 104 MMOL/L (ref 95–110)
CO2 SERPL-SCNC: 22 MMOL/L (ref 23–29)
CREAT SERPL-MCNC: 1.1 MG/DL (ref 0.5–1.4)
CV ECHO LV RWT: 0.54 CM
DIFFERENTIAL METHOD: ABNORMAL
DOP CALC AO PEAK VEL: 0.83 M/S
DOP CALC AO VTI: 14.34 CM
DOP CALC LVOT AREA: 3.3 CM2
DOP CALC LVOT DIAMETER: 2.05 CM
DOP CALC LVOT PEAK VEL: 0.62 M/S
DOP CALC LVOT STROKE VOLUME: 34.9 CM3
DOP CALCLVOT PEAK VEL VTI: 10.58 CM
ECHO LV POSTERIOR WALL: 1.06 CM (ref 0.6–1.1)
EJECTION FRACTION: 60 %
EOSINOPHIL # BLD AUTO: 0.2 K/UL (ref 0–0.5)
EOSINOPHIL NFR BLD: 3.9 % (ref 0–8)
ERYTHROCYTE [DISTWIDTH] IN BLOOD BY AUTOMATED COUNT: 13.8 % (ref 11.5–14.5)
EST. GFR  (NO RACE VARIABLE): >60 ML/MIN/1.73 M^2
ESTIMATED AVG GLUCOSE: 117 MG/DL (ref 68–131)
FRACTIONAL SHORTENING: 35 % (ref 28–44)
GLUCOSE SERPL-MCNC: 87 MG/DL (ref 70–110)
HBA1C MFR BLD: 5.7 % (ref 4–5.6)
HCT VFR BLD AUTO: 40.7 % (ref 40–54)
HGB BLD-MCNC: 14.2 G/DL (ref 14–18)
IMM GRANULOCYTES # BLD AUTO: 0.01 K/UL (ref 0–0.04)
IMM GRANULOCYTES NFR BLD AUTO: 0.2 % (ref 0–0.5)
INTERVENTRICULAR SEPTUM: 0.91 CM (ref 0.6–1.1)
LA MAJOR: 5.14 CM
LA MINOR: 5 CM
LA WIDTH: 4.2 CM
LEFT ATRIUM SIZE: 4.1 CM
LEFT ATRIUM VOLUME INDEX: 32.5 ML/M2
LEFT ATRIUM VOLUME: 74.2 CM3
LEFT INTERNAL DIMENSION IN SYSTOLE: 2.56 CM (ref 2.1–4)
LEFT VENTRICLE DIASTOLIC VOLUME INDEX: 29.01 ML/M2
LEFT VENTRICLE DIASTOLIC VOLUME: 66.15 ML
LEFT VENTRICLE MASS INDEX: 53 G/M2
LEFT VENTRICLE SYSTOLIC VOLUME INDEX: 10.4 ML/M2
LEFT VENTRICLE SYSTOLIC VOLUME: 23.72 ML
LEFT VENTRICULAR INTERNAL DIMENSION IN DIASTOLE: 3.91 CM (ref 3.5–6)
LEFT VENTRICULAR MASS: 120.01 G
LYMPHOCYTES # BLD AUTO: 1.3 K/UL (ref 1–4.8)
LYMPHOCYTES NFR BLD: 24.6 % (ref 18–48)
MAGNESIUM SERPL-MCNC: 2 MG/DL (ref 1.6–2.6)
MCH RBC QN AUTO: 27.6 PG (ref 27–31)
MCHC RBC AUTO-ENTMCNC: 34.9 G/DL (ref 32–36)
MCV RBC AUTO: 79 FL (ref 82–98)
MONOCYTES # BLD AUTO: 0.7 K/UL (ref 0.3–1)
MONOCYTES NFR BLD: 12.2 % (ref 4–15)
NEUTROPHILS # BLD AUTO: 3.1 K/UL (ref 1.8–7.7)
NEUTROPHILS NFR BLD: 58.2 % (ref 38–73)
NRBC BLD-RTO: 0 /100 WBC
PHOSPHATE SERPL-MCNC: 3.4 MG/DL (ref 2.7–4.5)
PISA TR MAX VEL: 2.56 M/S
PLATELET # BLD AUTO: 193 K/UL (ref 150–450)
PMV BLD AUTO: 11.4 FL (ref 9.2–12.9)
POTASSIUM SERPL-SCNC: 3.9 MMOL/L (ref 3.5–5.1)
PROT SERPL-MCNC: 6.5 G/DL (ref 6–8.4)
RA MAJOR: 5.69 CM
RA PRESSURE: 3 MMHG
RA WIDTH: 3.54 CM
RBC # BLD AUTO: 5.14 M/UL (ref 4.6–6.2)
RIGHT VENTRICULAR END-DIASTOLIC DIMENSION: 3.48 CM
SINUS: 3.38 CM
SODIUM SERPL-SCNC: 135 MMOL/L (ref 136–145)
STJ: 3.06 CM
TDI LATERAL: 0.12 M/S
TDI SEPTAL: 0.07 M/S
TDI: 0.1 M/S
TR MAX PG: 26 MMHG
TRICUSPID ANNULAR PLANE SYSTOLIC EXCURSION: 1.69 CM
TV REST PULMONARY ARTERY PRESSURE: 29 MMHG
WBC # BLD AUTO: 5.33 K/UL (ref 3.9–12.7)

## 2023-06-20 PROCEDURE — 83735 ASSAY OF MAGNESIUM: CPT | Performed by: STUDENT IN AN ORGANIZED HEALTH CARE EDUCATION/TRAINING PROGRAM

## 2023-06-20 PROCEDURE — 97116 GAIT TRAINING THERAPY: CPT

## 2023-06-20 PROCEDURE — 84100 ASSAY OF PHOSPHORUS: CPT | Performed by: STUDENT IN AN ORGANIZED HEALTH CARE EDUCATION/TRAINING PROGRAM

## 2023-06-20 PROCEDURE — 25000003 PHARM REV CODE 250: Performed by: STUDENT IN AN ORGANIZED HEALTH CARE EDUCATION/TRAINING PROGRAM

## 2023-06-20 PROCEDURE — 97165 OT EVAL LOW COMPLEX 30 MIN: CPT

## 2023-06-20 PROCEDURE — 85025 COMPLETE CBC W/AUTO DIFF WBC: CPT | Performed by: STUDENT IN AN ORGANIZED HEALTH CARE EDUCATION/TRAINING PROGRAM

## 2023-06-20 PROCEDURE — 36415 COLL VENOUS BLD VENIPUNCTURE: CPT | Performed by: NURSE PRACTITIONER

## 2023-06-20 PROCEDURE — 94761 N-INVAS EAR/PLS OXIMETRY MLT: CPT

## 2023-06-20 PROCEDURE — 97161 PT EVAL LOW COMPLEX 20 MIN: CPT

## 2023-06-20 PROCEDURE — 83701 LIPOPROTEIN BLD HR FRACTION: CPT | Performed by: NURSE PRACTITIONER

## 2023-06-20 PROCEDURE — 80053 COMPREHEN METABOLIC PANEL: CPT | Performed by: STUDENT IN AN ORGANIZED HEALTH CARE EDUCATION/TRAINING PROGRAM

## 2023-06-20 PROCEDURE — 36415 COLL VENOUS BLD VENIPUNCTURE: CPT | Performed by: STUDENT IN AN ORGANIZED HEALTH CARE EDUCATION/TRAINING PROGRAM

## 2023-06-20 PROCEDURE — 97530 THERAPEUTIC ACTIVITIES: CPT

## 2023-06-20 PROCEDURE — 99239 PR HOSPITAL DISCHARGE DAY,>30 MIN: ICD-10-PCS | Mod: ,,, | Performed by: PSYCHIATRY & NEUROLOGY

## 2023-06-20 PROCEDURE — 83036 HEMOGLOBIN GLYCOSYLATED A1C: CPT | Performed by: NURSE PRACTITIONER

## 2023-06-20 PROCEDURE — 99239 HOSP IP/OBS DSCHRG MGMT >30: CPT | Mod: ,,, | Performed by: PSYCHIATRY & NEUROLOGY

## 2023-06-20 RX ADMIN — ATORVASTATIN CALCIUM 80 MG: 40 TABLET, FILM COATED ORAL at 09:06

## 2023-06-20 RX ADMIN — ATENOLOL 50 MG: 50 TABLET ORAL at 09:06

## 2023-06-20 RX ADMIN — RIVAROXABAN 20 MG: 20 TABLET, FILM COATED ORAL at 04:06

## 2023-06-20 RX ADMIN — AMLODIPINE BESYLATE 5 MG: 5 TABLET ORAL at 09:06

## 2023-06-20 NOTE — DISCHARGE SUMMARY
Jose Carlos Mejia - Neurosurgery ([facility])  Vascular Neurology  Comprehensive Stroke Center  Discharge Summary     Summary:     Admit Date: 6/19/2023 10:59 AM    Discharge Date and Time:  06/20/2023 3:22 PM    Attending Physician: Janet Ventura MD     Discharge Provider: Brianna Metzger NP    History of Present Illness: Vascular Neurology consulted for stroke evaluation in Reji Aleman Jr., a 68 y.o. male with history of HTN, pAF, and CVA presenting with vertigo s/p loop recorder removal with imaging notable for recent left posteroinferior cerebellum infarction. History obtained via patient and EMR.    In brief, patient initially presented 2 days post ILR removal with sudden onset dizziness, nausea, vomiting, and diarrhea; ED workup without imaging was grossly unremarkable with patient deemed to have a gastrointestinal process as the etiology for his symptoms. Patient was treated with fluids/antivert and discharged. Patient then represented with similar symptoms at which point head imaging was ordered and displayed a moderate size signal abnormality of the left posteroinferior cerebellum most compatible with acute/recent infarction. Decision made to admit patient for further workup as well as PT/OT evaluations.         Hospital Course (synopsis of major diagnoses, care, treatment, and services provided during the course of the hospital stay): 68 year old male wih PMH of HTN, Afib on xarelto, CVA who presented to the ED with C/o dizziness for 1 week. He had a loop recorder removed June 12 and xarelto was ordered to be hold 4 days before and after. June 14 he had severe dizziness and fell on the floor couldn't get up, went to the ED but no brain imaging was obtained and he was discharged home. He presented again on June 19 with persistent dizziness and imaging revealed a Left posteroinferior cerebellum acute/subacute infarct, left brachium pontis cavernous malformation. Vessel imaging showed L PICA occlusion. No TNK or  thrombectomy as he was OOW. Echo confirmed afib. Etiology cardioembolic.     Therapy recommend home with no needs. Discharging home. Dizziness improved. Follow up with Vascular Neurology in 1 month, PCP in 1 week, and cardiology as needed      Goals of Care Treatment Preferences:  Code Status: Full Code      Stroke Etiology: Ischemic Cardioembolic Atrial fibrillation    STROKE DOCUMENTATION   Acute Stroke Times   Last Known Normal Date: 06/13/23  Unknown Normal Time: Unknown Time  Symptom Onset Date: 06/13/23  Unknown Symptom Onset Time: Unknown Time  Thrombolytic Therapy Recommended: No  Thrombectomy Recommended: No     NIH Scale:  1a. Level of Consciousness: 0-->Alert, keenly responsive  1b. LOC Questions: 0-->Answers both questions correctly  1c. LOC Commands: 0-->Performs both tasks correctly  2. Best Gaze: 0-->Normal  3. Visual: 0-->No visual loss  4. Facial Palsy: 0-->Normal symmetrical movements  5a. Motor Arm, Left: 0-->No drift, limb holds 90 (or 45) degrees for full 10 secs  5b. Motor Arm, Right: 0-->No drift, limb holds 90 (or 45) degrees for full 10 secs  6a. Motor Leg, Left: 0-->No drift, leg holds 30 degree position for full 5 secs  6b. Motor Leg, Right: 0-->No drift, leg holds 30 degree position for full 5 secs  7. Limb Ataxia: 0-->Absent  8. Sensory: 0-->Normal, no sensory loss  9. Best Language: 0-->No aphasia, normal  10. Dysarthria: 0-->Normal  11. Extinction and Inattention (formerly Neglect): 0-->No abnormality  Total (NIH Stroke Scale): 0        Modified Yeimy Score: 1  Ripley Coma Scale:    ABCD2 Score:    [id number] Score:5  HAS -BLED Score:   ICH Score:   Hunt & Stallworth Classification:       Assessment/Plan:     Diagnostic Results:      Brain Imaging:   CTH 6/20/2023     Moderate region of hypoattenuation and fullness in the left posterior cerebellum concerning for area of recent infarction.     No significant mass effect or hemorrhagic conversion     Continued small hyperdensity left  brachium pontis corresponds to known cavernous malformation seen on MRI.  Allowing for this there is no definite acute intracranial hemorrhage     MRI Brain 6/19/2023  Moderate size signal abnormality left posteroinferior cerebellum most compatible with acute/recent infarction correspond to abnormality seen on CT.  No significant mass effect or hemorrhagic conversion     Stable heterogeneous signal focus left brachium pontis most compatible with cavernous malformation without evidence for recent hemorrhage     MRA brain/neck 6/19/2023  MRA head: Absent visualization left posteroinferior cerebellar artery concerning for its occlusion.     Otherwise unremarkable MRA head as detailed above     MRA neck: Unremarkable noncontrast MRA neck without evidence for significant arterial stenosis throughout the neck as detailed above.     Cardiac Imaging   Echo 6/19/2023   The left ventricle is normal in size with concentric remodeling and normal systolic function.   The estimated ejection fraction is 60%.   Normal right ventricular size with normal right ventricular systolic function.   Mild mitral regurgitation.   Mild tricuspid regurgitation.   The estimated PA systolic pressure is 29 mmHg.   Normal central venous pressure (3 mmHg).   Trivial circumferential pericardial effusion.   Atrial fibrillation observed.       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  Service: Vascular Neurology      Recommendations:  "    Post-discharge complication risks: None    Stroke Education given to: patient    Follow-up in Stroke Clinic in 4-6 weeks.     Discharge Plan:  Statin: Rosuvastatin 40mg  Anticoagulant: Rivaroxaban  Aggresive risk factor modification:  Hypertension  High Cholesterol  Atrial Fibrillation    Follow Up:   Follow-up Information     [facility] Follow up in 1 week(s).    Contact information:  [address]  [facility] [address]  [phone]             [facility] VASCULAR NEUROLOGY Follow up in 1 month(s).    Specialty: Vascular Neurology  Contact information:  [address] Mejia  [facility] [address]  [phone]                       Patient Instructions:      BATH/SHOWER CHAIR FOR HOME USE     Order Specific Question Answer Comments   Height: 6' 3" (1.905 m)    Weight: 98.9 kg (218 lb)    Does patient have medical equipment at home? none    Length of need (1-99 months): 30    Type: With back      Ambulatory referral/consult to Vascular Neurology   Standing Status: Future   Referral Priority: Routine Referral Type: Consultation   Referral Reason: Specialty Services Required   Requested Specialty: Vascular Neurology   Number of Visits Requested: 1     Diet Cardiac   Order Comments: See Stroke Patient Education Guide Booklet for details.     Call 911 for any of the following:   Order Comments: Call 911  right away if any of the following warning signs come on suddenly, even if the symptoms only last for a few minutes. With stroke, timing is very important.   - Warning Signs of Stroke:  - Weakness: You may feel a sudden weakness, tingling or loss of feeling on one side of your face or body.  - Vision Problems: You may have sudden double vision or trouble seeing in one or both eyes.  - Speech Problems: You may have sudden trouble talking, slured speech, or problems understanding others.  - Headache: You may have sudden, severe headache.  - Movement Problems: You may experience " dizziness, a feeling of spinning, a loss of balance, a feeling of falling or blackouts.       Medications:  Reconciled Home Medications:      Medication List      CHANGE how you take these medications    triamcinolone acetonide 0.1% 0.1 % ointment  Commonly known as: KENALOG  APPLY TO AFFECTED AREA TWICE A DAY FOR 1-2 WKS THEN AS NEEDED FLARES ONLY  What changed: Another medication with the same name was removed. Continue taking this medication, and follow the directions you see here.        CONTINUE taking these medications    amLODIPine 10 MG tablet  Commonly known as: NORVASC  TAKE ONE-HALF (1/2) TABLET DAILY     atenoloL 50 MG tablet  Commonly known as: TENORMIN  Take 1 tablet (50 mg total) by mouth once daily.     hydrOXYzine HCL 25 MG tablet  Commonly known as: ATARAX  TAKE 1 TABLET NIGHTLY AS NEEDED FOR ITCHING     ondansetron 4 MG Tbdl  Commonly known as: ZOFRAN-ODT  Take 1 tablet (4 mg total) by mouth every 6 (six) hours as needed (nausea).     rosuvastatin 40 MG Tab  Commonly known as: CRESTOR  TAKE 1 TABLET EVERY EVENING     XARELTO 20 mg Tab  Generic drug: rivaroxaban  TAKE 1 TABLET DAILY WITH DINNER OR EVENING MEAL        STOP taking these medications    aspirin 81 MG Chew     diphenoxylate-atropine 2.5-0.025 mg 2.5-0.025 mg per tablet  Commonly known as: LOMOTIL     fluocinonide 0.05% 0.05 % cream  Commonly known as: LIDEX     UNABLE TO FIND     UNABLE TO FIND            Brianna Metzger NP  [facility]  Department of Vascular Neurology   [facility] Neurosurgery [facility])

## 2023-06-20 NOTE — ASSESSMENT & PLAN NOTE
68 year old male wih PMH of HTN, Afib on xarelto, CVA who presented to the ED with C/o dizziness for 1 week. He had a loop recorder removed June 12 and xarelto was ordered to be hold 4 days before and after. June 14 he had severe dizziness and fell on the floor couldn't get up, went to the ED but no brain imaging was obtained and he was discharged home. He presented again on June 19 with persistent dizziness and imaging revealed a Left posteroinferior cerebellum acute/subacute infarct, left brachium pontis cavernous malformation. Vessel imaging showed L PICA occlusion. No TNK or thrombectomy as he was OOW. Echo confirmed afib. Etiology cardioembolic.    Therapy recommend home with no needs. Discharging home. Dizziness improved. Follow up with Vascular Neurology in 1 month, PCP in 1 week, and cardiology as needed.    Antithrombotics for secondary stroke prevention: Anticoagulants: Rivaroxaban 20 mg daily    Statins for secondary stroke prevention and hyperlipidemia, if present:   Statins: Atorvastatin- 80 mg daily (on rosuvastatin 40 mg outpatient)    Aggressive risk factor modification: HTN, Diet, Exercise, A-Fib     Rehab efforts: The patient has been evaluated by a stroke team provider and the therapy needs have been fully considered based off the presenting complaints and exam findings. The following therapy evaluations are needed: PT evaluate and treat, OT evaluate and treat    Diagnostics ordered/pending: TTE to assess cardiac function/status     VTE prophylaxis: None: Reason for No Pharmacological VTE Prophylaxis: Currently on anticoagulation    BP parameters: Infarct: No intervention, SBP <180 as patient is nearly a week out from initial stroke.

## 2023-06-20 NOTE — PROGRESS NOTES
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 He had a loop recorder removed June 12 and xarelto was ordered to be hold 4 days before and after. June 14 he had severe dizziness and fell on the floor couldn't get up, went to the ED but no brain imaging was obtained and he was discharged home. He presented again on June 19 with persistent dizziness and imaging revealed a Left posteroinferior cerebellum acute/subacute infarct, left brachium pontis cavernous malformation. Vessel imaging showed L PICA occlusion. No TNK or thrombectomy as he was OOW. Echo confirmed afib. Etiology cardioembolic.    Therapy recommend home with no needs. Discharging home. Dizziness improved. Follow up with Vascular Neurology in 1 month, PCP in 1 week, and cardiology as needed.    Antithrombotics for secondary stroke prevention: Anticoagulants: Rivaroxaban 20 mg daily    Statins for secondary stroke prevention and hyperlipidemia, if present:   Statins: Atorvastatin- 80 mg daily (on rosuvastatin 40 mg outpatient)    Aggressive risk factor modification: HTN, Diet, Exercise, A-Fib     Rehab efforts: The patient has been evaluated by a stroke team provider and the therapy needs have been fully considered based off the presenting complaints and exam findings. The following therapy evaluations are needed: PT evaluate and treat, OT evaluate and treat    Diagnostics ordered/pending: TTE to assess cardiac function/status     VTE prophylaxis: None: Reason for No Pharmacological VTE Prophylaxis: Currently on anticoagulation    BP parameters: Infarct: No intervention, SBP <180 as patient is nearly a week out from initial stroke.       Mixed hyperlipidemia  Stroke risk factor  LDL pending  Home  statin increased     Hypertension  Stroke risk factor  Goal is normotension  On home atenolol and amlodipine    Paroxysmal atrial fibrillation  Stroke risk factor  - continue rivaroxaban         6/20-Neuro exam stable. Discharging home with no needs.      STROKE DOCUMENTATION   Acute Stroke Times   Last Known Normal Date: 06/13/23  Unknown Normal Time: Unknown Time  Symptom Onset Date: 06/13/23  Unknown Symptom Onset Time: Unknown Time  Thrombolytic Therapy Recommended: No  Thrombectomy Recommended: No    NIH Scale:  1a. Level of Consciousness: 0-->Alert, keenly responsive  1b. LOC Questions: 0-->Answers both questions correctly  1c. LOC Commands: 0-->Performs both tasks correctly  2. Best Gaze: 0-->Normal  3. Visual: 0-->No visual loss  4. Facial Palsy: 0-->Normal symmetrical movements  5a. Motor Arm, Left: 0-->No drift, limb holds 90 (or 45) degrees for full 10 secs  5b. Motor Arm, Right: 0-->No drift, limb holds 90 (or 45) degrees for full 10 secs  6a. Motor Leg, Left: 0-->No drift, leg holds 30 degree position for full 5 secs  6b. Motor Leg, Right: 0-->No drift, leg holds 30 degree position for full 5 secs  7. Limb Ataxia: 0-->Absent  8. Sensory: 0-->Normal, no sensory loss  9. Best Language: 0-->No aphasia, normal  10. Dysarthria: 0-->Normal  11. Extinction and Inattention (formerly Neglect): 0-->No abnormality  Total (NIH Stroke Scale): 0       Modified Yeimy Score: 1  Jorge Coma Scale:    ABCD2 Score:    [id number] Score:5  HAS -BLED Score:   ICH Score:   Hunt & Stallworth Classification:      Hemorrhagic change of an Ischemic Stroke: Does this patient have an ischemic stroke with hemorrhagic changes? No     Neurologic Chief Complaint: dizzy    Subjective:     Interval History: Patient is seen for follow-up neurological assessment and treatment recommendations: Neuro exam stable. Discharging home with no needs.    HPI, Past Medical, Family, and Social History remains the same as documented in the initial  encounter.     Review of Systems   Constitutional:  Negative for activity change and fatigue.   HENT:  Negative for drooling and trouble swallowing.    Eyes:  Negative for photophobia and visual disturbance.   Respiratory:  Negative for cough and shortness of breath.    Cardiovascular:  Negative for chest pain and palpitations.   Gastrointestinal:  Negative for nausea and vomiting.   Musculoskeletal:  Positive for gait problem. Negative for neck pain and neck stiffness.   Skin:  Negative for rash and wound.   Neurological:  Positive for dizziness. Negative for facial asymmetry, speech difficulty, weakness and headaches.   Psychiatric/Behavioral:  Negative for confusion. The patient is not nervous/anxious.    Scheduled Meds:   amLODIPine  5 mg Oral Daily    atenoloL  50 mg Oral Daily    atorvastatin  80 mg Oral Daily    rivaroxaban  20 mg Oral with dinner     Continuous Infusions:  PRN Meds:labetalol, sodium chloride 0.9%    Objective:     Vital Signs (Most Recent):  Temp: 98.4 °F (36.9 °C) (06/20/23 0728)  Pulse: 90 (06/20/23 1023)  Resp: 18 (06/20/23 1023)  BP: 114/71 (06/20/23 0728)  SpO2: 100 % (06/20/23 1023)  BP Location: Right arm    Vital Signs Range (Last 24H):  Temp:  [97.8 °F (36.6 °C)-98.4 °F (36.9 °C)]   Pulse:  []   Resp:  [16-18]   BP: ()/(66-91)   SpO2:  [95 %-100 %]   BP Location: Right arm       Physical Exam  Constitutional:       General: He is not in acute distress.  HENT:      Head: Normocephalic.   Eyes:      Extraocular Movements: Extraocular movements intact.   Cardiovascular:      Rate and Rhythm: Normal rate.   Pulmonary:      Effort: Pulmonary effort is normal.   Abdominal:      General: Abdomen is flat.   Musculoskeletal:         General: Normal range of motion.   Skin:     General: Skin is warm and dry.   Neurological:      Mental Status: He is alert and oriented to person, place, and time.      Cranial Nerves: No dysarthria or facial asymmetry.      Motor: No weakness.       Coordination: Finger-Nose-Finger Test normal.   Psychiatric:         Mood and Affect: Mood normal.            Neurological Exam:   LOC: alert  Attention Span: Good   Language: No aphasia  Articulation: No dysarthria  Orientation: Person, Place, Time   Visual Fields: Full  EOM (CN III, IV, VI): Full/intact  Pupils (CN II, III): PERRL  Facial Sensation (CN V): Normal  Facial Movement (CN VII): Symmetric facial expression    Motor: 4 extremities anti-gravity  Sensation: Intact to light touch throughout    Laboratory:  CMP:   Recent Labs   Lab 06/20/23  0602   CALCIUM 8.9   ALBUMIN 3.1*   PROT 6.5   *   K 3.9   CO2 22*      BUN 15   CREATININE 1.1   ALKPHOS 56   ALT 32   AST 42*   BILITOT 1.3*     BMP:   Recent Labs   Lab 06/20/23  0602   *   K 3.9      CO2 22*   BUN 15   CREATININE 1.1   CALCIUM 8.9     CBC:   Recent Labs   Lab 06/20/23  0602   WBC 5.33   RBC 5.14   HGB 14.2   HCT 40.7      MCV 79*   MCH 27.6   MCHC 34.9     Lipid Panel: No results for input(s): CHOL, LDLCALC, HDL, TRIG in the last 168 hours.  Coagulation: No results for input(s): PT, INR, APTT in the last 168 hours.  Platelet Aggregation Study: No results for input(s): PLTAGG, PLTAGINTERP, PLTAGREGLACO, ADPPLTAGGREG in the last 168 hours.  Hgb A1C:   Recent Labs   Lab 06/20/23  0819   HGBA1C 5.7*     TSH:   Recent Labs   Lab 06/14/23  1701   TSH 0.745       Diagnostic Results     Brain Imaging   CTH 6/20/2023     Moderate region of hypoattenuation and fullness in the left posterior cerebellum concerning for area of recent infarction.     No significant mass effect or hemorrhagic conversion     Continued small hyperdensity left brachium pontis corresponds to known cavernous malformation seen on MRI.  Allowing for this there is no definite acute intracranial hemorrhage    MRI Brain 6/19/2023  Moderate size signal abnormality left posteroinferior cerebellum most compatible with acute/recent infarction correspond to  abnormality seen on CT.  No significant mass effect or hemorrhagic conversion     Stable heterogeneous signal focus left brachium pontis most compatible with cavernous malformation without evidence for recent hemorrhage    MRA brain/neck 6/19/2023  MRA head: Absent visualization left posteroinferior cerebellar artery concerning for its occlusion.     Otherwise unremarkable MRA head as detailed above     MRA neck: Unremarkable noncontrast MRA neck without evidence for significant arterial stenosis throughout the neck as detailed above.    Cardiac Imaging   Echo 6/19/2023   The left ventricle is normal in size with concentric remodeling and normal systolic function.   The estimated ejection fraction is 60%.   Normal right ventricular size with normal right ventricular systolic function.   Mild mitral regurgitation.   Mild tricuspid regurgitation.   The estimated PA systolic pressure is 29 mmHg.   Normal central venous pressure (3 mmHg).   Trivial circumferential pericardial effusion.   Atrial fibrillation observed.         Brianna Metzger NP  Comprehensive Stroke Center  Department of Vascular Neurology   [facility] Neurosurgery [facility])

## 2023-06-20 NOTE — PT/OT/SLP EVAL
"Occupational Therapy   Evaluation    Name: Reji Aleman Jr.  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 is a 68 y.o. 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  He presents with performance deficits affecting function: impaired self care skills, impaired functional mobility, impaired endurance, impaired balance.      Rehab Prognosis: Good; patient would benefit from acute skilled OT services to address these deficits and reach maximum level of function.       Plan:     Patient to be seen 3 x/week to address the above listed problems via self-care/home management, therapeutic activities, neuromuscular re-education  Plan of Care Expires: 07/18/23  Plan of Care Reviewed with: patient    Subjective     Patient: "I couldn't do that a few days ago."     Occupational Profile:  Patient resides in [place] with 3 grandkids ages 12, 10 and 3 in one story home with 2 steps to enter, rail on right.  PTA patient independent with ADLs including driving.  Patient is left handed.  Retired:  at bank.  Hobbies: sports.    Pain/Comfort:  Pain Rating 1: 0/10  Pain Rating Post-Intervention 1: 0/10    Patients cultural, spiritual, Moravian conflicts given the current situation: no    Objective:     Communicated with: Nurse prior to session.  Patient found supine with telemetry, peripheral IV upon OT entry to room.    General Precautions: Standard, fall  Orthopedic Precautions: N/A  Braces: N/A  Respiratory Status: Room air    Occupational Performance:    Bed Mobility:    Patient completed Rolling/Turning to Left with  supervision  Patient completed Rolling/Turning to Right with " supervision  Patient completed Supine to Sit with supervision  Patient completed Sit to Supine with supervision    Functional Mobility/Transfers:  Patient completed Sit <> Stand Transfer with supervision  with  no assistive device   Patient completed Bed <> Chair Transfer using Stand Pivot technique with supervision with no assistive device    Activities of Daily Living:  Grooming: supervision while standing  Upper Body Dressing: supervision    Lower Body Dressing: supervision      Cognitive/Visual Perceptual:  Cognitive/Psychosocial Skills:     -       Oriented to: Person, Place, Time, and Situation   -       Follows Commands/attention:Follows one-step commands  -       Communication: clear/fluent    Physical Exam:  Postural examination/scapula alignment:    -       Rounded shoulders  Upper Extremity Range of Motion:     -       Right Upper Extremity: WNL  -       Left Upper Extremity: WNL  Upper Extremity Strength:    -       Right Upper Extremity: WNL  -       Left Upper Extremity: WNL    AMPAC 6 Click ADL:  AMPAC Total Score: 18    Treatment & Education:  Patient alert and oriented x 3; able to follow 4/4 one step commands.  Patient attentive and interactive throughout the session.      Patient left supine with all lines intact, call button in reach, and bed alarm on    GOALS:   Multidisciplinary Problems       Occupational Therapy Goals          Problem: Occupational Therapy    Goal Priority Disciplines Outcome Interventions   Occupational Therapy Goal     OT, PT/OT Ongoing, Progressing    Description: Goals set 6/20 to be addressed for 14 days with expiration date, 7/4:  Patient will increase functional independence with ADLs by performing:    Patient will demonstrate rolling to the right with modified independence.  Not met   Patient will demonstrate rolling to the left with modified independence assist.   Not met  Patient will demonstrate supine -sit with modified independence.   Not met  Patient will  demonstrate stand pivot transfers with modified independence.   Not met  Patient will demonstrate grooming while standing with modified independence.   Not met  Patient will demonstrate upper body dressing with modified independence while seated EOB.   Not met  Patient will demonstrate lower body dressing with modified independence while seated EOB.   Not met  Patient will demonstrate toileting with modified independence.   Not met                         History:     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/04/2017    Hypertension          Past Surgical History:   Procedure Laterality Date    RADIOFREQUENCY ABLATION  04/06/2018    Cryo PVI    REMOVAL OF IMPLANTABLE LOOP RECORDER N/A 6/12/2023    Procedure: REMOVAL, IMPLANTABLE LOOP RECORDER;  Surgeon: Jose Angel Montes MD;  Location: [facility] EP LAB;  Service: Cardiology;  Laterality: N/A;  RAGINI, ILR Removal, SJM ILR, DANIAL devi, 3 Prep       Time Tracking:     OT Date of Treatment: 06/20/23  OT Start Time: 0515  OT Stop Time: 0535  OT Total Time (min): 20 min    Billable Minutes:Evaluation 10  Therapeutic Activity 10    6/20/2023

## 2023-06-20 NOTE — HOSPITAL COURSE
68 year old male wih PMH of HTN, Afib on xarelto, CVA who presented to the ED with C/o dizziness for 1 week. He had a loop recorder removed June 12 and xarelto was ordered to be hold 4 days before and after. June 14 he had severe dizziness and fell on the floor couldn't get up, went to the ED but no brain imaging was obtained and he was discharged home. He presented again on June 19 with persistent dizziness and imaging revealed a Left posteroinferior cerebellum acute/subacute infarct, left brachium pontis cavernous malformation. Vessel imaging showed L PICA occlusion. No TNK or thrombectomy as he was OOW. Echo confirmed afib. Etiology cardioembolic.     Therapy recommend home with no needs. Discharging home. Dizziness improved. Follow up with Vascular Neurology in 1 month, PCP in 1 week, and cardiology as needed

## 2023-06-20 NOTE — PHYSICIAN QUERY
PT Name: Reji Aleman Jr.  MR #: [id number]    DOCUMENTATION CLARIFICATION     CDS/: Ann-Marie Thomason RN, CDS              Contact information: [email or web address]   This form is a permanent document in the medical record.     Query Date: June 20, 2023    By submitting this query, we are merely seeking further clarification of documentation.  Please utilize your independent clinical judgment when addressing the question(s) below.      The Medical Record reflects the following:  Clinical Information Location in Medical Record     --CMP remarkable for TYRONE with creatinine 1.5, up from normal.     -Will give 1L NS for TYRONE      06/19/23 12:07 06/20/23 06:02   BUN 18 15   Creatinine 1.5 (H) 1.1   eGFR 49.8 ! >60.0      ED Provider Note     Please clarify/confirm the Emergency Medicine diagnosis of __AKI__?     [   x] Diagnosis ruled in as Acute Renal Failure/Injury     [   ] Diagnosis ruled in as Acute Renal Insufficiency     [   ] Diagnosis ruled in as Acute Renal Failure/Injury, but it resolved prior to my assessment of the patient     [   ] Diagnosis ruled in as Acute Renal Insufficiency, but it resolved prior to my assessment of the patient     [   ] Diagnosis ruled out   [   ] Other diagnosis (please specify): _____________   [  ] Clinically undetermined

## 2023-06-20 NOTE — PT/OT/SLP EVAL
"Physical Therapy Evaluation/D/C Summary    Patient Name:  Reji Aleman Jr.   MRN:  [id number]    Recommendations:     Discharge Recommendations: home   Discharge Equipment Recommendations: none   Barriers to discharge: Decreased caregiver support lives with 3 dependent children    Assessment:     Reji Aleman Jr. is a 68 y.o. 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  PT D/Cerd due to pt able to perform functional mobility including up/down steps.    Recent Surgery: * No surgery found *      Plan:       (D/C PT due to pt able to perform functional mobility including up/down steps.)   Plan of Care Expires:  07/20/23    Subjective   "I am doing better, I just get dizzy sometimes when I stand up"  Pain/Comfort:  Pain Rating 1: 0/10  Pain Rating Post-Intervention 1: 0/10    Patients cultural, spiritual, Latter day conflicts given the current situation: no    Living Environment:  Pt lives in a [address] [place] [address] with 2 VERONICA with R UE rails with his 3 grandchildren (ages 12,10, and 3)  Prior to admission, patients level of function was independent and driving.  Equipment used at home: none. Upon discharge, patient will have assistance from unknown.    Objective:     Communicated with nurse prior to session.  Patient found HOB elevated with telemetry  upon PT entry to room.    General Precautions: Standard, fall  Orthopedic Precautions:N/A   Braces: N/A  Respiratory Status: Room air    Exams:  Cognitive Exam:  Patient is oriented to Person, Place, and Time  Sensation:    -       Intact  light/touch B LE  RLE ROM: WFL  RLE Strength: WFL  LLE ROM: WFL  LLE Strength: WFL    Functional Mobility:  Bed Mobility:     Supine to Sit: independence  Transfers:     Sit to Stand:  independence with no AD  Gait: 100ft then 200ft with no AD with mod independent. Pt with mild instability and dizziness with quick change of direction. Pt educated to slow pace with change of " direction with improved stability and no c/o dizziness. Pt performed standing balance activities: high knee gait and ambulated backwards with no instability noted  Stairs:  Pt ascended/descended 4 stair(s) with No Assistive Device with right handrail with Modified Independent.     AM-PAC 6 CLICK MOBILITY  Total Score:24     Treatment & Education:  Pt educated in using visual compensation when coming to stand by focusing on an object in front of him, he expressed understand.    Patient left sitting edge of bed with all lines intact, call button in reach, and nurse notified.    GOALS:   Multidisciplinary Problems       Physical Therapy Goals       Not on file              Multidisciplinary Problems (Resolved)          Problem: Physical Therapy    Goal Priority Disciplines Outcome Goal Variances Interventions   Physical Therapy Goal   (Resolved)     PT, PT/OT Met                         History:     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/04/2017    Hypertension        Past Surgical History:   Procedure Laterality Date    RADIOFREQUENCY ABLATION  04/06/2018    Cryo PVI    REMOVAL OF IMPLANTABLE LOOP RECORDER N/A 6/12/2023    Procedure: REMOVAL, IMPLANTABLE LOOP RECORDER;  Surgeon: Jose Angel Montes MD;  Location: [facility] EP LAB;  Service: Cardiology;  Laterality: N/A;  RAGINI, ILR Removal, SJM ILR, marito, MB, 3 Prep       Time Tracking:     PT Received On: 06/20/23  PT Start Time: 0832     PT Stop Time: 0855  PT Total Time (min): 23 min     Billable Minutes: Evaluation 10 and Gait Training 13      06/20/2023

## 2023-06-20 NOTE — PLAN OF CARE
Goals remain appropriate.  Problem: Occupational Therapy  Goal: Occupational Therapy Goal  Description: Goals set 6/20 to be addressed for 14 days with expiration date, 7/4:  Patient will increase functional independence with ADLs by performing:    Patient will demonstrate rolling to the right with modified independence.  Not met   Patient will demonstrate rolling to the left with modified independence assist.   Not met  Patient will demonstrate supine -sit with modified independence.   Not met  Patient will demonstrate stand pivot transfers with modified independence.   Not met  Patient will demonstrate grooming while standing with modified independence.   Not met  Patient will demonstrate upper body dressing with modified independence while seated EOB.   Not met  Patient will demonstrate lower body dressing with modified independence while seated EOB.   Not met  Patient will demonstrate toileting with modified independence.   Not met    Outcome: Ongoing, Progressing

## 2023-06-20 NOTE — PLAN OF CARE
Problem: Physical Therapy  Goal: Physical Therapy Goal  Outcome: Met   PT D/Jacinto due to pt able to perform functional mobility including up/down steps. Laurie Navarro PT 6/20/23

## 2023-06-20 NOTE — SUBJECTIVE & OBJECTIVE
Neurologic Chief Complaint: dizzy    Subjective:     Interval History: Patient is seen for follow-up neurological assessment and treatment recommendations: Neuro exam stable. Discharging home with no needs.    HPI, Past Medical, Family, and Social History remains the same as documented in the initial encounter.     Review of Systems   Constitutional:  Negative for activity change and fatigue.   HENT:  Negative for drooling and trouble swallowing.    Eyes:  Negative for photophobia and visual disturbance.   Respiratory:  Negative for cough and shortness of breath.    Cardiovascular:  Negative for chest pain and palpitations.   Gastrointestinal:  Negative for nausea and vomiting.   Musculoskeletal:  Positive for gait problem. Negative for neck pain and neck stiffness.   Skin:  Negative for rash and wound.   Neurological:  Positive for dizziness. Negative for facial asymmetry, speech difficulty, weakness and headaches.   Psychiatric/Behavioral:  Negative for confusion. The patient is not nervous/anxious.    Scheduled Meds:   amLODIPine  5 mg Oral Daily    atenoloL  50 mg Oral Daily    atorvastatin  80 mg Oral Daily    rivaroxaban  20 mg Oral with dinner     Continuous Infusions:  PRN Meds:labetalol, sodium chloride 0.9%    Objective:     Vital Signs (Most Recent):  Temp: 98.4 °F (36.9 °C) (06/20/23 0728)  Pulse: 90 (06/20/23 1023)  Resp: 18 (06/20/23 1023)  BP: 114/71 (06/20/23 0728)  SpO2: 100 % (06/20/23 1023)  BP Location: Right arm    Vital Signs Range (Last 24H):  Temp:  [97.8 °F (36.6 °C)-98.4 °F (36.9 °C)]   Pulse:  []   Resp:  [16-18]   BP: ()/(66-91)   SpO2:  [95 %-100 %]   BP Location: Right arm       Physical Exam  Constitutional:       General: He is not in acute distress.  HENT:      Head: Normocephalic.   Eyes:      Extraocular Movements: Extraocular movements intact.   Cardiovascular:      Rate and Rhythm: Normal rate.   Pulmonary:      Effort: Pulmonary effort is normal.   Abdominal:       General: Abdomen is flat.   Musculoskeletal:         General: Normal range of motion.   Skin:     General: Skin is warm and dry.   Neurological:      Mental Status: He is alert and oriented to person, place, and time.      Cranial Nerves: No dysarthria or facial asymmetry.      Motor: No weakness.      Coordination: Finger-Nose-Finger Test normal.   Psychiatric:         Mood and Affect: Mood normal.            Neurological Exam:   LOC: alert  Attention Span: Good   Language: No aphasia  Articulation: No dysarthria  Orientation: Person, Place, Time   Visual Fields: Full  EOM (CN III, IV, VI): Full/intact  Pupils (CN II, III): PERRL  Facial Sensation (CN V): Normal  Facial Movement (CN VII): Symmetric facial expression    Motor: 4 extremities anti-gravity  Sensation: Intact to light touch throughout    Laboratory:  CMP:   Recent Labs   Lab 06/20/23  0602   CALCIUM 8.9   ALBUMIN 3.1*   PROT 6.5   *   K 3.9   CO2 22*      BUN 15   CREATININE 1.1   ALKPHOS 56   ALT 32   AST 42*   BILITOT 1.3*     BMP:   Recent Labs   Lab 06/20/23  0602   *   K 3.9      CO2 22*   BUN 15   CREATININE 1.1   CALCIUM 8.9     CBC:   Recent Labs   Lab 06/20/23  0602   WBC 5.33   RBC 5.14   HGB 14.2   HCT 40.7      MCV 79*   MCH 27.6   MCHC 34.9     Lipid Panel: No results for input(s): CHOL, LDLCALC, HDL, TRIG in the last 168 hours.  Coagulation: No results for input(s): PT, INR, APTT in the last 168 hours.  Platelet Aggregation Study: No results for input(s): PLTAGG, PLTAGINTERP, PLTAGREGLACO, ADPPLTAGGREG in the last 168 hours.  Hgb A1C:   Recent Labs   Lab 06/20/23  0819   HGBA1C 5.7*     TSH:   Recent Labs   Lab 06/14/23  1701   TSH 0.745       Diagnostic Results     Brain Imaging   CTH 6/20/2023     Moderate region of hypoattenuation and fullness in the left posterior cerebellum concerning for area of recent infarction.     No significant mass effect or hemorrhagic conversion     Continued small hyperdensity  left brachium pontis corresponds to known cavernous malformation seen on MRI.  Allowing for this there is no definite acute intracranial hemorrhage    MRI Brain 6/19/2023  Moderate size signal abnormality left posteroinferior cerebellum most compatible with acute/recent infarction correspond to abnormality seen on CT.  No significant mass effect or hemorrhagic conversion     Stable heterogeneous signal focus left brachium pontis most compatible with cavernous malformation without evidence for recent hemorrhage    MRA brain/neck 6/19/2023  MRA head: Absent visualization left posteroinferior cerebellar artery concerning for its occlusion.     Otherwise unremarkable MRA head as detailed above     MRA neck: Unremarkable noncontrast MRA neck without evidence for significant arterial stenosis throughout the neck as detailed above.    Cardiac Imaging   Echo 6/19/2023  The left ventricle is normal in size with concentric remodeling and normal systolic function.  The estimated ejection fraction is 60%.  Normal right ventricular size with normal right ventricular systolic function.  Mild mitral regurgitation.  Mild tricuspid regurgitation.  The estimated PA systolic pressure is 29 mmHg.  Normal central venous pressure (3 mmHg).  Trivial circumferential pericardial effusion.  Atrial fibrillation observed.

## 2023-06-21 ENCOUNTER — PATIENT OUTREACH (OUTPATIENT)
Dept: ADMINISTRATIVE | Facility: CLINIC | Age: 68
End: 2023-06-21
Payer: MEDICARE

## 2023-06-21 NOTE — PROGRESS NOTES
C3 nurse attempted to contact Reji Aleman Jr.  for a TCC post hospital discharge follow up call. No answer. The patient does not have a scheduled HOSFU appointment, and the pt does not have an [facility] PCP.

## 2023-06-21 NOTE — PROGRESS NOTES
[employer] OTC PRN for cramps    C3 nurse spoke with Reji Aleman Jr.  for a TCC post hospital discharge follow up call. The patient does not have a scheduled HOSFU appointment with [facility]  within 5-7 days post hospital discharge date 06/20/2023. C3 nurse was unable to schedule HOSFU appointment in [facility].    Please contact pcp and schedule follow up appointment using HOSFU visit type on or before 06/27/2023.

## 2023-06-22 LAB — LDLC SERPL-MCNC: 64 MG/DL

## 2023-06-26 ENCOUNTER — TELEPHONE (OUTPATIENT)
Dept: NEUROLOGY | Facility: HOSPITAL | Age: 68
End: 2023-06-26
Payer: MEDICARE

## 2023-06-26 NOTE — TELEPHONE ENCOUNTER
Attempted to reach patient regarding a stroke discharge followup call. Left message with callback info.

## 2023-06-27 ENCOUNTER — TELEPHONE (OUTPATIENT)
Dept: NEUROLOGY | Facility: HOSPITAL | Age: 68
End: 2023-06-27
Payer: MEDICARE

## 2023-07-11 DIAGNOSIS — L29.9 PRURITUS: ICD-10-CM

## 2023-07-14 RX ORDER — HYDROXYZINE HYDROCHLORIDE 25 MG/1
TABLET, FILM COATED ORAL
Qty: 90 TABLET | Refills: 1 | OUTPATIENT
Start: 2023-07-14

## 2023-07-21 ENCOUNTER — HOME CARE VISIT (OUTPATIENT)
Dept: NEUROLOGY | Facility: HOSPITAL | Age: 68
End: 2023-07-21
Payer: MEDICARE

## 2023-07-21 VITALS
BODY MASS INDEX: 27.87 KG/M2 | DIASTOLIC BLOOD PRESSURE: 70 MMHG | SYSTOLIC BLOOD PRESSURE: 118 MMHG | OXYGEN SATURATION: 100 % | HEART RATE: 76 BPM | WEIGHT: 223 LBS

## 2023-07-21 NOTE — PROGRESS NOTES
Mr. Aleman VS are WNL on today's visit. He has history of HTN, pAF, HLP, and CVA. Educated patient on stroke RF, stroke S/S, medication compliance, and purpose of SM program. Patient had loop recorder removed and was off Xarelto for that procedure. He does not require any subsequent visits from SM team.

## 2023-07-27 DIAGNOSIS — I48.0 PAROXYSMAL A-FIB: ICD-10-CM

## 2023-07-27 RX ORDER — AMLODIPINE BESYLATE 10 MG/1
5 TABLET ORAL DAILY
Qty: 45 TABLET | Refills: 3 | Status: SHIPPED | OUTPATIENT
Start: 2023-07-27

## 2023-07-27 RX ORDER — ROSUVASTATIN CALCIUM 40 MG/1
40 TABLET, COATED ORAL NIGHTLY
Qty: 90 TABLET | Refills: 3 | Status: SHIPPED | OUTPATIENT
Start: 2023-07-27

## 2023-08-01 RX ORDER — ATENOLOL 50 MG/1
50 TABLET ORAL DAILY
Qty: 90 TABLET | Refills: 3 | Status: SHIPPED | OUTPATIENT
Start: 2023-08-01

## 2023-11-22 ENCOUNTER — PATIENT MESSAGE (OUTPATIENT)
Dept: DERMATOLOGY | Facility: CLINIC | Age: 68
End: 2023-11-22
Payer: MEDICARE

## 2024-05-30 RX ORDER — ATENOLOL 50 MG/1
50 TABLET ORAL DAILY
Qty: 100 TABLET | Refills: 3 | Status: SHIPPED | OUTPATIENT
Start: 2024-05-30

## 2024-09-19 RX ORDER — ROSUVASTATIN CALCIUM 40 MG/1
40 TABLET, COATED ORAL NIGHTLY
Qty: 90 TABLET | Refills: 3 | Status: SHIPPED | OUTPATIENT
Start: 2024-09-19

## 2024-10-09 ENCOUNTER — TELEPHONE (OUTPATIENT)
Dept: CARDIOLOGY | Facility: CLINIC | Age: 69
End: 2024-10-09
Payer: MEDICARE

## 2024-10-09 ENCOUNTER — TELEPHONE (OUTPATIENT)
Dept: PHARMACY | Facility: CLINIC | Age: 69
End: 2024-10-09
Payer: MEDICARE

## 2024-10-09 ENCOUNTER — PATIENT MESSAGE (OUTPATIENT)
Dept: CARDIOLOGY | Facility: CLINIC | Age: 69
End: 2024-10-09
Payer: MEDICARE

## 2024-10-09 DIAGNOSIS — I48.0 PAROXYSMAL ATRIAL FIBRILLATION: Primary | ICD-10-CM

## 2024-10-09 NOTE — LETTER
October 9, 2024    Reji Aleman   [address]  [place] [address]         Dear Mr. Aleman,      My name is Anabella Nilton  I am reaching out on behalf of [facility]s Pharmacy Patient Assistance Team after receiving a referral from your Provider inquiring about assistance with your medication. I tried to contact you on 10/9/2024 . Sorry we missed you. Our goal is to assist qualified patients with financial assistance for their medications to better help enrollment for their medications to better help you achieve your health goals.      Please note that enrollment into available support will require the following documents:      Proof of household Income (such as social security statement, 1099 form, pension statement or 3 consecutive pay stubs)  Copy of all insurance cards (front and back)  Print out from your insurance or pharmacy showing how much you have spent on prescriptions this year  Completed Medication Access Center Authorization Forms       Please reach out to my phone number below if you are still in need of assistance with your medications. Follow-up attempt to reach you through MyChart or letter will be made in 5 business days. We look forward to hearing from you soon!    Thank you for choosing [facility] for your healthcare needs      Sincerely  Anabella ALCANTARA @[phone]  Pharmacy Patient Assistance Team  [address]  [address]  [place] [address]  Fax: [phone]  Email: [email or web address]

## 2024-10-09 NOTE — TELEPHONE ENCOUNTER
----- Message from Mayra sent at 10/8/2024 12:16 PM CDT -----  Regarding: Refill  Patient calling to get a refill on rivaroxaban (XARELTO) 20 mg Tab and asking to see if he can get some assistance because of the cost is high. Please call back @ [phone]. Thank you Mayra

## 2024-10-09 NOTE — TELEPHONE ENCOUNTER
Patient called and informed about the PAP program.  Patient agreed and the referral sent to the PAP program.

## 2024-10-09 NOTE — TELEPHONE ENCOUNTER
We have reached out to Mr. Aleman to inform him of the [facility] Care Path application process for Xarelto  and whats required to apply.  He did not answer. I left a voicemail,mailed a letter and sent a portal message.Follow-up will be made in 5 business days.    Anabella Callaway  Pharmacy Patient Assistance Team

## 2024-10-17 DIAGNOSIS — I48.0 PAROXYSMAL A-FIB: ICD-10-CM

## 2024-10-18 NOTE — TELEPHONE ENCOUNTER
----- Message from Yadiel sent at 10/17/2024 11:57 AM CDT -----  [facility] is calling regarding a refill on rivaroxaban (XARELTO) 20 mg Tab.     [facility]   Phone: [phone]  Fax: [phone]    Thank you

## 2024-10-18 NOTE — TELEPHONE ENCOUNTER
Xarelto refill sent to Dr Gardner for signature.    Yadiel Ham [facility] Staff  Caller: Unspecified (Today, 11:57 AM)  [facility] is calling regarding a refill on rivaroxaban (XARELTO) 20 mg Tab.    [facility]  Phone: [phone]  Fax: [phone]    Thank you

## 2024-10-21 ENCOUNTER — TELEPHONE (OUTPATIENT)
Dept: CARDIOLOGY | Facility: CLINIC | Age: 69
End: 2024-10-21
Payer: MEDICARE

## 2024-10-21 NOTE — TELEPHONE ENCOUNTER
Patient called and scheduled for his yearly f/u and preop for dental work/medication instructions.

## 2024-10-23 PROBLEM — Z01.810 PREOPERATIVE CARDIOVASCULAR EXAMINATION: Status: ACTIVE | Noted: 2024-10-23

## 2024-10-23 NOTE — PROGRESS NOTES
General Cardiology Clinic Note  Reason for Visit: Pre-op  Last Clinic Visit: 03/10/23   General Cardiologist: Dr. Gardner    HPI:     Reji Aleman Jr. is a 69 y.o. , who presents for pre-operative evaluation and follow-up     PROBLEM LIST:  Hypertension   CVA (8/14/17)  Atrial fibrillation     Interval HPI:   Reji Aleman Jr. presents today for cardiac risk stratification in anticipation of dental procedure not scheduled yet pending today's visit. He reports doing well overall from a cardiac standpoint since his last clinic visit. His only complaint today is that of R jaw pain from his tooth. He is sedentary for the most part but stays active with his house chores and cutting his grass. He denies any cardiac restrictions or limitations with activity. Patient checks his blood pressure at home regularly, BP ranges in the 120-130's systolic, BP today in clinic today 130/92. There is pitting edema present to BLE, patient states swelling is better with elevation of legs. Patient does not adhere to sodium restriction and uses a lot of seasoning with his meals. He denies chest pain/pressure/tightness/discomfort, dyspnea on exertion, orthopnea, PND, palpitations, syncope or claudication.    ROS:    Pertinent ROS included in HPI and below.  PMH:     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/04/2017    Hypertension      Past Surgical History:   Procedure Laterality Date    RADIOFREQUENCY ABLATION  04/06/2018    Cryo PVI    REMOVAL OF IMPLANTABLE LOOP RECORDER N/A 6/12/2023    Procedure: REMOVAL, IMPLANTABLE LOOP RECORDER;  Surgeon: Jose Angel Montes MD;  Location: [facility];  Service: Cardiology;  Laterality: N/A;  RAGINI, ILR Removal, LINDSEY ILAMRITA, DANIAL devi, 3 Prep     Allergies:     Review of patient's allergies indicates:   Allergen Reactions    Sulfa (sulfonamide antibiotics) Hives     Medications:     Current Outpatient Medications on  "File Prior to Visit   Medication Sig Dispense Refill    amLODIPine (NORVASC) 10 MG tablet Take 0.5 tablets (5 mg total) by mouth once daily. 45 tablet 3    atenoloL (TENORMIN) 50 MG tablet Take 1 tablet (50 mg total) by mouth once daily. 100 tablet 3    hydrOXYzine HCL (ATARAX) 25 MG tablet TAKE 1 TABLET NIGHTLY AS NEEDED FOR ITCHING 90 tablet 1    ondansetron (ZOFRAN-ODT) 4 MG TbDL Take 1 tablet (4 mg total) by mouth every 6 (six) hours as needed (nausea). 10 tablet 0    rivaroxaban (XARELTO) 20 mg Tab TAKE 1 TABLET DAILY WITH DINNER OR EVENING MEAL 90 tablet 3    rosuvastatin (CRESTOR) 40 MG Tab Take 1 tablet (40 mg total) by mouth every evening. 90 tablet 3    triamcinolone acetonide 0.1% (KENALOG) 0.1 % ointment APPLY TO AFFECTED AREA TWICE A DAY FOR 1-2 WKS THEN AS NEEDED FLARES ONLY  0     No current facility-administered medications on file prior to visit.     Social History:     Social History     Tobacco Use    Smoking status: Never    Smokeless tobacco: Never   Substance Use Topics    Alcohol use: Yes     Comment: socially     Family History:     Family History   Problem Relation Name Age of Onset    Melanoma Neg Hx       Physical Exam:   BP (!) 130/92 (Patient Position: Sitting)   Pulse 72   Ht 6' 3" (1.905 m)   Wt 102.4 kg (225 lb 12 oz)   SpO2 100%   BMI 28.22 kg/m²      Physical Exam  Constitutional:       General: He is not in acute distress.     Appearance: Normal appearance. He is not ill-appearing.   HENT:      Head: Normocephalic and atraumatic.      Nose: Nose normal. No congestion or rhinorrhea.      Mouth/Throat:      Mouth: Mucous membranes are moist.      Pharynx: Oropharynx is clear. No oropharyngeal exudate or posterior oropharyngeal erythema.   Eyes:      General:         Right eye: No discharge.         Left eye: No discharge.      Extraocular Movements: Extraocular movements intact.      Conjunctiva/sclera: Conjunctivae normal.   Neck:      Vascular: No carotid bruit.   Pulmonary: "      Effort: Pulmonary effort is normal. No respiratory distress.      Breath sounds: Normal breath sounds. No stridor. No wheezing or rales.   Musculoskeletal:         General: Swelling present. Normal range of motion.      Cervical back: Normal range of motion. No rigidity or tenderness.      Right lower leg: Edema present.      Left lower leg: Edema present.      Comments: Bilateral 2+ pitting edema up to knees   Skin:     General: Skin is warm and dry.      Coloration: Skin is not jaundiced.      Findings: No bruising or lesion.   Neurological:      General: No focal deficit present.      Mental Status: He is alert and oriented to person, place, and time. Mental status is at baseline.   Psychiatric:         Mood and Affect: Mood normal.         Behavior: Behavior normal.         Thought Content: Thought content normal.         Judgment: Judgment normal.        Labs:     Blood Tests:  Lab Results   Component Value Date    BNP 81 06/19/2023     (L) 06/20/2023    K 3.9 06/20/2023     06/20/2023    CO2 22 (L) 06/20/2023    BUN 15 06/20/2023    CREATININE 1.1 06/20/2023    GLU 87 06/20/2023    HGBA1C 5.7 (H) 06/20/2023    MG 2.0 06/20/2023    AST 42 (H) 06/20/2023    ALT 32 06/20/2023    ALBUMIN 3.1 (L) 06/20/2023    PROT 6.5 06/20/2023    BILITOT 1.3 (H) 06/20/2023    WBC 5.33 06/20/2023    HGB 14.2 06/20/2023    HCT 40.7 06/20/2023    MCV 79 (L) 06/20/2023     06/20/2023    INR 1.1 04/03/2018    TSH 0.745 06/14/2023       Lab Results   Component Value Date    CHOL 124 03/10/2023    HDL 53 03/10/2023    TRIG 54 03/10/2023       Lab Results   Component Value Date    LDLCALC 60.2 (L) 03/10/2023       Lab Results   Component Value Date    TSH 0.745 06/14/2023       Lab Results   Component Value Date    HGBA1C 5.7 (H) 06/20/2023     Imaging:     Echocardiogram  TTE 06/20/23  The left ventricle is normal in size with concentric remodeling and normal systolic function.  The estimated ejection fraction  is 60%.  Normal right ventricular size with normal right ventricular systolic function.  Mild mitral regurgitation.  Mild tricuspid regurgitation.  The estimated PA systolic pressure is 29 mmHg.  Normal central venous pressure (3 mmHg).  Trivial circumferential pericardial effusion.  Atrial fibrillation observed.    AYELEN 04/06/18  CONCLUSIONS     1 - No visualized thrombus in the left atrium.     2 - No visualized thrombus in the left atrial appendage.     3 - Mildly depressed left ventricular systolic function.     4 - Mild mitral regurgitation.     5 - Trivial tricuspid regurgitation.     TTE 10/09/17  CONCLUSIONS     1 - Normal left ventricular systolic function (EF 60-65%).     2 - Low normal right ventricular systolic function .     3 - Indeterminate LV diastolic function.     4 - Biatrial enlargement.     5 - The estimated PA systolic pressure is greater than 36 mmHg.     6 - No evidence of intracardiac shunt.     7 - Rhythm was irregularly irregular througout the study.     TTE 08/05/17  TEST DESCRIPTION    Aorta: The aortic root is normal in size, measuring 2.8 cm at sinotubular junction and 3.5 cm at Sinuses of Valsalva. The proximal ascending aorta is normal in size, measuring 3.2 cm across.    Left Atrium: The left atrial volume index is mildly enlarged, measuring 37.30 cc/m2.    Left Ventricle: The left ventricle is normal in size, with an end-diastolic diameter of 4.9 cm, and an end-systolic diameter of 3.4 cm. LV wall thickness is normal, with the septum measuring 1.0 cm and the posterior wall measuring 0.8 cm across. Relative   wall thickness was normal at 0.33, and the LV mass index was 77.6 g/m2 consistent with normal left ventricular mass. There are no regional wall motion abnormalities. Left ventricular systolic function appears normal. Visually estimated ejection fraction   is 60-65%. The LV Doppler derived stroke volume equals 64.0 ccs.    Diastolic indices: E wave velocity 0.7 m/s, E/A ratio  1.7,  msec., E/e' ratio(avg) 8. There is pseudonormalization of mitral inflow pattern consistent with significant diastolic dysfunction.    Right Atrium: The right atrium is normal in size, measuring 5.8 cm in length and 5.0 cm in width in the apical view.    Right Ventricle: The right ventricle is normal in size measuring 3.9 cm at the base in the apical right ventricle-focused view. Global right ventricular systolic function appears normal. Tricuspid annular plane systolic excursion (TAPSE) is 1.3 cm.  Tissue Doppler-derived tricuspid annular peak systolic velocity (S prime) is 11.2 cm/s. The estimated PA systolic pressure is 36 mmHg.    Aortic Valve:  The aortic valve is normal in structure with normal leaflet mobility.    Mitral Valve:  The mitral valve is normal in structure with normal leaflet mobility. There is mild mitral regurgitation.    Tricuspid Valve:  The tricuspid valve is normal in structure with normal leaflet mobility.    Pulmonary Valve:  The pulmonic valve is normal in structure with normal leaflet mobility.    IVC: IVC is normal in size and collapses > 50% with a sniff, suggesting normal right atrial pressure of 3 mmHg.    Intracavitary: There is no evidence of pericardial effusion, intracavity mass, thrombi, or vegetation.    CONCLUSIONS    1 - Mild left atrial enlargement.    2 - Normal left ventricular systolic function (EF 60-65%).    3 - Impaired LV relaxation, elevated LAP (grade 2 diastolic dysfunction).    4 - Normal right ventricular systolic function .    5 - Mild mitral regurgitation.    6 - The estimated PA systolic pressure is 36 mmHg.     Stress testing  None    Cath Lab  None    Other  Cardiac event monitor 17  SUMMARY:  In summary, this event monitor is consistent with paroxysmal atrial fibrillation without rapid ventricular conduction.  Clinical correlation is advised.     EK23  Atrial fibrillation at 84 bpm  Nonspecific T wave abnormality   Abnormal ECG    When compared with ECG of 14-JUN-2023 15:07,   No significant change was found     Assessment:     1. Preoperative cardiovascular examination    2. Hypertension, unspecified type    3. Mixed hyperlipidemia    4. Persistent atrial fibrillation    5. Fibromuscular dysplasia    6. Leg swelling      Plan:     Preoperative cardiovascular examination  RCRI score of 1 is consistent with 6.0 % toni-operative risk of major adverse cardiac event (cardiac death, nonfatal MI, nonfatal cardiac arrest). Pt has no active cardiac condition (ACS/USA, decompenstated CHF, significant arrhythmias or severe valvular disease) and can easily achieve 4 METS.  As such, pt does not require further cardiac evaluation prior to undergoing surgery.  Pt should remain on beta-blockers throughout the entire toni-procedure time period.  Xarelto therapy can be held 1-2 days prior but should be restarted as soon as safely possible.  The remaining cardiac meds can be held as needed but should also be restarted after the procedure. These recommendations follow the most current Guideline on Perioperative Cardiovascular Evaluation and Management of Patients Undergoing Noncardiac Surgery released by the ACC/AHA (JACC 2014.07.944).    Hypertension, unspecified type  /92, at goal <130/80  Continue Amlodipine 10 mg qd     Mixed hyperlipidemia  LDL 64, at goal   Continue rosuvastatin 40 mg qd     Persistent atrial fibrillation  Followed by EP with Dr. Montes   Continue Xarelto 20 mg qd     Leg swelling  BLE present up to patient knees  Recommend compression socks, leg elevation and sodium restriction   BNP today     Follow up in one year     Signed:  Sharon Lagos, PA-C [facility]     10/25/2024 2:21 PM    Follow-up:     No future appointments.

## 2024-10-25 ENCOUNTER — OFFICE VISIT (OUTPATIENT)
Dept: CARDIOLOGY | Facility: CLINIC | Age: 69
End: 2024-10-25
Payer: MEDICARE

## 2024-10-25 ENCOUNTER — TELEPHONE (OUTPATIENT)
Dept: CARDIOLOGY | Facility: CLINIC | Age: 69
End: 2024-10-25

## 2024-10-25 ENCOUNTER — HOSPITAL ENCOUNTER (OUTPATIENT)
Dept: CARDIOLOGY | Facility: HOSPITAL | Age: 69
Discharge: HOME | End: 2024-10-25
Payer: MEDICARE

## 2024-10-25 VITALS
DIASTOLIC BLOOD PRESSURE: 87 MMHG | HEIGHT: 75 IN | WEIGHT: 225 LBS | HEART RATE: 67 BPM | SYSTOLIC BLOOD PRESSURE: 130 MMHG | BODY MASS INDEX: 27.98 KG/M2

## 2024-10-25 VITALS
OXYGEN SATURATION: 100 % | DIASTOLIC BLOOD PRESSURE: 92 MMHG | BODY MASS INDEX: 28.07 KG/M2 | SYSTOLIC BLOOD PRESSURE: 130 MMHG | WEIGHT: 225.75 LBS | HEIGHT: 75 IN | HEART RATE: 72 BPM

## 2024-10-25 DIAGNOSIS — I48.19 PERSISTENT ATRIAL FIBRILLATION: ICD-10-CM

## 2024-10-25 DIAGNOSIS — I77.3 FIBROMUSCULAR DYSPLASIA: ICD-10-CM

## 2024-10-25 DIAGNOSIS — M79.89 LEG SWELLING: ICD-10-CM

## 2024-10-25 DIAGNOSIS — R79.89 ELEVATED BRAIN NATRIURETIC PEPTIDE (BNP) LEVEL: ICD-10-CM

## 2024-10-25 DIAGNOSIS — R79.89 ELEVATED BRAIN NATRIURETIC PEPTIDE (BNP) LEVEL: Primary | ICD-10-CM

## 2024-10-25 DIAGNOSIS — Z01.810 PREOPERATIVE CARDIOVASCULAR EXAMINATION: Primary | ICD-10-CM

## 2024-10-25 DIAGNOSIS — I10 HYPERTENSION, UNSPECIFIED TYPE: ICD-10-CM

## 2024-10-25 DIAGNOSIS — E78.2 MIXED HYPERLIPIDEMIA: ICD-10-CM

## 2024-10-25 LAB
ASCENDING AORTA: 3.45 CM
AV AREA BY CONTINUOUS VTI: 3.2 CM2
AV INDEX (PROSTH): 0.87
AV LVOT MEAN GRADIENT: 1 MMHG
AV LVOT PEAK GRADIENT: 2 MMHG
AV MEAN GRADIENT: 2.2 MMHG
AV PEAK GRADIENT: 4 MMHG
AV VALVE AREA BY VELOCITY RATIO: 2.7 CM²
AV VALVE AREA: 3.3 CM2
AV VELOCITY RATIO: 0.7
BSA FOR ECHO PROCEDURE: 2.32 M2
CV ECHO LV RWT: 0.52 CM
DOP CALC AO PEAK VEL: 1 M/S
DOP CALC AO VTI: 14.4 CM
DOP CALC LVOT AREA: 3.8 CM2
DOP CALC LVOT DIAMETER: 2.2 CM
DOP CALC LVOT PEAK VEL: 0.7 M/S
DOP CALC LVOT STROKE VOLUME: 47.5 CM3
DOP CALCLVOT PEAK VEL VTI: 12.5 CM
E/E' RATIO: 7.9 M/S
ECHO EF ESTIMATED: 58 %
ECHO LV POSTERIOR WALL: 1.2 CM (ref 0.6–1.1)
FRACTIONAL SHORTENING: 30.4 % (ref 28–44)
INTERVENTRICULAR SEPTUM: 1.3 CM (ref 0.6–1.1)
LA MAJOR: 5.16 CM
LA MINOR: 4.84 CM
LA WIDTH: 4.12 CM
LEFT ATRIUM SIZE: 3.87 CM
LEFT ATRIUM VOLUME INDEX MOD: 20.6 ML/M2
LEFT ATRIUM VOLUME INDEX: 29.3 ML/M2
LEFT ATRIUM VOLUME MOD: 47.59 ML
LEFT ATRIUM VOLUME: 67.69 CM3
LEFT INTERNAL DIMENSION IN SYSTOLE: 3.2 CM (ref 2.1–4)
LEFT VENTRICLE DIASTOLIC VOLUME INDEX: 42.22 ML/M2
LEFT VENTRICLE DIASTOLIC VOLUME: 97.52 ML
LEFT VENTRICLE MASS INDEX: 94.1 G/M2
LEFT VENTRICLE SYSTOLIC VOLUME INDEX: 17.6 ML/M2
LEFT VENTRICLE SYSTOLIC VOLUME: 40.57 ML
LEFT VENTRICULAR INTERNAL DIMENSION IN DIASTOLE: 4.6 CM (ref 3.5–6)
LEFT VENTRICULAR MASS: 217.4 G
LV LATERAL E/E' RATIO: 7.55 M/S
LV SEPTAL E/E' RATIO: 8.3 M/S
MV PEAK E VEL: 0.83 M/S
OHS CV RV/LV RATIO: 1.15 CM
PISA TR MAX VEL: 2.78 M/S
RA MAJOR: 5.71 CM
RA PRESSURE ESTIMATED: 3 MMHG
RA WIDTH: 4.57 CM
RIGHT ATRIAL AREA: 23.1 CM2
RIGHT VENTRICLE DIASTOLIC BASEL DIMENSION: 5.3 CM
RV TB RVSP: 6 MMHG
SINUS: 3.5 CM
STJ: 3.4 CM
TDI LATERAL: 0.11 M/S
TDI SEPTAL: 0.1 M/S
TDI: 0.11 M/S
TR MAX PG: 31 MMHG
TRICUSPID ANNULAR PLANE SYSTOLIC EXCURSION: 1.57 CM
TV PEAK GRADIENT: 31 MMHG
TV REST PULMONARY ARTERY PRESSURE: 34 MMHG
Z-SCORE OF LEFT VENTRICULAR DIMENSION IN END DIASTOLE: -6.8
Z-SCORE OF LEFT VENTRICULAR DIMENSION IN END SYSTOLE: -4.25

## 2024-10-25 PROCEDURE — 93306 TTE W/DOPPLER COMPLETE: CPT | Mod: 26,,, | Performed by: STUDENT IN AN ORGANIZED HEALTH CARE EDUCATION/TRAINING PROGRAM

## 2024-10-25 PROCEDURE — 99999 PR PBB SHADOW E&M-EST. PATIENT-LVL III: CPT | Mod: PBBFAC,,,

## 2024-10-25 PROCEDURE — 93306 TTE W/DOPPLER COMPLETE: CPT

## 2024-10-25 NOTE — TELEPHONE ENCOUNTER
Spoke with pt, appt have been scheduled. Please see appt desk.   Future Appointments   Date Time Provider Department Center   10/25/2024  3:15 PM ECHO, [facility] ECHOSTR Jose Carlos Mejai               ----- Message from Shannan Thurston PA-C sent at 10/25/2024 11:18 AM CDT -----  Can you please reach out to the patient to get him scheduled for soonest available echo? I already talked to him and let him know his BNP was slightly elevated and is expecting your call. Soonest available please.     Thank you,  Shannan

## 2024-10-28 ENCOUNTER — TELEPHONE (OUTPATIENT)
Dept: CARDIOLOGY | Facility: CLINIC | Age: 69
End: 2024-10-28
Payer: MEDICARE

## 2024-10-28 DIAGNOSIS — R79.89 ELEVATED BRAIN NATRIURETIC PEPTIDE (BNP) LEVEL: Primary | ICD-10-CM

## 2024-10-28 DIAGNOSIS — R60.0 BILATERAL LOWER EXTREMITY EDEMA: ICD-10-CM

## 2024-10-28 RX ORDER — FUROSEMIDE 20 MG/1
10 TABLET ORAL
Qty: 15 TABLET | Refills: 11 | Status: SHIPPED | OUTPATIENT
Start: 2024-10-28 | End: 2025-10-28

## 2024-11-04 ENCOUNTER — LAB VISIT (OUTPATIENT)
Dept: LAB | Facility: HOSPITAL | Age: 69
End: 2024-11-04
Payer: MEDICARE

## 2024-11-04 DIAGNOSIS — R79.89 ELEVATED BRAIN NATRIURETIC PEPTIDE (BNP) LEVEL: ICD-10-CM

## 2024-11-04 DIAGNOSIS — R60.0 BILATERAL LOWER EXTREMITY EDEMA: ICD-10-CM

## 2024-11-04 LAB
ALBUMIN SERPL BCP-MCNC: 4 G/DL (ref 3.5–5.2)
ALP SERPL-CCNC: 70 U/L (ref 40–150)
ALT SERPL W/O P-5'-P-CCNC: 21 U/L (ref 10–44)
ANION GAP SERPL CALC-SCNC: 8 MMOL/L (ref 8–16)
AST SERPL-CCNC: 34 U/L (ref 10–40)
BILIRUB SERPL-MCNC: 0.8 MG/DL (ref 0.1–1)
BUN SERPL-MCNC: 14 MG/DL (ref 8–23)
CALCIUM SERPL-MCNC: 9.5 MG/DL (ref 8.7–10.5)
CHLORIDE SERPL-SCNC: 105 MMOL/L (ref 95–110)
CO2 SERPL-SCNC: 25 MMOL/L (ref 23–29)
CREAT SERPL-MCNC: 1.3 MG/DL (ref 0.5–1.4)
EST. GFR  (NO RACE VARIABLE): 58.7 ML/MIN/1.73 M^2
GLUCOSE SERPL-MCNC: 104 MG/DL (ref 70–110)
POTASSIUM SERPL-SCNC: 4.3 MMOL/L (ref 3.5–5.1)
PROT SERPL-MCNC: 7.1 G/DL (ref 6–8.4)
SODIUM SERPL-SCNC: 138 MMOL/L (ref 136–145)

## 2024-11-04 PROCEDURE — 36415 COLL VENOUS BLD VENIPUNCTURE: CPT

## 2024-11-04 PROCEDURE — 80053 COMPREHEN METABOLIC PANEL: CPT

## 2025-01-06 DIAGNOSIS — I48.0 PAROXYSMAL A-FIB: ICD-10-CM

## 2025-01-08 RX ORDER — RIVAROXABAN 20 MG/1
20 TABLET, FILM COATED ORAL
Qty: 90 TABLET | Refills: 3 | Status: SHIPPED | OUTPATIENT
Start: 2025-01-08

## 2025-02-06 NOTE — ASSESSMENT & PLAN NOTE
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     Symptoms correlate as patient is a left handed individual with right hemispheric ischemic stroke and episode of expressive aphasia     Increase ASA to 325 daily  Atorvastatin 40 daily  - LDL > 70   CTA head and neck   Echo   Hep vte  Bp< 220/110  - permissive HTN  PT/OT/ speech - likely no needs as he is back to baseline   Stroke education   No

## 2025-05-08 RX ORDER — AMLODIPINE BESYLATE 10 MG/1
5 TABLET ORAL DAILY
Qty: 45 TABLET | Refills: 3 | Status: SHIPPED | OUTPATIENT
Start: 2025-05-08

## 2025-06-04 NOTE — TELEPHONE ENCOUNTER
Refill Routing Note   Medication(s) are not appropriate for processing by [facility] for the following reason(s):        Patient not seen by provider within 15 months    [facility] action(s):  Defer               Appointments  past 12m or future 3m with PCP    Date Provider   Last Visit   3/10/2023 Sahra Gardner MD   Next Visit   Visit date not found Sahra Gardner MD   ED visits in past 90 days: 0        Note composed:10:09 AM 06/04/2025

## 2025-06-11 RX ORDER — ATENOLOL 50 MG/1
50 TABLET ORAL
Qty: 100 TABLET | Refills: 2 | Status: SHIPPED | OUTPATIENT
Start: 2025-06-11

## 2025-09-03 RX ORDER — ROSUVASTATIN CALCIUM 40 MG/1
40 TABLET, COATED ORAL NIGHTLY
Qty: 90 TABLET | Refills: 0 | Status: SHIPPED | OUTPATIENT
Start: 2025-09-03

## (undated) DEVICE — PACK PACER PERMANENT OMC

## (undated) DEVICE — ELECTRODE REM PLYHSV RETURN 9

## (undated) DEVICE — DRAPE PED LAP SURG 108X77IN

## (undated) DEVICE — ADHESIVE DERMABOND ADVANCED